# Patient Record
Sex: FEMALE | Race: WHITE | NOT HISPANIC OR LATINO | Employment: FULL TIME | URBAN - METROPOLITAN AREA
[De-identification: names, ages, dates, MRNs, and addresses within clinical notes are randomized per-mention and may not be internally consistent; named-entity substitution may affect disease eponyms.]

---

## 2017-07-21 ENCOUNTER — OFFICE VISIT (OUTPATIENT)
Dept: OBGYN | Facility: CLINIC | Age: 32
End: 2017-07-21
Payer: COMMERCIAL

## 2017-07-21 ENCOUNTER — RESULT FOLLOW UP (OUTPATIENT)
Dept: OBGYN | Facility: CLINIC | Age: 32
End: 2017-07-21

## 2017-07-21 VITALS
BODY MASS INDEX: 25.61 KG/M2 | HEIGHT: 68 IN | SYSTOLIC BLOOD PRESSURE: 102 MMHG | WEIGHT: 169 LBS | DIASTOLIC BLOOD PRESSURE: 68 MMHG

## 2017-07-21 DIAGNOSIS — Z11.8 SCREENING FOR CHLAMYDIAL DISEASE: ICD-10-CM

## 2017-07-21 DIAGNOSIS — R53.83 FATIGUE, UNSPECIFIED TYPE: ICD-10-CM

## 2017-07-21 DIAGNOSIS — Z11.3 SCREEN FOR STD (SEXUALLY TRANSMITTED DISEASE): ICD-10-CM

## 2017-07-21 DIAGNOSIS — Z11.4 SCREENING FOR HUMAN IMMUNODEFICIENCY VIRUS: ICD-10-CM

## 2017-07-21 DIAGNOSIS — Z11.51 SCREENING FOR HUMAN PAPILLOMAVIRUS: ICD-10-CM

## 2017-07-21 DIAGNOSIS — Z01.419 ENCOUNTER FOR GYNECOLOGICAL EXAMINATION WITHOUT ABNORMAL FINDING: Primary | ICD-10-CM

## 2017-07-21 DIAGNOSIS — B97.7 HIGH RISK HPV INFECTION: ICD-10-CM

## 2017-07-21 LAB
HGB BLD-MCNC: 12.8 G/DL (ref 11.7–15.7)
VIT B12 SERPL-MCNC: 332 PG/ML (ref 193–986)

## 2017-07-21 PROCEDURE — 84443 ASSAY THYROID STIM HORMONE: CPT | Performed by: NURSE PRACTITIONER

## 2017-07-21 PROCEDURE — 87491 CHLMYD TRACH DNA AMP PROBE: CPT | Performed by: NURSE PRACTITIONER

## 2017-07-21 PROCEDURE — G0476 HPV COMBO ASSAY CA SCREEN: HCPCS | Performed by: NURSE PRACTITIONER

## 2017-07-21 PROCEDURE — 82607 VITAMIN B-12: CPT | Performed by: NURSE PRACTITIONER

## 2017-07-21 PROCEDURE — G0145 SCR C/V CYTO,THINLAYER,RESCR: HCPCS | Performed by: NURSE PRACTITIONER

## 2017-07-21 PROCEDURE — 82306 VITAMIN D 25 HYDROXY: CPT | Performed by: NURSE PRACTITIONER

## 2017-07-21 PROCEDURE — 86696 HERPES SIMPLEX TYPE 2 TEST: CPT | Performed by: NURSE PRACTITIONER

## 2017-07-21 PROCEDURE — 36415 COLL VENOUS BLD VENIPUNCTURE: CPT | Performed by: NURSE PRACTITIONER

## 2017-07-21 PROCEDURE — 87389 HIV-1 AG W/HIV-1&-2 AB AG IA: CPT | Performed by: NURSE PRACTITIONER

## 2017-07-21 PROCEDURE — 82728 ASSAY OF FERRITIN: CPT | Performed by: NURSE PRACTITIONER

## 2017-07-21 PROCEDURE — 86780 TREPONEMA PALLIDUM: CPT | Performed by: NURSE PRACTITIONER

## 2017-07-21 PROCEDURE — 87591 N.GONORRHOEAE DNA AMP PROB: CPT | Performed by: NURSE PRACTITIONER

## 2017-07-21 PROCEDURE — 99395 PREV VISIT EST AGE 18-39: CPT | Performed by: NURSE PRACTITIONER

## 2017-07-21 PROCEDURE — 86695 HERPES SIMPLEX TYPE 1 TEST: CPT | Performed by: NURSE PRACTITIONER

## 2017-07-21 PROCEDURE — 85018 HEMOGLOBIN: CPT | Performed by: NURSE PRACTITIONER

## 2017-07-21 PROCEDURE — G0499 HEPB SCREEN HIGH RISK INDIV: HCPCS | Performed by: NURSE PRACTITIONER

## 2017-07-21 RX ORDER — LANOLIN ALCOHOL/MO/W.PET/CERES
3 CREAM (GRAM) TOPICAL
COMMUNITY
End: 2018-12-05

## 2017-07-21 RX ORDER — HYDROXYZINE HYDROCHLORIDE 10 MG/1
TABLET, FILM COATED ORAL
Refills: 0 | COMMUNITY
Start: 2017-05-25 | End: 2018-10-26

## 2017-07-21 RX ORDER — UREA 10 %
220 LOTION (ML) TOPICAL
COMMUNITY
End: 2018-12-05

## 2017-07-21 RX ORDER — SERTRALINE HYDROCHLORIDE 25 MG/1
TABLET, FILM COATED ORAL
Refills: 11 | COMMUNITY
Start: 2017-06-23 | End: 2017-08-17

## 2017-07-21 ASSESSMENT — ANXIETY QUESTIONNAIRES
3. WORRYING TOO MUCH ABOUT DIFFERENT THINGS: NOT AT ALL
5. BEING SO RESTLESS THAT IT IS HARD TO SIT STILL: NOT AT ALL
GAD7 TOTAL SCORE: 0
7. FEELING AFRAID AS IF SOMETHING AWFUL MIGHT HAPPEN: NOT AT ALL
6. BECOMING EASILY ANNOYED OR IRRITABLE: NOT AT ALL
IF YOU CHECKED OFF ANY PROBLEMS ON THIS QUESTIONNAIRE, HOW DIFFICULT HAVE THESE PROBLEMS MADE IT FOR YOU TO DO YOUR WORK, TAKE CARE OF THINGS AT HOME, OR GET ALONG WITH OTHER PEOPLE: NOT DIFFICULT AT ALL
1. FEELING NERVOUS, ANXIOUS, OR ON EDGE: NOT AT ALL
2. NOT BEING ABLE TO STOP OR CONTROL WORRYING: NOT AT ALL

## 2017-07-21 ASSESSMENT — PATIENT HEALTH QUESTIONNAIRE - PHQ9: 5. POOR APPETITE OR OVEREATING: NOT AT ALL

## 2017-07-21 NOTE — LETTER
August 3, 2018      Jose L Vale  [address]  [facility] [address]    Dear MsIsmaelVale,      At [facility], your health and wellness is our primary concern. That is why we are following up on a colposcopy and abnormal Pap smear from 08/17/17, which was reported as ASCUS and positive for high risk HPV 18 and other. Your provider had recommended that you have a Pap smear and HPV test completed by 08/17/18. Our records do not show that this has been scheduled.    It is important to complete the follow up that your provider has suggested for you to ensure that there are no worsening changes which may, over time, develop into cancer.      Please contact our office at  [phone] to schedule an appointment for a Pap smear and HPV test at your earliest convenience. If you have questions or concerns, please call the clinic and we will be happy to assist you.    If you have completed the tests outside of [facility], please have the results forwarded to our office. We will update the chart for your primary Physician to review before your next annual physical.     Thank you for choosing [facility]!    Sincerely,      Jamila Villalba, YAMILE CNP/esh

## 2017-07-21 NOTE — MR AVS SNAPSHOT
After Visit Summary   7/21/2017    Jose L Collazo    MRN: [id number]           Patient Information     Date Of Birth          1985        Visit Information        Provider Department      7/21/2017 10:30 AM Jamila Villalba APRN CNP [facility] [place]        Today's Diagnoses     Encounter for gynecological examination without abnormal finding    -  1    Screen for STD (sexually transmitted disease)        Screening for chlamydial disease        Screening for human immunodeficiency virus        Fatigue, unspecified type           Follow-ups after your visit        Your next 10 appointments already scheduled     Aug 03, 2017  2:30 PM CDT   Office Visit with Matt Ray MD   [facility] [place] ([facility])    [address] [place] [id number] [phone]           Bring a current list of meds and any records pertaining to this visit.  For Physicals, please bring immunization records and any forms needing to be filled out.  Please arrive 10 minutes early to complete paperwork.            Aug 09, 2017  8:30 AM CDT   Office Visit with Matt Ray MD   [facility] ([facility])    [address] [place] [id number] [phone]           Bring a current list of meds and any records pertaining to this visit.  For Physicals, please bring immunization records and any forms needing to be filled out.  Please arrive 10 minutes early to complete paperwork.              Who to contact     If you have questions or need follow up information about today's clinic visit or your schedule please contact [facility] [place] directly at [phone].  Normal or non-critical lab and imaging results will be communicated to you by MyChart, letter or phone within 4 business days after the clinic has received the results. If you do not hear from us within 7  "days, please contact the clinic through [employer] or phone. If you have a critical or abnormal lab result, we will notify you by phone as soon as possible.  Submit refill requests through [employer] or call your pharmacy and they will forward the refill request to us. Please allow 3 business days for your refill to be completed.          Additional Information About Your Visit        [employer] Information     [employer] lets you send messages to your doctor, view your test results, renew your prescriptions, schedule appointments and more. To sign up, go to [email or web address] . Click on \"Log in\" on the left side of the screen, which will take you to the Welcome page. Then click on \"Sign up Now\" on the right side of the page.     You will be asked to enter the access code listed below, as well as some personal information. Please follow the directions to create your username and password.     Your access code is: [email or web address]  Expires: 10/19/2017 11:21 AM     Your access code will  in 90 days. If you need help or a new code, please call your [place] clinic or [phone].        Care EveryWhere ID     This is your Care EveryWhere ID. This could be used by other organizations to access your [facility] medical records  [id number]        Your Vitals Were     Height BMI (Body Mass Index)                5' 8\" (1.727 m) 25.7 kg/m2           Blood Pressure from Last 3 Encounters:   17 102/68   16 110/80   06/03/15 124/72    Weight from Last 3 Encounters:   17 169 lb (76.7 kg)   16 180 lb (81.6 kg)   06/03/15 204 lb (92.5 kg)              We Performed the Following     Anti Treponema     Chlamydia trachomatis PCR     Ferritin     Hemoglobin     Hepatitis B surface antigen     Herpes Simplex Virus 1 and 2 IgG     HIV Antigen Antibody Combo     Neisseria gonorrhoeae PCR     Pap imaged thin layer screen reflex to HPV if ASCUS - recommended age 25 - 29 years     TSH with free T4 reflex     Vitamin B12  "    Vitamin D Deficiency        Primary Care Provider    None Specified       No primary provider on file.        Equal Access to Services     KIM MCDONALD : Hadii aad ku hadderrickkim Walsh, paty stephens, bib quintanillamaaddison reed. So [facility] [phone].    ATENCIÓN: Si habla español, tiene a de los santos disposición servicios gratuitos de asistencia lingüística. Llame al [phone].    We comply with applicable federal civil rights laws and [place] laws. We do not discriminate on the basis of race, color, national origin, age, disability sex, sexual orientation or gender identity.            Thank you!     Thank you for choosing [facility]  for your care. Our goal is always to provide you with excellent care. Hearing back from our patients is one way we can continue to improve our services. Please take a few minutes to complete the written survey that you may receive in the mail after your visit with us. Thank you!             Your Updated Medication List - Protect others around you: Learn how to safely use, store and throw away your medicines at www.disposemymeds.org.          This list is accurate as of: 7/21/17 11:26 AM.  Always use your most recent med list.                   Brand Name Dispense Instructions for use Diagnosis    hydrOXYzine 10 MG tablet    ATARAX          levonorgestrel 20 MCG/24HR IUD    MIRENA     1 each by Intrauterine route once        melatonin 3 MG tablet      Take 3 mg by mouth        Multi-vitamin Tabs tablet   Generic drug:  multivitamin, therapeutic with minerals      1 TABLET DAILY prn        RITALIN PO           * sertraline 50 MG tablet    ZOLOFT     TK 1 T PO QAM WITH 25MG FOR A TOTAL OF 75MG D        * sertraline 25 MG tablet    ZOLOFT     TK 1 T PO QAM WITH 50MG T FOR A TOTAL OF 75MG D        VITAMIN D3 PO      Take by mouth daily        Zinc Sulfate 220 (50 ZN) MG Tabs      Take 220 mg by mouth        * Notice:  This list  has 2 medication(s) that are the same as other medications prescribed for you. Read the directions carefully, and ask your doctor or other care provider to review them with you.

## 2017-07-21 NOTE — PROGRESS NOTES
Jose L is a 32 year old  female who presents for annual exam.     Besides routine health maintenance, had breast reduction 2 months ago.  Would like hemoglobin checked, has had some low energy since surgery.    HPI:  Pt here today for her annual exam. She is feeling well. She had a breast reduction 2 months ago. She went from a size E down to B/C. She is pain free. Still numb in spots. Incisions are healing well. She is fatigued however, she is not sure if this is because of recently graduating and studying for her math exams, surgery, stress or all of the above.     She has Mirena IUD in place. No menses. Is due for exchange in 2017    The patient does not have a primary care provider.    GYNECOLOGIC HISTORY:    No LMP recorded. Patient is not currently having periods (Reason: IUD).  Her current contraception method is: IUD.  She  reports that she has never smoked. She has never used smokeless tobacco.  Patient is sexually active.  STD testing offered?  Accepted, full screen    Last PHQ-9 score on record =   PHQ-9 SCORE 2017   Total Score 0     Last GAD7 score on record =   ANA-7 SCORE 2017   Total Score 0     Alcohol Score = 0    HEALTH MAINTENANCE:  Cholesterol: patient unsure when last checked  Last Mammo: N/A, Result: not applicable, Next Mammo: due age 40  Pap:   Lab Results   Component Value Date    PAP NIL 2016    PAP NIL 2015    PAP NIL 2008   Colonoscopy:  N/A, Result: not applicable, Next Colonoscopy: due age 51  Dexa:  Never  Health maintenance updated:  yes    HISTORY:  Obstetric History       T0      L0     SAB0   TAB0   Ectopic0   Multiple0   Live Births0           There is no problem list on file for this patient.    Past Surgical History:   Procedure Laterality Date     HC TOOTH EXTRACTION W/FORCEP       MAMMOPLASTY REDUCTION  2017      Social History   Substance Use Topics     Smoking status: Never Smoker     Smokeless tobacco:  "Never Used     Alcohol use 0.0 oz/week     0 Standard drinks or equivalent per week      Comment: 1-2 glasses of wine per week      Problem (# of Occurrences) Relation (Name,Age of Onset)    CANCER (1) Maternal Grandfather: multiple melanoma ? r/t exposure to Agent Orange    Lung Cancer (1) Maternal Grandmother: non smoker       Negative family history of: Asthma, C.A.D., DIABETES, Hypertension            Current Outpatient Prescriptions   Medication Sig     Cholecalciferol (VITAMIN D3 PO) Take by mouth daily     levonorgestrel (MIRENA) 20 MCG/24HR IUD 1 each by Intrauterine route once     MULTI-VITAMIN OR TABS 1 TABLET DAILY prn     sertraline (ZOLOFT) 50 MG tablet TK 1 T PO QAM WITH 25MG FOR A TOTAL OF 75MG D     sertraline (ZOLOFT) 25 MG tablet TK 1 T PO QAM WITH 50MG T FOR A TOTAL OF 75MG D     hydrOXYzine (ATARAX) 10 MG tablet      Zinc Sulfate 220 (50 ZN) MG TABS Take 220 mg by mouth     melatonin 3 MG tablet Take 3 mg by mouth     Methylphenidate HCl (RITALIN PO)      No current facility-administered medications for this visit.      Allergies   Allergen Reactions     Diphenhydramine Visual Disturbance     Hallucinations, decreased motor control     Seasonal Allergies        Past medical, surgical, social and family histories were reviewed and updated in EPIC.    ROS:   12 point review of systems negative other than symptoms noted below.  Constitutional: Fatigue, Fever, Loss of Appetite and Weight Loss  Head: Earache and Nasal Congestion  Breast: Lumps and Tenderness  Cardiovascular: Lightheadedness  Gastrointestinal: Bloating and Diarrhea  Genitourinary: Hot Flashes  Skin: Acne, Rash and Skin Dryness  Musculoskeletal: Joint Pain    EXAM:  /68  Ht 5' 8\" (1.727 m)  Wt 169 lb (76.7 kg)  BMI 25.7 kg/m2   BMI: Body mass index is 25.7 kg/(m^2).    PHYSICAL EXAM:  Constitutional:  Appearance: Well nourished, well developed, alert, in no acute distress  Neck:  Lymph Nodes:  No lymphadenopathy " present    Thyroid:  Gland size normal, nontender, no nodules or masses present  on palpation  Chest:  Respiratory Effort:  Breathing unlabored  Cardiovascular:    Heart: Auscultation:  Regular rate, normal rhythm, no murmurs present  Breasts: Inspection of Breasts:  No lymphadenopathy present    Palpation of Breasts and Axillae:  No masses present on palpation, no  breast tenderness    Axillary Lymph Nodes:  No lymphadenopathy present  Gastrointestinal:   Abdominal Examination:  Abdomen nontender to palpation, tone normal without rigidity or guarding, no masses present, umbilicus without lesions   Liver and Spleen:  No hepatomegaly present, liver nontender to palpation    Hernias:  No hernias present  Lymphatic: Lymph Nodes:  No other lymphadenopathy present  Skin:  General Inspection:  No rashes present, no lesions present, no areas of  discoloration    Genitalia and Groin:  No rashes present, no lesions present, no areas of  discoloration, no masses present  Neurologic/Psychiatric:    Mental Status:  Oriented X3     Pelvic Exam:  External Genitalia:     Normal appearance for age, no discharge present, no tenderness present, no inflammatory lesions present, color normal  Vagina:    Normal vaginal vault without central or paravaginal defects, no discharge present, no inflammatory lesions present, no masses present  Bladder:     Nontender to palpation  Urethra:   Urethral Body:  Urethra palpation normal, urethra structural support normal   Urethral Meatus:  No erythema or lesions present  Cervix:     Appearance healthy, no lesions present, nontender to palpation, no bleeding present, string present  Uterus:     Nontender to palpation, no masses present, position anteflexed, mobility: normal  Adnexa:     No adnexal tenderness present, no adnexal masses present  Perineum:     Perineum within normal limits, no evidence of trauma, no rashes or skin lesions present  Anus:     Anus within normal limits, no hemorrhoids  present  Inguinal Lymph Nodes:     No lymphadenopathy present  Pubic Hair:     Normal pubic hair distribution for age  Genitalia and Groin:     No rashes present, no lesions present, no areas of discoloration, no masses present      COUNSELING:   Special attention given to:        Regular exercise       Healthy diet/nutrition       Contraception    BMI: Body mass index is 25.7 kg/(m^2).        ASSESSMENT:  32 year old female with satisfactory annual exam.    ICD-10-CM    1. Encounter for gynecological examination without abnormal finding Z01.419 Pap imaged thin layer screen reflex to HPV if ASCUS - recommended age 25 - 29 years   2. Screen for STD (sexually transmitted disease) Z11.3 Anti Treponema     Hepatitis B surface antigen     Herpes Simplex Virus 1 and 2 IgG     HIV Antigen Antibody Combo     Neisseria gonorrhoeae PCR   3. Screening for chlamydial disease Z11.8 Chlamydia trachomatis PCR   4. Screening for human immunodeficiency virus Z11.4    5. Fatigue, unspecified type R53.83 Hemoglobin     Vitamin D Deficiency     Vitamin B12     TSH with free T4 reflex     Ferritin       PLAN:  Annual pap done today. Continue with annual screening. STD testing today, labs for fatigue. Will update when everything is back. Encouraged her to use [employer] for scars in 2 weeks. RTC in November for Mirena IUD exchange and fasting labs.     YAMILE Kaur CNP

## 2017-07-22 LAB
FERRITIN SERPL-MCNC: 88 NG/ML (ref 12–150)
T PALLIDUM IGG+IGM SER QL: NEGATIVE
TSH SERPL DL<=0.005 MIU/L-ACNC: 2.03 MU/L (ref 0.4–4)

## 2017-07-22 ASSESSMENT — ANXIETY QUESTIONNAIRES: GAD7 TOTAL SCORE: 0

## 2017-07-22 ASSESSMENT — PATIENT HEALTH QUESTIONNAIRE - PHQ9: SUM OF ALL RESPONSES TO PHQ QUESTIONS 1-9: 0

## 2017-07-23 LAB
C TRACH DNA SPEC QL NAA+PROBE: NORMAL
N GONORRHOEA DNA SPEC QL NAA+PROBE: NORMAL
SPECIMEN SOURCE: NORMAL
SPECIMEN SOURCE: NORMAL

## 2017-07-24 LAB
DEPRECATED CALCIDIOL+CALCIFEROL SERPL-MC: 64 UG/L (ref 20–75)
HBV SURFACE AG SERPL QL IA: NONREACTIVE
HIV 1+2 AB+HIV1 P24 AG SERPL QL IA: NORMAL
HSV1 IGG SERPL QL IA: NORMAL AI (ref 0–0.8)
HSV2 IGG SERPL QL IA: 0.5 AI (ref 0–0.8)

## 2017-07-25 LAB
COPATH REPORT: NORMAL
PAP: NORMAL

## 2017-08-07 LAB
FINAL DIAGNOSIS: ABNORMAL
HPV HR 12 DNA CVX QL NAA+PROBE: POSITIVE
HPV16 DNA SPEC QL NAA+PROBE: NEGATIVE
HPV18 DNA SPEC QL NAA+PROBE: POSITIVE
SPECIMEN DESCRIPTION: ABNORMAL

## 2017-08-08 NOTE — PROGRESS NOTES
6/3/15 NIL/+ HR HPV 18. Plan: repeat pap in 1 year per provider  8/9/16 NIL pap  7/21/17 NIL/+ HR HPV 18 and other (NOT16).  Plan: colposcopy within 3 months.  Due by 10/21/17  8/8/17 Pt. Advised.  8/17/17 [place] [facility]: Atypical cells present.  ASCUS pap, + HR HPV 18 and other. Plan: cotest in 1 year. ([facility])  08/03/18 Pap reminder letter sent. ([facility])  08/24/18 LMTC and schedule at [facility]. ([facility])  09/07/18 Patient is lost to pap tracking follow-up. FYI routed to provider. ([facility])

## 2017-08-09 ENCOUNTER — OFFICE VISIT (OUTPATIENT)
Dept: OBGYN | Facility: CLINIC | Age: 32
End: 2017-08-09
Payer: COMMERCIAL

## 2017-08-09 VITALS
BODY MASS INDEX: 25.61 KG/M2 | DIASTOLIC BLOOD PRESSURE: 62 MMHG | WEIGHT: 169 LBS | HEIGHT: 68 IN | SYSTOLIC BLOOD PRESSURE: 112 MMHG

## 2017-08-09 DIAGNOSIS — Z30.433 ENCOUNTER FOR IUD REMOVAL AND REINSERTION: Primary | ICD-10-CM

## 2017-08-09 DIAGNOSIS — Z13.6 ENCOUNTER FOR LIPID SCREENING FOR CARDIOVASCULAR DISEASE: ICD-10-CM

## 2017-08-09 DIAGNOSIS — Z13.220 ENCOUNTER FOR LIPID SCREENING FOR CARDIOVASCULAR DISEASE: ICD-10-CM

## 2017-08-09 DIAGNOSIS — Z13.1 SCREENING FOR DIABETES MELLITUS: ICD-10-CM

## 2017-08-09 LAB
CHOLEST SERPL-MCNC: 165 MG/DL
GLUCOSE BLD-MCNC: 93 MG/DL (ref 70–99)
HDLC SERPL-MCNC: 68 MG/DL
LDLC SERPL CALC-MCNC: 92 MG/DL
NONHDLC SERPL-MCNC: 97 MG/DL
TRIGL SERPL-MCNC: 26 MG/DL

## 2017-08-09 PROCEDURE — 58301 REMOVE INTRAUTERINE DEVICE: CPT | Performed by: OBSTETRICS & GYNECOLOGY

## 2017-08-09 PROCEDURE — 58300 INSERT INTRAUTERINE DEVICE: CPT | Performed by: OBSTETRICS & GYNECOLOGY

## 2017-08-09 PROCEDURE — 80061 LIPID PANEL: CPT | Performed by: OBSTETRICS & GYNECOLOGY

## 2017-08-09 PROCEDURE — 36415 COLL VENOUS BLD VENIPUNCTURE: CPT | Performed by: OBSTETRICS & GYNECOLOGY

## 2017-08-09 PROCEDURE — 82947 ASSAY GLUCOSE BLOOD QUANT: CPT | Performed by: OBSTETRICS & GYNECOLOGY

## 2017-08-09 NOTE — LETTER
8/10/2017     Jose L Collazo  [address]  [facility] [address]        Jose L Collazo your lab results came back normal.       Results for orders placed or performed in visit on 08/09/17   Lipid panel reflex to direct LDL   Result Value Ref Range    Cholesterol 165 <200 mg/dL    Triglycerides 26 <150 mg/dL    HDL Cholesterol 68 >49 mg/dL    LDL Cholesterol Calculated 92 <100 mg/dL    Non HDL Cholesterol 97 <130 mg/dL   Glucose, whole blood   Result Value Ref Range    Glucose Whole Blood 93 70 - 99 mg/dL       Your labs were all normal. Have a great year!      Cordially,    YAMILE Kaur CNP

## 2017-08-09 NOTE — MR AVS SNAPSHOT
"              After Visit Summary   2017    Jose L Collazo    MRN: [id number]           Patient Information     Date Of Birth          1985        Visit Information        Provider Department      2017 8:30 AM Matt Ray MD [facility]        Today's Diagnoses     Encounter for IUD removal and reinsertion    -  1    Encounter for lipid screening for cardiovascular disease        Screening for diabetes mellitus           Follow-ups after your visit        Who to contact     If you have questions or need follow up information about today's clinic visit or your schedule please contact [facility] directly at [phone].  Normal or non-critical lab and imaging results will be communicated to you by [employer], letter or phone within 4 business days after the clinic has received the results. If you do not hear from us within 7 days, please contact the clinic through [employer] or phone. If you have a critical or abnormal lab result, we will notify you by phone as soon as possible.  Submit refill requests through [employer] or call your pharmacy and they will forward the refill request to us. Please allow 3 business days for your refill to be completed.          Additional Information About Your Visit        MyChart Information     [employer] lets you send messages to your doctor, view your test results, renew your prescriptions, schedule appointments and more. To sign up, go to [email or web address] . Click on \"Log in\" on the left side of the screen, which will take you to the Welcome page. Then click on \"Sign up Now\" on the right side of the page.     You will be asked to enter the access code listed below, as well as some personal information. Please follow the directions to create your username and password.     Your access code is: [email or web address]  Expires: 10/19/2017 11:21 AM     Your access code will  in 90 days. If you need help or a new code, please call " "your [place] clinic or [phone].        Care EveryWhere ID     This is your Care EveryWhere ID. This could be used by other organizations to access your [place] medical records  [id number]        Your Vitals Were     Height Breastfeeding? BMI (Body Mass Index)             1.727 m (5' 8\") No 25.7 kg/m2          Blood Pressure from Last 3 Encounters:   08/09/17 112/62   07/21/17 102/68   08/19/16 110/80    Weight from Last 3 Encounters:   08/09/17 76.7 kg (169 lb)   07/21/17 76.7 kg (169 lb)   08/19/16 81.6 kg (180 lb)              We Performed the Following     Glucose, whole blood     HC LEVONORGESTREL IU 52MG 5 YR     IUD INSERTION     IUD REMOVAL     Lipid panel reflex to direct LDL          Today's Medication Changes          These changes are accurate as of: 8/9/17  9:08 AM.  If you have any questions, ask your nurse or doctor.               These medicines have changed or have updated prescriptions.        Dose/Directions    * levonorgestrel 20 MCG/24HR IUD   Commonly known as:  MIRENA   This may have changed:  Another medication with the same name was added. Make sure you understand how and when to take each.   Changed by:  Jamila Villalba APRN CNP        Dose:  1 each   1 each by Intrauterine route once   Refills:  0       * levonorgestrel 20 MCG/24HR IUD   Commonly known as:  MIRENA (52 MG)   This may have changed:  You were already taking a medication with the same name, and this prescription was added. Make sure you understand how and when to take each.   Used for:  Encounter for IUD removal and reinsertion   Changed by:  Matt Ray MD        Dose:  1 each   1 each (20 mcg) by Intrauterine route once for 1 dose   Quantity:  1 each   Refills:  0       * Notice:  This list has 2 medication(s) that are the same as other medications prescribed for you. Read the directions carefully, and ask your doctor or other care provider to review them with you.         Where to get your medicines    "   Some of these will need a paper prescription and others can be bought over the counter.  Ask your nurse if you have questions.     You don't need a prescription for these medications     levonorgestrel 20 MCG/24HR IUD                Primary Care Provider    None Specified       No primary provider on file.        Equal Access to Services     KIM MCDONALD : Hadii aad ku hadderrickkim Timali, wacaitlynda luqadaha, qaybta kaalmada adenicoleyad, addison king tatianabethany villedasandiodilia sims. So [facility] [phone].    ATENCIÓN: Si habla español, tiene a de los santos disposición servicios gratuitos de asistencia lingüística. Llame al [phone].    We comply with applicable federal civil rights laws and [place] laws. We do not discriminate on the basis of race, color, national origin, age, disability sex, sexual orientation or gender identity.            Thank you!     Thank you for choosing [facility]  for your care. Our goal is always to provide you with excellent care. Hearing back from our patients is one way we can continue to improve our services. Please take a few minutes to complete the written survey that you may receive in the mail after your visit with us. Thank you!             Your Updated Medication List - Protect others around you: Learn how to safely use, store and throw away your medicines at www.disposemymeds.org.          This list is accurate as of: 8/9/17  9:08 AM.  Always use your most recent med list.                   Brand Name Dispense Instructions for use Diagnosis    hydrOXYzine 10 MG tablet    ATARAX          * levonorgestrel 20 MCG/24HR IUD    MIRENA     1 each by Intrauterine route once        * levonorgestrel 20 MCG/24HR IUD    MIRENA (52 MG)    1 each    1 each (20 mcg) by Intrauterine route once for 1 dose    Encounter for IUD removal and reinsertion       melatonin 3 MG tablet      Take 3 mg by mouth        Multi-vitamin Tabs tablet   Generic drug:  multivitamin, therapeutic with  minerals      1 TABLET DAILY prn        RITALIN PO           * sertraline 50 MG tablet    ZOLOFT     TK 1 T PO QAM WITH 25MG FOR A TOTAL OF 75MG D        * sertraline 25 MG tablet    ZOLOFT     TK 1 T PO QAM WITH 50MG T FOR A TOTAL OF 75MG D        VITAMIN D3 PO      Take by mouth daily        Zinc Sulfate 220 (50 ZN) MG Tabs      Take 220 mg by mouth        * Notice:  This list has 4 medication(s) that are the same as other medications prescribed for you. Read the directions carefully, and ask your doctor or other care provider to review them with you.

## 2017-08-09 NOTE — PROGRESS NOTES
INDICATIONS:                                                      Is a pregnancy test required: No.  Was a consent obtained?  Yes    Jose L Collazo is a 32 year old female,, No LMP recorded. Patient is not currently having periods (Reason: IUD). who presents today for IUD removal. Her current IUD was placed 5 years ago. She has not had problems with the IUD. She requests removal of the IUD because the IUD effectiveness has     Today's PHQ-2 Score:   PHQ-2 (  [employer]) 6/3/2015   Q1: Little interest or pleasure in doing things 0   Q2: Feeling down, depressed or hopeless 0   PHQ-2 Score 0       PROCEDURE:                                                      A speculum exam was performed and the cervix was visualized. The IUD string was visualized. Using ring forceps, the string  was grasped and the IUD removed intact.    POST PROCEDURE:                                                      The patient tolerated the procedure well. Patient was discharged in stable condition.    Birth control counseling given.    Matt Ray MD and  INDICATIONS:                                                      Is a pregnancy test required: No.  Was a consent obtained?  Yes    Jose L Collazo is a 32 year old female,   who presents for insertion of an IUD. The risks, benefits and alternatives of IUD insertion were discussed in detail previously. She also has reviewed the product brochure.  She has elected to go ahead with the insertion  today and her questions were answered. Consent was signed. Her LMP has been absent due to current Mirena. A pregnancy test was performed today:  No    Today's PHQ-2 Score:   PHQ-2 (  [employer]) 6/3/2015   Q1: Little interest or pleasure in doing things 0   Q2: Feeling down, depressed or hopeless 0   PHQ-2 Score 0       PROCEDURE:                                                      The pelvic exam revealed normal external genitalia. On bimanual exam the uterus was  Midposition and normal in size with no tenderness present. A speculum was inserted into the vagina and the cervix was visualized. The cervix was prepped with Betadine . The anterior lip of the cervix was grasped with a single toothed tenaculum. The uterus sounded to 6 cm. A Mirena IUD was then inserted without difficulty. The string was cut to 3 cm.  The patient experienced a mild amount of cramping.    POST PROCEDURE:                                                      She  tolerated the procedure well. There were no complications. Patient was discharged in stable condition.    Return to clinic in 5 weeks for IUD check.  Call if severe cramping, fever, abnormal bleeding, abnormal discharge or pelvic pain develop.  Patient was counseled about the chance of irregular bleeding.    Matt Ray MD

## 2017-08-17 ENCOUNTER — OFFICE VISIT (OUTPATIENT)
Dept: OBGYN | Facility: CLINIC | Age: 32
End: 2017-08-17
Payer: COMMERCIAL

## 2017-08-17 VITALS
SYSTOLIC BLOOD PRESSURE: 96 MMHG | HEART RATE: 72 BPM | BODY MASS INDEX: 25.61 KG/M2 | DIASTOLIC BLOOD PRESSURE: 64 MMHG | HEIGHT: 68 IN | WEIGHT: 169 LBS

## 2017-08-17 DIAGNOSIS — B97.7 HIGH RISK HPV INFECTION: Primary | ICD-10-CM

## 2017-08-17 PROCEDURE — 88141 CYTOPATH C/V INTERPRET: CPT | Performed by: OBSTETRICS & GYNECOLOGY

## 2017-08-17 PROCEDURE — 88175 CYTOPATH C/V AUTO FLUID REDO: CPT | Performed by: OBSTETRICS & GYNECOLOGY

## 2017-08-17 PROCEDURE — 88305 TISSUE EXAM BY PATHOLOGIST: CPT | Performed by: OBSTETRICS & GYNECOLOGY

## 2017-08-17 PROCEDURE — 57456 ENDOCERV CURETTAGE W/SCOPE: CPT | Performed by: OBSTETRICS & GYNECOLOGY

## 2017-08-17 PROCEDURE — 88305 TISSUE EXAM BY PATHOLOGIST: CPT | Mod: 26 | Performed by: OBSTETRICS & GYNECOLOGY

## 2017-08-17 PROCEDURE — G0476 HPV COMBO ASSAY CA SCREEN: HCPCS | Performed by: OBSTETRICS & GYNECOLOGY

## 2017-08-17 PROCEDURE — 87624 HPV HI-RISK TYP POOLED RSLT: CPT | Performed by: OBSTETRICS & GYNECOLOGY

## 2017-08-17 NOTE — PROGRESS NOTES
INDICATIONS:                                                    Is a pregnancy test required: No.  Was a consent obtained?  Yes    Today's PHQ-2 Score:   PHQ-2 ( 1999 [employer]) 8/17/2017   Q1: Little interest or pleasure in doing things 0   Q2: Feeling down, depressed or hopeless 0   PHQ-2 Score 0     Today's PHQ-9 Score:    PHQ-9 SCORE 7/21/2017   Total Score 0     Jose L Collazo, is a 32 year old female, who had a recent NIL pap.  HPV positive.  Yes prior history of abnormal pap. Here today for colposcopy. Discussed indication, risks of infection and bleeding.    Her last pap was   Lab Results   Component Value Date    PAP NIL 07/21/2017    .    PROCEDURE:                                                      Cervix is stained with acetic acid and viewed colposcopically. Squamocolumnar junction is visualized in it's entirity. no visible lesions . Biopsy done No. Endocervical curretage Done         POST PROCEDURE:                                                      IMPRESSION: Normal cervix    PLAN : Await the results of the biopsies.  Repeat pap in 12 months.  She  tolerated the procedure well. There were no complications. Patient was discharged in stable condition.    Patient advised to call the clinic if excessive bleeding, pelvic pain, or fever.     Follow-up plan based on pathology results.    Matt Ray MD

## 2017-08-17 NOTE — LETTER
September 12, 2017      Jose L Collazo  [address]  [facility] [address]    Dear ,      This letter is in regards to your recent cervical cancer screening (Pap smear and HPV test).    Your Pap smear result was reported as ASCUS or Atypical Squamous Cells of Undetermined Significance This means that there were mildly abnormal cells found in the sample that we collected from your cervix, but no cancer cells were found. The vast majority of patients with this result do not have significant cervical abnormalities.     Your cervical sample was also tested for the presence of Human Papillomavirus (HPV). Your HPV test is POSITIVE for HPV.     It is recommended that you repeat your pap and HPV in 1 year.    Please continue to be seen every year for an annual physical exam and other preventative tests.    Sincerely,      Matt Ray MD /lizzie

## 2017-08-17 NOTE — MR AVS SNAPSHOT
"              After Visit Summary   2017    Jose L Collazo    MRN: [id number]           Patient Information     Date Of Birth          1985        Visit Information        Provider Department      2017 2:30 PM Matt Ray MD; [facility]        Today's Diagnoses     High risk HPV infection    -  1       Follow-ups after your visit        Who to contact     If you have questions or need follow up information about today's clinic visit or your schedule please contact [facility] directly at [phone].  Normal or non-critical lab and imaging results will be communicated to you by WP Fail-Safehart, letter or phone within 4 business days after the clinic has received the results. If you do not hear from us within 7 days, please contact the clinic through WP Fail-Safehart or phone. If you have a critical or abnormal lab result, we will notify you by phone as soon as possible.  Submit refill requests through iOculi or call your pharmacy and they will forward the refill request to us. Please allow 3 business days for your refill to be completed.          Additional Information About Your Visit        MyChart Information     iOculi lets you send messages to your doctor, view your test results, renew your prescriptions, schedule appointments and more. To sign up, go to [email or web address] . Click on \"Log in\" on the left side of the screen, which will take you to the Welcome page. Then click on \"Sign up Now\" on the right side of the page.     You will be asked to enter the access code listed below, as well as some personal information. Please follow the directions to create your username and password.     Your access code is: [email or web address]  Expires: 10/19/2017 11:21 AM     Your access code will  in 90 days. If you need help or a new code, please call your [place] clinic or [phone].        Care EveryWhere ID     This is your Care EveryWhere ID. This " "could be used by other organizations to access your [place] medical records  [id number]        Your Vitals Were     Pulse Height BMI (Body Mass Index)             72 1.727 m (5' 8\") 25.7 kg/m2          Blood Pressure from Last 3 Encounters:   08/17/17 96/64   08/09/17 112/62   07/21/17 102/68    Weight from Last 3 Encounters:   08/17/17 76.7 kg (169 lb)   08/09/17 76.7 kg (169 lb)   07/21/17 76.7 kg (169 lb)              We Performed the Following     A pap thin layer diagnostic with HPV (select HPV order below)     COLPOSCOPY CERVIX/UPPER VAGINA W BX CERVIX     HPV High Risk Types DNA Cervical     Surgical pathology exam        Primary Care Provider    None Specified       No primary provider on file.        Equal Access to Services     KIM MCDONALD : Francesco Walsh, paty stephens, bib kaalmaeyad soriano, addison abbasi . So [facility] [phone].    ATENCIÓN: Si habla español, tiene a de los santos disposición servicios gratuitos de asistencia lingüística. Llame al [phone].    We comply with applicable federal civil rights laws and [place] laws. We do not discriminate on the basis of race, color, national origin, age, disability sex, sexual orientation or gender identity.            Thank you!     Thank you for choosing [facility]  for your care. Our goal is always to provide you with excellent care. Hearing back from our patients is one way we can continue to improve our services. Please take a few minutes to complete the written survey that you may receive in the mail after your visit with us. Thank you!             Your Updated Medication List - Protect others around you: Learn how to safely use, store and throw away your medicines at www.disposemymeds.org.          This list is accurate as of: 8/17/17  3:14 PM.  Always use your most recent med list.                   Brand Name Dispense Instructions for use Diagnosis    hydrOXYzine 10 MG tablet    " ATARAX          levonorgestrel 20 MCG/24HR IUD    MIRENA     1 each by Intrauterine route once        LEXAPRO PO      Take 10 mg by mouth daily        melatonin 3 MG tablet      Take 3 mg by mouth        Multi-vitamin Tabs tablet   Generic drug:  multivitamin, therapeutic with minerals      1 TABLET DAILY prn        RITALIN PO           VITAMIN D3 PO      Take by mouth daily        Zinc Sulfate 220 (50 ZN) MG Tabs      Take 220 mg by mouth

## 2017-08-21 LAB — COPATH REPORT: NORMAL

## 2017-08-22 LAB
COPATH REPORT: ABNORMAL
PAP: ABNORMAL

## 2018-03-27 ENCOUNTER — OFFICE VISIT (OUTPATIENT)
Dept: OBGYN | Facility: CLINIC | Age: 33
End: 2018-03-27
Payer: COMMERCIAL

## 2018-03-27 VITALS
SYSTOLIC BLOOD PRESSURE: 114 MMHG | HEIGHT: 68 IN | DIASTOLIC BLOOD PRESSURE: 70 MMHG | BODY MASS INDEX: 29.25 KG/M2 | WEIGHT: 193 LBS | HEART RATE: 72 BPM

## 2018-03-27 DIAGNOSIS — N89.8 ITCHING OF VAGINA: ICD-10-CM

## 2018-03-27 DIAGNOSIS — Z11.3 SCREEN FOR SEXUALLY TRANSMITTED DISEASES: ICD-10-CM

## 2018-03-27 DIAGNOSIS — B37.31 VAGINAL CANDIDA: Primary | ICD-10-CM

## 2018-03-27 DIAGNOSIS — Z11.8 SPECIAL SCREENING EXAMINATION FOR CHLAMYDIAL DISEASE: ICD-10-CM

## 2018-03-27 LAB
GRAM STN SPEC: ABNORMAL
GRAM STN SPEC: ABNORMAL
SPECIMEN SOURCE: ABNORMAL

## 2018-03-27 PROCEDURE — 87205 SMEAR GRAM STAIN: CPT | Performed by: NURSE PRACTITIONER

## 2018-03-27 PROCEDURE — 87106 FUNGI IDENTIFICATION YEAST: CPT | Performed by: NURSE PRACTITIONER

## 2018-03-27 PROCEDURE — 87491 CHLMYD TRACH DNA AMP PROBE: CPT | Performed by: NURSE PRACTITIONER

## 2018-03-27 PROCEDURE — 87591 N.GONORRHOEAE DNA AMP PROB: CPT | Performed by: NURSE PRACTITIONER

## 2018-03-27 PROCEDURE — 87102 FUNGUS ISOLATION CULTURE: CPT | Performed by: NURSE PRACTITIONER

## 2018-03-27 PROCEDURE — 99213 OFFICE O/P EST LOW 20 MIN: CPT | Performed by: NURSE PRACTITIONER

## 2018-03-27 RX ORDER — FLUCONAZOLE 150 MG/1
150 TABLET ORAL
Qty: 2 TABLET | Refills: 0 | Status: SHIPPED | OUTPATIENT
Start: 2018-03-27 | End: 2018-10-26

## 2018-03-27 NOTE — MR AVS SNAPSHOT
"              After Visit Summary   3/27/2018    Jose L Collazo    MRN: [id number]           Patient Information     Date Of Birth          1985        Visit Information        Provider Department      3/27/2018 9:30 AM Jamila Villalba APRN CNP [facility]        Today's Diagnoses     Vaginal candida    -  1    Itching of vagina        Special screening examination for chlamydial disease        Screen for sexually transmitted diseases           Follow-ups after your visit        Follow-up notes from your care team     Return in about 3 months (around 6/27/2018) for Physical Exam.      Who to contact     If you have questions or need follow up information about today's clinic visit or your schedule please contact [facility] directly at [phone].  Normal or non-critical lab and imaging results will be communicated to you by [employer] Labshart, letter or phone within 4 business days after the clinic has received the results. If you do not hear from us within 7 days, please contact the clinic through MyChart or phone. If you have a critical or abnormal lab result, we will notify you by phone as soon as possible.  Submit refill requests through [employer] or call your pharmacy and they will forward the refill request to us. Please allow 3 business days for your refill to be completed.          Additional Information About Your Visit        MyChart Information     Parental Health lets you send messages to your doctor, view your test results, renew your prescriptions, schedule appointments and more. To sign up, go to [email or web address] . Click on \"Log in\" on the left side of the screen, which will take you to the Welcome page. Then click on \"Sign up Now\" on the right side of the page.     You will be asked to enter the access code listed below, as well as some personal information. Please follow the directions to create your username and password.     Your access code is: " "[email or web address]  Expires: 2018  9:53 AM     Your access code will  in 90 days. If you need help or a new code, please call your [place] clinic or [phone].        Care EveryWhere ID     This is your Care EveryWhere ID. This could be used by other organizations to access your [place] medical records  [id number]        Your Vitals Were     Pulse Height BMI (Body Mass Index)             72 5' 8\" (1.727 m) 29.35 kg/m2          Blood Pressure from Last 3 Encounters:   18 114/70   17 96/64   17 112/62    Weight from Last 3 Encounters:   18 193 lb (87.5 kg)   17 169 lb (76.7 kg)   17 169 lb (76.7 kg)              We Performed the Following     CHLAMYDIA TRACHOMATIS PCR     Gram stain     NEISSERIA GONORRHOEA PCR     Yeast culture          Today's Medication Changes          These changes are accurate as of 3/27/18  9:53 AM.  If you have any questions, ask your nurse or doctor.               Start taking these medicines.        Dose/Directions    fluconazole 150 MG tablet   Commonly known as:  DIFLUCAN   Used for:  Vaginal candida        Dose:  150 mg   Take 1 tablet (150 mg) by mouth every 3 days   Quantity:  2 tablet   Refills:  0            Where to get your medicines      These medications were sent to [facility] [address]  [address] [address]     Phone:  [phone]     fluconazole 150 MG tablet                Primary Care Provider Office Phone # Fax #    Jamila Villalba, APRN [phone] [address] [facility] [address]  [facility] [address]        Equal Access to Services     [facility] TAMARA AH: Hadii marcos Walsh, waluis stephens, qaybta kaalmada christie, addison sims. So [facility] [phone].    ATENCIÓN: Si habla español, tiene a de los santos disposición servicios gratuitos de asistencia lingüística. Llame al [phone].    We comply with applicable " federal civil rights laws and [place] laws. We do not discriminate on the basis of race, color, national origin, age, disability, sex, sexual orientation, or gender identity.            Thank you!     Thank you for choosing [facility]  for your care. Our goal is always to provide you with excellent care. Hearing back from our patients is one way we can continue to improve our services. Please take a few minutes to complete the written survey that you may receive in the mail after your visit with us. Thank you!             Your Updated Medication List - Protect others around you: Learn how to safely use, store and throw away your medicines at www.disposemymeds.org.          This list is accurate as of 3/27/18  9:53 AM.  Always use your most recent med list.                   Brand Name Dispense Instructions for use Diagnosis    fluconazole 150 MG tablet    DIFLUCAN    2 tablet    Take 1 tablet (150 mg) by mouth every 3 days    Vaginal candida       hydrOXYzine 10 MG tablet    ATARAX          levonorgestrel 20 MCG/24HR IUD    MIRENA     1 each by Intrauterine route once        LEXAPRO PO      Take 10 mg by mouth daily        melatonin 3 MG tablet      Take 3 mg by mouth        Multi-vitamin Tabs tablet   Generic drug:  multivitamin, therapeutic with minerals      1 TABLET DAILY prn        RITALIN PO           VITAMIN D3 PO      Take by mouth daily        Zinc Sulfate 220 (50 ZN) MG Tabs      Take 220 mg by mouth

## 2018-03-27 NOTE — PROGRESS NOTES
SUBJECTIVE:                                                   Jose L Collazo is a 33 year old female who presents to clinic today for the following health issue(s):  Patient presents with:  Vaginal Problem: yeast  infection,  Vaginal Itching 4 day           HPI:  Pt here today with c/o vaginal itching x4 days. She can usually rid herself of a yeast infection with jose, and probiotics. She bathes every day.     She started dating again, and was sexually active with 2 men. They used condoms but would like CT/GC testing today.    No LMP recorded. Patient is not currently having periods (Reason: IUD)..   Patient is sexually active, .  Using IUD for contraception.    reports that she has never smoked. She has never used smokeless tobacco.    STD testing offered?  Accepted    Health maintenance updated:  yes    Today's PHQ-2 Score:   PHQ-2 (  [employer]) 2017   Q1: Little interest or pleasure in doing things 0   Q2: Feeling down, depressed or hopeless 0   PHQ-2 Score 0     Today's PHQ-9 Score:   PHQ-9 SCORE 2017   Total Score 0     Today's ANA-7 Score:   ANA-7 SCORE 2017   Total Score 0       Problem list and histories reviewed & adjusted, as indicated.  Additional history: as documented.    Patient Active Problem List   Diagnosis     High risk HPV infection     Past Surgical History:   Procedure Laterality Date     HC TOOTH EXTRACTION W/FORCEP       MAMMOPLASTY REDUCTION  2017      Social History   Substance Use Topics     Smoking status: Never Smoker     Smokeless tobacco: Never Used     Alcohol use 0.0 oz/week     0 Standard drinks or equivalent per week      Comment: 1-2 glasses of wine per week      Problem (# of Occurrences) Relation (Name,Age of Onset)    CANCER (1) Maternal Grandfather: multiple melanoma ? r/t exposure to Agent Orange    Lung Cancer (1) Maternal Grandmother: non smoker       Negative family history of: Asthma, C.A.D., DIABETES, Hypertension         "    Current Outpatient Prescriptions   Medication Sig     fluconazole (DIFLUCAN) 150 MG tablet Take 1 tablet (150 mg) by mouth every 3 days     Escitalopram Oxalate (LEXAPRO PO) Take 10 mg by mouth daily     hydrOXYzine (ATARAX) 10 MG tablet      Zinc Sulfate 220 (50 ZN) MG TABS Take 220 mg by mouth     melatonin 3 MG tablet Take 3 mg by mouth     Cholecalciferol (VITAMIN D3 PO) Take by mouth daily     Methylphenidate HCl (RITALIN PO)      levonorgestrel (MIRENA) 20 MCG/24HR IUD 1 each by Intrauterine route once     MULTI-VITAMIN OR TABS 1 TABLET DAILY prn     No current facility-administered medications for this visit.      Allergies   Allergen Reactions     Diphenhydramine Visual Disturbance     Hallucinations, decreased motor control     Seasonal Allergies        ROS:  12 point review of systems negative other than symptoms noted below.  Genitourinary: Vaginal Discharge and Vaginal Itching    OBJECTIVE:     /70  Pulse 72  Ht 5' 8\" (1.727 m)  Wt 193 lb (87.5 kg)  BMI 29.35 kg/m2  Body mass index is 29.35 kg/(m^2).    Exam:  Constitutional:  Appearance: Well nourished, well developed alert, in no acute distress  Neurologic/Psychiatric:  Mental Status:  Oriented X3   Pelvic Exam:  External Genitalia:     Normal appearance for age, no discharge present, no tenderness present, no inflammatory lesions present, color normal  Vagina:    Normal vaginal vault without central or paravaginal defects, thick white curd like discharge present, no inflammatory lesions present, no masses present  Bladder:     Nontender to palpation  Urethra:   Urethral Body:  Urethra palpation normal, urethra structural support normal   Urethral Meatus:  No erythema or lesions present  Cervix:     Appearance healthy, no lesions present, nontender to palpation, no bleeding present, string present  Uterus:     Nontender to palpation, no masses present, position anteflexed, mobility: normal  Adnexa:     No adnexal tenderness present, no " adnexal masses present  Perineum:     Perineum within normal limits, no evidence of trauma, no rashes or skin lesions present  Anus:     Anus within normal limits, no hemorrhoids present  Inguinal Lymph Nodes:     No lymphadenopathy present  Pubic Hair:     Normal pubic hair distribution for age  Genitalia and Groin:     No rashes present, no lesions present, no areas of discoloration, no masses present       In-Clinic Test Results:  No results found for this or any previous visit (from the past 24 hour(s)).    ASSESSMENT/PLAN:                                                        ICD-10-CM    1. Vaginal candida B37.3 fluconazole (DIFLUCAN) 150 MG tablet   2. Itching of vagina L29.8 Gram stain     Yeast culture   3. Special screening examination for chlamydial disease Z11.8 CHLAMYDIA TRACHOMATIS PCR   4. Screen for sexually transmitted diseases Z11.3 NEISSERIA GONORRHOEA PCR       There are no Patient Instructions on file for this visit.    +Yeast on exam. Will also send gram stain for BV. Will update with CT/GC tomorrow.    YAMILE Kaur [facility] [place]

## 2018-03-28 LAB
C TRACH DNA SPEC QL NAA+PROBE: NEGATIVE
N GONORRHOEA DNA SPEC QL NAA+PROBE: NEGATIVE
SPECIMEN SOURCE: NORMAL
SPECIMEN SOURCE: NORMAL

## 2018-03-30 LAB
SPECIMEN SOURCE: ABNORMAL
YEAST SPEC QL CULT: ABNORMAL

## 2018-08-24 ENCOUNTER — TELEPHONE (OUTPATIENT)
Dept: OBGYN | Facility: CLINIC | Age: 33
End: 2018-08-24

## 2018-08-24 NOTE — TELEPHONE ENCOUNTER
Pt is past due for f/u pap smear.  Reminder letter was sent 08/03/18.  LMTC and schedule at [facility].  Left this writer's number in case of questions ([phone]).  If no reply and/or appt within 2 weeks (09/07/18) pt will be considered lost to pap tracking f/u.  Dasha Ramirez,    Pap Tracking

## 2018-09-22 ENCOUNTER — HEALTH MAINTENANCE LETTER (OUTPATIENT)
Age: 33
End: 2018-09-22

## 2018-09-29 NOTE — PROGRESS NOTES
Opened by: Jamila Villalba APRN CNP        on Fri Sep 7, 2018  7:37 AM         Doned by: Jamila Villalba APRN CNP        on Fri Sep 7, 2018  7:38 AM

## 2018-10-20 ENCOUNTER — HEALTH MAINTENANCE LETTER (OUTPATIENT)
Age: 33
End: 2018-10-20

## 2018-10-26 ENCOUNTER — RESULT FOLLOW UP (OUTPATIENT)
Dept: OBGYN | Facility: CLINIC | Age: 33
End: 2018-10-26

## 2018-10-26 ENCOUNTER — OFFICE VISIT (OUTPATIENT)
Dept: OBGYN | Facility: CLINIC | Age: 33
End: 2018-10-26
Payer: COMMERCIAL

## 2018-10-26 VITALS
SYSTOLIC BLOOD PRESSURE: 120 MMHG | WEIGHT: 190.4 LBS | DIASTOLIC BLOOD PRESSURE: 62 MMHG | BODY MASS INDEX: 28.85 KG/M2 | HEIGHT: 68 IN | HEART RATE: 80 BPM

## 2018-10-26 DIAGNOSIS — Z11.8 SCREENING FOR CHLAMYDIAL DISEASE: ICD-10-CM

## 2018-10-26 DIAGNOSIS — Z01.419 ENCOUNTER FOR GYNECOLOGICAL EXAMINATION WITHOUT ABNORMAL FINDING: Primary | ICD-10-CM

## 2018-10-26 DIAGNOSIS — Z11.3 SCREEN FOR STD (SEXUALLY TRANSMITTED DISEASE): ICD-10-CM

## 2018-10-26 DIAGNOSIS — R87.810 CERVICAL HIGH RISK HPV (HUMAN PAPILLOMAVIRUS) TEST POSITIVE: ICD-10-CM

## 2018-10-26 PROCEDURE — 86696 HERPES SIMPLEX TYPE 2 TEST: CPT | Performed by: NURSE PRACTITIONER

## 2018-10-26 PROCEDURE — 99395 PREV VISIT EST AGE 18-39: CPT | Performed by: NURSE PRACTITIONER

## 2018-10-26 PROCEDURE — 87491 CHLMYD TRACH DNA AMP PROBE: CPT | Performed by: NURSE PRACTITIONER

## 2018-10-26 PROCEDURE — 86780 TREPONEMA PALLIDUM: CPT | Performed by: NURSE PRACTITIONER

## 2018-10-26 PROCEDURE — 87591 N.GONORRHOEAE DNA AMP PROB: CPT | Performed by: NURSE PRACTITIONER

## 2018-10-26 PROCEDURE — 36415 COLL VENOUS BLD VENIPUNCTURE: CPT | Performed by: NURSE PRACTITIONER

## 2018-10-26 PROCEDURE — G0499 HEPB SCREEN HIGH RISK INDIV: HCPCS | Performed by: NURSE PRACTITIONER

## 2018-10-26 PROCEDURE — 87624 HPV HI-RISK TYP POOLED RSLT: CPT | Performed by: NURSE PRACTITIONER

## 2018-10-26 PROCEDURE — G0476 HPV COMBO ASSAY CA SCREEN: HCPCS | Performed by: NURSE PRACTITIONER

## 2018-10-26 PROCEDURE — 86695 HERPES SIMPLEX TYPE 1 TEST: CPT | Performed by: NURSE PRACTITIONER

## 2018-10-26 PROCEDURE — G0145 SCR C/V CYTO,THINLAYER,RESCR: HCPCS | Performed by: NURSE PRACTITIONER

## 2018-10-26 PROCEDURE — 87389 HIV-1 AG W/HIV-1&-2 AB AG IA: CPT | Performed by: NURSE PRACTITIONER

## 2018-10-26 RX ORDER — CLONAZEPAM 0.5 MG/1
TABLET ORAL
Refills: 0 | COMMUNITY
Start: 2018-10-12 | End: 2021-12-15

## 2018-10-26 ASSESSMENT — ANXIETY QUESTIONNAIRES

## 2018-10-26 ASSESSMENT — PATIENT HEALTH QUESTIONNAIRE - PHQ9
SUM OF ALL RESPONSES TO PHQ QUESTIONS 1-9: 0
5. POOR APPETITE OR OVEREATING: NOT AT ALL

## 2018-10-26 NOTE — PROGRESS NOTES
Jose L is a 33 year old  female who presents for annual exam.     Besides routine health maintenance, she has no other health concerns today .    HPI:  The patient's PCP is YAMILE Kaur CNP.  Pt here today for her annual exam. She is feeling well.     She is in a new relationship x3 months and it's going well. She is requesting full STD testing today.     Minimal random spotting twice this year with Mirena IUD.     Hx of abnormal pap last year. Will repeat today.       GYNECOLOGIC HISTORY:    No LMP recorded. Patient is not currently having periods (Reason: IUD).  Her current contraception method is: IUD.  She  reports that she has never smoked. She has never used smokeless tobacco.    Patient is sexually active.  STD testing offered?  Accepted  Last PHQ-9 score on record =   PHQ-9 SCORE 10/26/2018   Total Score 0     Last GAD7 score on record =   ANA-7 SCORE 10/26/2018   Total Score 0     Alcohol Score = 0    HEALTH MAINTENANCE:  Cholesterol: 2017   Total= 165, Triglycerides=26, HDL=68, LDL=92, FBS=93   Last Mammo: none, Result: not applicable, Next Mammo: age 41  Pap: HPV: (+)HPV 18 DNA & (+)Other HR HPV  Lab Results   Component Value Date    PAP ASC-US 2017    PAP NIL 2017    PAP NIL 2016      Colonoscopy:  never, Result: not applicable, Next Colonoscopy: age 51.  Dexa:  never    Health maintenance updated:  yes    HISTORY:  Obstetric History       T0      L0     SAB0   TAB0   Ectopic0   Multiple0   Live Births0           Patient Active Problem List   Diagnosis     High risk HPV infection     Past Surgical History:   Procedure Laterality Date     HC TOOTH EXTRACTION W/FORCEP       MAMMOPLASTY REDUCTION  2017      Social History   Substance Use Topics     Smoking status: Never Smoker     Smokeless tobacco: Never Used     Alcohol use No      Comment: 1-2 glasses of wine per week      Problem (# of Occurrences) Relation (Name,Age of Onset)    Cancer  "(1) Maternal Grandfather: multiple melanoma ? r/t exposure to Agent Orange    Lung Cancer (1) Maternal Grandmother: non smoker       Negative family history of: Asthma, C.A.D., Diabetes, Hypertension            Current Outpatient Prescriptions   Medication Sig     Cholecalciferol (VITAMIN D3 PO) Take by mouth daily     Escitalopram Oxalate (LEXAPRO PO) Take 10 mg by mouth daily     levonorgestrel (MIRENA) 20 MCG/24HR IUD 1 each by Intrauterine route once     melatonin 3 MG tablet Take 3 mg by mouth     Methylphenidate HCl (RITALIN PO)      MULTI-VITAMIN OR TABS 1 TABLET DAILY prn     Zinc Sulfate 220 (50 ZN) MG TABS Take 220 mg by mouth     clonazePAM (KLONOPIN) 0.5 MG tablet      No current facility-administered medications for this visit.      Allergies   Allergen Reactions     Diphenhydramine Visual Disturbance     PN: LW Reaction: makes me crazy  Hallucinations, decreased motor control     Seasonal Allergies        Past medical, surgical, social and family histories were reviewed and updated in EPIC.    ROS:   12 point review of systems negative other than symptoms noted below.    EXAM:  /62 (BP Location: Right arm, Patient Position: Sitting, Cuff Size: Adult Regular)  Pulse 80  Ht 5' 7.5\" (1.715 m)  Wt 190 lb 6.4 oz (86.4 kg)  Breastfeeding? No  BMI 29.38 kg/m2   BMI: Body mass index is 29.38 kg/(m^2).    PHYSICAL EXAM:  Constitutional:  Appearance: Well nourished, well developed, alert, in no acute distress  Neck:  Lymph Nodes:  No lymphadenopathy present    Thyroid:  Gland size normal, nontender, no nodules or masses present  on palpation  Chest:  Respiratory Effort:  Breathing unlabored  Cardiovascular:    Heart: Auscultation:  Regular rate, normal rhythm, no murmurs present  Breasts: Inspection of Breasts:  No lymphadenopathy present., Palpation of Breasts and Axillae:  No masses present on palpation, no breast tenderness., Axillary Lymph Nodes:  No lymphadenopathy present., No nodularity, " asymmetry or nipple discharge bilaterally. and s/p reduction in May 2017. scarring.   Gastrointestinal:   Abdominal Examination:  Abdomen nontender to palpation, tone normal without rigidity or guarding, no masses present, umbilicus without lesions   Liver and Spleen:  No hepatomegaly present, liver nontender to palpation    Hernias:  No hernias present  Lymphatic: Lymph Nodes:  No other lymphadenopathy present  Skin:  General Inspection:  No rashes present, no lesions present, no areas of  discoloration    Genitalia and Groin:  No rashes present, no lesions present, no areas of  discoloration, no masses present  Neurologic/Psychiatric:    Mental Status:  Oriented X3     Pelvic Exam:  External Genitalia:     Normal appearance for age, no discharge present, no tenderness present, no inflammatory lesions present, color normal  Vagina:    Normal vaginal vault without central or paravaginal defects, no discharge present, no inflammatory lesions present, no masses present  Bladder:     Nontender to palpation  Urethra:   Urethral Body:  Urethra palpation normal, urethra structural support normal   Urethral Meatus:  No erythema or lesions present  Cervix:     Appearance healthy, no lesions present, nontender to palpation, no bleeding present, string present  Uterus:     Nontender to palpation, no masses present, position anteflexed, mobility: normal  Adnexa:     No adnexal tenderness present, no adnexal masses present  Perineum:     Perineum within normal limits, no evidence of trauma, no rashes or skin lesions present  Anus:     Anus within normal limits, no hemorrhoids present  Inguinal Lymph Nodes:     No lymphadenopathy present  Pubic Hair:     Normal pubic hair distribution for age  Genitalia and Groin:     No rashes present, no lesions present, no areas of discoloration, no masses present      COUNSELING:   Special attention given to:        Regular exercise       Healthy diet/nutrition       Contraception       Safe  sex practices/STD prevention    BMI: Body mass index is 29.38 kg/(m^2).  Weight management plan: Discussed healthy diet and exercise guidelines and patient will follow up in 12 months in clinic to re-evaluate.    ASSESSMENT:  33 year old female with satisfactory annual exam.    ICD-10-CM    1. Encounter for gynecological examination without abnormal finding Z01.419 Pap imaged thin layer screen with HPV - recommended age 30 - 65     HPV High Risk Types DNA Cervical   2. Screen for STD (sexually transmitted disease) Z11.3 NEISSERIA GONORRHOEA PCR     Treponema Abs w Reflex to RPR and Titer     Herpes Simplex Virus 1 and 2 IgG     HIV Antigen Antibody Combo     Hepatitis B Surface  Antigen   3. Screening for chlamydial disease Z11.8 CHLAMYDIA TRACHOMATIS PCR       PLAN:  Normal gyn exam. IUD in place. Pap updated today. Will need 2 negative pap's to go to every 3 years. Will update on STD testing.     YAMILE Kaur CNP

## 2018-10-26 NOTE — MR AVS SNAPSHOT
"              After Visit Summary   10/26/2018    Jose L Collazo    MRN: [id number]           Patient Information     Date Of Birth          1985        Visit Information        Provider Department      10/26/2018 9:30 AM Jamila Villalba APRN CNP [facility]        Today's Diagnoses     Encounter for gynecological examination without abnormal finding    -  1    Screen for STD (sexually transmitted disease)        Screening for chlamydial disease           Follow-ups after your visit        Follow-up notes from your care team     Return in about 1 year (around 10/26/2019).      Who to contact     If you have questions or need follow up information about today's clinic visit or your schedule please contact [facility] directly at [phone].  Normal or non-critical lab and imaging results will be communicated to you by ProspXhart, letter or phone within 4 business days after the clinic has received the results. If you do not hear from us within 7 days, please contact the clinic through ProspXhart or phone. If you have a critical or abnormal lab result, we will notify you by phone as soon as possible.  Submit refill requests through Amartus or call your pharmacy and they will forward the refill request to us. Please allow 3 business days for your refill to be completed.          Additional Information About Your Visit        MyChart Information     Amartus lets you send messages to your doctor, view your test results, renew your prescriptions, schedule appointments and more. To sign up, go to [email or web address] . Click on \"Log in\" on the left side of the screen, which will take you to the Welcome page. Then click on \"Sign up Now\" on the right side of the page.     You will be asked to enter the access code listed below, as well as some personal information. Please follow the directions to create your username and password.     Your access code is: " "[email or web address]  Expires: 2019  9:07 AM     Your access code will  in 90 days. If you need help or a new code, please call your [facility] clinic or [phone].        Care EveryWhere ID     This is your Care EveryWhere ID. This could be used by other organizations to access your [facility] medical records  [id number]        Your Vitals Were     Pulse Height Breastfeeding? BMI (Body Mass Index)          80 5' 7.5\" (1.715 m) No 29.38 kg/m2         Blood Pressure from Last 3 Encounters:   10/26/18 120/62   18 114/70   17 96/64    Weight from Last 3 Encounters:   10/26/18 190 lb 6.4 oz (86.4 kg)   18 193 lb (87.5 kg)   17 169 lb (76.7 kg)              We Performed the Following     CHLAMYDIA TRACHOMATIS PCR     Hepatitis B Surface  Antigen     Herpes Simplex Virus 1 and 2 IgG     HIV Antigen Antibody Combo     HPV High Risk Types DNA Cervical     NEISSERIA GONORRHOEA PCR     Pap imaged thin layer screen with HPV - recommended age 30 - 65     Treponema Abs w Reflex to RPR and Titer        Primary Care Provider Office Phone # Fax #    Jamila Villalba, YAMILE [phone] [address] [address]        Equal Access to Services     KIM MCDONALD : Hadii aad ku hadasho Soomaali, waaxda luqadaha, qaybta kaalmada adeegyada, addison abbasi . So [facility] [phone].    ATENCIÓN: Si habla español, tiene a de los santos disposición servicios gratuitos de asistencia lingüística. Llame al [phone].    We comply with applicable federal civil rights laws and [place] laws. We do not discriminate on the basis of race, color, national origin, age, disability, sex, sexual orientation, or gender identity.            Thank you!     Thank you for choosing [facility] [place]  for your care. Our goal is always to provide you with excellent care. Hearing back from our patients is one way we can continue to improve our services. Please take a " few minutes to complete the written survey that you may receive in the mail after your visit with us. Thank you!             Your Updated Medication List - Protect others around you: Learn how to safely use, store and throw away your medicines at www.disposemymeds.org.          This list is accurate as of 10/26/18  9:54 AM.  Always use your most recent med list.                   Brand Name Dispense Instructions for use Diagnosis    clonazePAM 0.5 MG tablet    klonoPIN          levonorgestrel 20 MCG/24HR IUD    MIRENA     1 each by Intrauterine route once        LEXAPRO PO      Take 10 mg by mouth daily        melatonin 3 MG tablet      Take 3 mg by mouth        Multi-vitamin Tabs tablet   Generic drug:  multivitamin, therapeutic with minerals      1 TABLET DAILY prn        RITALIN PO           VITAMIN D3 PO      Take by mouth daily        Zinc Sulfate 220 (50 Zn) MG Tabs      Take 220 mg by mouth

## 2018-10-27 ASSESSMENT — ANXIETY QUESTIONNAIRES: GAD7 TOTAL SCORE: 0

## 2018-10-28 LAB
C TRACH DNA SPEC QL NAA+PROBE: NEGATIVE
HSV1 IGG SERPL QL IA: <0.2 AI (ref 0–0.8)
HSV2 IGG SERPL QL IA: 0.7 AI (ref 0–0.8)
N GONORRHOEA DNA SPEC QL NAA+PROBE: NEGATIVE
SPECIMEN SOURCE: NORMAL
SPECIMEN SOURCE: NORMAL
T PALLIDUM AB SER QL: NONREACTIVE

## 2018-10-29 LAB
HBV SURFACE AG SERPL QL IA: NONREACTIVE
HIV 1+2 AB+HIV1 P24 AG SERPL QL IA: NONREACTIVE

## 2018-10-30 LAB
COPATH REPORT: NORMAL
PAP: NORMAL

## 2018-10-31 LAB
FINAL DIAGNOSIS: ABNORMAL
HPV HR 12 DNA CVX QL NAA+PROBE: POSITIVE
HPV16 DNA SPEC QL NAA+PROBE: NEGATIVE
HPV18 DNA SPEC QL NAA+PROBE: POSITIVE
SPECIMEN DESCRIPTION: ABNORMAL
SPECIMEN SOURCE CVX/VAG CYTO: ABNORMAL

## 2018-11-01 NOTE — PROGRESS NOTES
6/3/15 NIL/+ HR HPV 18. Plan: repeat pap in 1 year per provider  8/9/16 NIL pap  7/21/17 NIL/+ HR HPV 18 and other (NOT16).  Plan: colpo  8/17/17 [place] ECC: Atypical cells present.  ASCUS pap, + HR HPV 18 and other. Plan: cotest in 1 year  09/07/18 Patient is lost to pap tracking follow-up.   10/26/18 NIL pap/+ HR HPV 18 & other.  Plan: colpo  11/1/18 left msg  11/2/18 advised of result and follow up  11/30/18 Colpo: visually normal. AIS pap/+ HR HPV 18 & other HR type.  Plan: surgical consult (lks)  12/5/18 ECC: Malignant endocervical glands present (lks)  1/8/19 [facility]-AIS, margins favor AIS. ECC-neg. Plan: repeat [facility] bx and ECC until we get clear margins. ([facility])/L[facility]  5/23/19 Repeat [facility]:  Bx/ECC-neg.  Plan: Cotest in October 2019  10/16/19 NIL pap, Neg HPV.  Plan: cotest in 6 months.  04/28/20 NIL Pap, Neg HPV. Plan cotest in 6 months due 10/28/20. (marcia)

## 2018-11-06 DIAGNOSIS — Z13.1 SCREENING FOR DIABETES MELLITUS: ICD-10-CM

## 2018-11-06 DIAGNOSIS — Z13.6 ENCOUNTER FOR LIPID SCREENING FOR CARDIOVASCULAR DISEASE: Primary | ICD-10-CM

## 2018-11-06 DIAGNOSIS — Z13.220 ENCOUNTER FOR LIPID SCREENING FOR CARDIOVASCULAR DISEASE: Primary | ICD-10-CM

## 2018-11-14 DIAGNOSIS — Z13.220 ENCOUNTER FOR LIPID SCREENING FOR CARDIOVASCULAR DISEASE: ICD-10-CM

## 2018-11-14 DIAGNOSIS — Z13.1 SCREENING FOR DIABETES MELLITUS: ICD-10-CM

## 2018-11-14 DIAGNOSIS — Z13.6 ENCOUNTER FOR LIPID SCREENING FOR CARDIOVASCULAR DISEASE: ICD-10-CM

## 2018-11-14 LAB — GLUCOSE BLD-MCNC: 99 MG/DL (ref 70–99)

## 2018-11-14 PROCEDURE — 82947 ASSAY GLUCOSE BLOOD QUANT: CPT | Performed by: NURSE PRACTITIONER

## 2018-11-14 PROCEDURE — 80061 LIPID PANEL: CPT | Performed by: NURSE PRACTITIONER

## 2018-11-14 PROCEDURE — 36415 COLL VENOUS BLD VENIPUNCTURE: CPT | Performed by: NURSE PRACTITIONER

## 2018-11-14 NOTE — LETTER
11/15/2018     Jose L Collazo  [address]  [facility] [address]        Jose L Collazo your lab results came back normal.       Results for orders placed or performed in visit on 11/14/18   Lipid Profile   Result Value Ref Range    Cholesterol 178 <200 mg/dL    Triglycerides 60 <150 mg/dL    HDL Cholesterol 56 >49 mg/dL    LDL Cholesterol Calculated 110 (H) <100 mg/dL    Non HDL Cholesterol 122 <130 mg/dL   Glucose whole blood   Result Value Ref Range    Glucose Whole Blood 99 70 - 99 mg/dL       Everything looks great. Have a great year!    Cordially,    YAMILE Kaur CNP

## 2018-11-15 LAB
CHOLEST SERPL-MCNC: 178 MG/DL
HDLC SERPL-MCNC: 56 MG/DL
LDLC SERPL CALC-MCNC: 110 MG/DL
NONHDLC SERPL-MCNC: 122 MG/DL
TRIGL SERPL-MCNC: 60 MG/DL

## 2018-11-26 ENCOUNTER — OFFICE VISIT (OUTPATIENT)
Dept: OBGYN | Facility: CLINIC | Age: 33
End: 2018-11-26
Payer: COMMERCIAL

## 2018-11-26 VITALS — BODY MASS INDEX: 28.79 KG/M2 | WEIGHT: 190 LBS | HEIGHT: 68 IN

## 2018-11-26 DIAGNOSIS — B97.7 HIGH RISK HPV INFECTION: Primary | ICD-10-CM

## 2018-11-26 PROCEDURE — 88141 CYTOPATH C/V INTERPRET: CPT | Performed by: OBSTETRICS & GYNECOLOGY

## 2018-11-26 PROCEDURE — 57452 EXAM OF CERVIX W/SCOPE: CPT | Performed by: OBSTETRICS & GYNECOLOGY

## 2018-11-26 PROCEDURE — G0476 HPV COMBO ASSAY CA SCREEN: HCPCS | Performed by: OBSTETRICS & GYNECOLOGY

## 2018-11-26 PROCEDURE — 88175 CYTOPATH C/V AUTO FLUID REDO: CPT | Performed by: OBSTETRICS & GYNECOLOGY

## 2018-11-26 PROCEDURE — 87624 HPV HI-RISK TYP POOLED RSLT: CPT | Performed by: OBSTETRICS & GYNECOLOGY

## 2018-11-26 NOTE — MR AVS SNAPSHOT
"              After Visit Summary   11/26/2018    Jose L Collazo    MRN: [id number]           Patient Information     Date Of Birth          1985        Visit Information        Provider Department      11/26/2018 11:45 AM Matt Ray MD; WE COLPOSCOPE 2 [facility] Rick Collins        Today's Diagnoses     High risk HPV infection    -  1       Follow-ups after your visit        Who to contact     If you have questions or need follow up information about today's clinic visit or your schedule please contact [facility] [place] directly at [phone].  Normal or non-critical lab and imaging results will be communicated to you by MyChart, letter or phone within 4 business days after the clinic has received the results. If you do not hear from us within 7 days, please contact the clinic through MyChart or phone. If you have a critical or abnormal lab result, we will notify you by phone as soon as possible.  Submit refill requests through Synthox or call your pharmacy and they will forward the refill request to us. Please allow 3 business days for your refill to be completed.          Additional Information About Your Visit        Care EveryWhere ID     This is your Care EveryWhere ID. This could be used by other organizations to access your [place] medical records  [id number]        Your Vitals Were     Height Breastfeeding? BMI (Body Mass Index)             5' 7.5\" (1.715 m) No 29.32 kg/m2          Blood Pressure from Last 3 Encounters:   10/26/18 120/62   03/27/18 114/70   08/17/17 96/64    Weight from Last 3 Encounters:   11/26/18 190 lb (86.2 kg)   10/26/18 190 lb 6.4 oz (86.4 kg)   03/27/18 193 lb (87.5 kg)              We Performed the Following     COLPOSCOPY CERVIX/UPPER VAGINA W BX CERVIX     HPV High Risk Types DNA Cervical     Pap imaged thin layer diagnostic with HPV (select HPV order below)        Primary Care Provider Office Phone # Fax #    YAMILE Kaur CNP " [phone]       [address] [facility] [address]  [place] [address]        Equal Access to Services     KIM MCDONALD : Hadii aad ku hadalejandrina Walsh, wacaitlynda kat, calebta kajustineda christie, addison moreno laNawaftorrey levi. So [facility] [phone].    ATENCIÓN: Si habla español, tiene a de los santos disposición servicios gratuitos de asistencia lingüística. Llame al [phone].    We comply with applicable federal civil rights laws and [place] laws. We do not discriminate on the basis of race, color, national origin, age, disability, sex, sexual orientation, or gender identity.            Thank you!     Thank you for choosing [facility] [place]  for your care. Our goal is always to provide you with excellent care. Hearing back from our patients is one way we can continue to improve our services. Please take a few minutes to complete the written survey that you may receive in the mail after your visit with us. Thank you!             Your Updated Medication List - Protect others around you: Learn how to safely use, store and throw away your medicines at www.disposemymeds.org.          This list is accurate as of 11/26/18 11:49 AM.  Always use your most recent med list.                   Brand Name Dispense Instructions for use Diagnosis    clonazePAM 0.5 MG tablet    klonoPIN          levonorgestrel 20 MCG/24HR IUD    MIRENA     1 each by Intrauterine route once        LEXAPRO PO      Take 10 mg by mouth daily        melatonin 3 MG tablet      Take 3 mg by mouth        Multi-vitamin Tabs tablet   Generic drug:  multivitamin, therapeutic with minerals      1 TABLET DAILY prn        RITALIN PO           VITAMIN D3 PO      Take by mouth daily        Zinc Sulfate 220 (50 Zn) MG Tabs      Take 220 mg by mouth

## 2018-11-26 NOTE — PROGRESS NOTES
INDICATIONS:                                                    Is a pregnancy test required: No.  Was a consent obtained?  Yes    Jose L Collazo, is a 33 year old female, who had a recent WNL pap.  HPV 18 and other types (+)pos.  Yes prior history of abnormal pap. Here today for colposcopy. Discussed indication, risks of infection and bleeding.    Her last pap was   Lab Results   Component Value Date    PAP NIL 10/26/2018    .    PROCEDURE:                                                      Cervix is stained with acetic acid and viewed colposcopically. Squamocolumnar junction is visualized in it's entirety. no visible lesions . Biopsy done No. Endocervical curretage Not Done         POST PROCEDURE:                                                      IMPRESSION: High risk HPV infection  PLAN : Await the results of the biopsies.  .  She  tolerated the procedure well. There were no complications. Patient was discharged in stable condition.    Patient advised to call the clinic if excessive bleeding, pelvic pain, or fever.     Follow-up plan based on pathology results.    Matt Ray MD

## 2018-11-30 LAB
COPATH REPORT: ABNORMAL
PAP: ABNORMAL

## 2018-12-05 ENCOUNTER — OFFICE VISIT (OUTPATIENT)
Dept: OBGYN | Facility: CLINIC | Age: 33
End: 2018-12-05
Payer: COMMERCIAL

## 2018-12-05 VITALS — BODY MASS INDEX: 30.83 KG/M2 | SYSTOLIC BLOOD PRESSURE: 118 MMHG | DIASTOLIC BLOOD PRESSURE: 64 MMHG | WEIGHT: 199.8 LBS

## 2018-12-05 DIAGNOSIS — C53.0 ENDOCERVICAL ADENOCARCINOMA (H): Primary | ICD-10-CM

## 2018-12-05 PROCEDURE — 88305 TISSUE EXAM BY PATHOLOGIST: CPT | Performed by: OBSTETRICS & GYNECOLOGY

## 2018-12-05 PROCEDURE — 57505 ENDOCERVICAL CURETTAGE: CPT | Performed by: OBSTETRICS & GYNECOLOGY

## 2018-12-05 PROCEDURE — 88305 TISSUE EXAM BY PATHOLOGIST: CPT | Mod: 26 | Performed by: OBSTETRICS & GYNECOLOGY

## 2018-12-05 NOTE — MR AVS SNAPSHOT
After Visit Summary   12/5/2018    Jose L Collazo    MRN: [id number]           Patient Information     Date Of Birth          1985        Visit Information        Provider Department      12/5/2018 3:00 PM Matt Ray MD [facility] [place]        Today's Diagnoses     Endocervical adenocarcinoma (H)    -  1       Follow-ups after your visit        Who to contact     If you have questions or need follow up information about today's clinic visit or your schedule please contact [facility] [place] directly at [phone].  Normal or non-critical lab and imaging results will be communicated to you by MyChart, letter or phone within 4 business days after the clinic has received the results. If you do not hear from us within 7 days, please contact the clinic through MyChart or phone. If you have a critical or abnormal lab result, we will notify you by phone as soon as possible.  Submit refill requests through Evtron or call your pharmacy and they will forward the refill request to us. Please allow 3 business days for your refill to be completed.          Additional Information About Your Visit        Care EveryWhere ID     This is your Care EveryWhere ID. This could be used by other organizations to access your [facility] medical records  [id number]        Your Vitals Were     Breastfeeding? BMI (Body Mass Index)                No 30.83 kg/m2           Blood Pressure from Last 3 Encounters:   12/05/18 118/64   10/26/18 120/62   03/27/18 114/70    Weight from Last 3 Encounters:   12/05/18 199 lb 12.8 oz (90.6 kg)   11/26/18 190 lb (86.2 kg)   10/26/18 190 lb 6.4 oz (86.4 kg)              We Performed the Following     ENDOMETRIAL BIOPSY W/O CERVICAL DILATION     Surgical pathology exam        Primary Care Provider Office Phone # Fax #    YAMILE Kaur [phone] [address] [facility]  [place] [address]        Equal Access to Services      KIM MCDONALD : Hadii aad dwayne jonny Walsh, waaxda luqadaha, qaybta kaalmada marifereyad, addison fernando haytorrey villdeasandiodilia abbasi . So [facility] [phone].    ATENCIÓN: Si habla español, tiene a de los santos disposición servicios gratuitos de asistencia lingüística. Llame al [phone].    We comply with applicable federal civil rights laws and [place] laws. We do not discriminate on the basis of race, color, national origin, age, disability, sex, sexual orientation, or gender identity.            Thank you!     Thank you for choosing [facility]  for your care. Our goal is always to provide you with excellent care. Hearing back from our patients is one way we can continue to improve our services. Please take a few minutes to complete the written survey that you may receive in the mail after your visit with us. Thank you!             Your Updated Medication List - Protect others around you: Learn how to safely use, store and throw away your medicines at www.disposemymeds.org.          This list is accurate as of 12/5/18  3:38 PM.  Always use your most recent med list.                   Brand Name Dispense Instructions for use Diagnosis    clonazePAM 0.5 MG tablet    klonoPIN          FISH OIL PO           levonorgestrel 20 MCG/24HR IUD    MIRENA     1 each by Intrauterine route once        LEXAPRO PO      Take 10 mg by mouth daily        Multi-vitamin tablet   Generic drug:  multivitamin w/minerals      1 TABLET DAILY prn        RITALIN PO           VITAMIN D3 PO      Take by mouth daily

## 2018-12-05 NOTE — PROGRESS NOTES
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  She had an abnormal Pap that was done at the time of colposcopy.  There were no ectocervical lesions noted.  Endocervical curettage was not performed.  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  An endocervical curettage will be performed today    No LMP recorded. Patient is not currently having periods (Reason: IUD)..   Patient is sexually active, .  Using IUD for contraception.    reports that she has never smoked. She has never used smokeless tobacco.    STD testing offered?  Declined    Health maintenance updated:  yes    Today's PHQ-2 Score:   PHQ-2 (  [employer]) 2017   Q1: Little interest or pleasure in doing things 0   Q2: Feeling down, depressed or hopeless 0   PHQ-2 Score 0     Today's PHQ-9 Score:   PHQ-9 SCORE 10/26/2018   PHQ-9 Total Score 0     Today's ANA-7 Score:   ANA-7 SCORE 10/26/2018   Total Score 0       Problem list and histories reviewed & adjusted, as indicated.  Additional history: as documented.    Patient Active Problem List   Diagnosis     High risk HPV infection     Past Surgical History:   Procedure Laterality Date     HC TOOTH EXTRACTION W/FORCEP       MAMMOPLASTY REDUCTION  2017      Social History   Substance Use Topics     Smoking status: Never Smoker     Smokeless tobacco: Never Used     Alcohol use No      Comment: 1-2 glasses of wine per week      Problem (# of Occurrences) Relation (Name,Age of Onset)    Cancer (1) Maternal Grandfather: multiple melanoma ? r/t exposure to Agent Orange    Lung Cancer (1) Maternal Grandmother: non smoker       Negative family history of: Asthma, C.A.D., Diabetes, Hypertension             Current Outpatient Prescriptions   Medication Sig     Cholecalciferol (VITAMIN D3 PO) Take by mouth daily     clonazePAM (KLONOPIN) 0.5 MG tablet      Escitalopram Oxalate (LEXAPRO PO) Take 10 mg by mouth daily     levonorgestrel (MIRENA) 20 MCG/24HR IUD 1 each by Intrauterine route once     Methylphenidate HCl (RITALIN PO)      MULTI-VITAMIN OR TABS 1 TABLET DAILY prn     Omega-3 Fatty Acids (FISH OIL PO)      No current facility-administered medications for this visit.      Allergies   Allergen Reactions     Diphenhydramine Visual Disturbance     PN: LW Reaction: makes me crazy  Hallucinations, decreased motor control     Seasonal Allergies        ROS:  12 point review of systems negative other than symptoms noted below.    OBJECTIVE:     /64  Wt 199 lb 12.8 oz (90.6 kg)  Breastfeeding? No  BMI 30.83 kg/m2  Body mass index is 30.83 kg/(m^2).    Exam:  Constitutional:  Appearance: Well nourished, well developed alert, in no acute distress  Lymphatic: Lymph Nodes:  No other lymphadenopathy present  Skin:General Inspection:  No rashes present, no lesions present, no areas of discoloration; Genitalia and Groin:  No rashes present, no lesions present, no areas of discoloration, no masses present.  Neurologic/Psychiatric:  Mental Status:  Oriented X3   Pelvic Exam:  External Genitalia:     Normal appearance for age, no discharge present, no tenderness present, no inflammatory lesions present, color normal  Vagina:     Normal vaginal vault without central or paravaginal defects, no discharge present, no inflammatory lesions present, no masses present  Bladder:     Nontender to palpation  Urethra:   Urethral Body:  Urethra palpation normal, urethra structural support normal   Urethral Meatus:  No erythema or lesions present  Cervix:     Appearance healthy, no lesions present, nontender to palpation, no bleeding present  Uterus:     Uterus: firm, normal sized and nontender, midplane in position.   Adnexa:      No adnexal tenderness present, no adnexal masses present  Perineum:     Perineum within normal limits, no evidence of trauma, no rashes or skin lesions present  Anus:     Anus within normal limits, no hemorrhoids present  Inguinal Lymph Nodes:     No lymphadenopathy present  Pubic Hair:     Normal pubic hair distribution for age  Genitalia and Groin:     No rashes present, no lesions present, no areas of discoloration, no masses present     Procedure; after adequate informed consent was obtained a speculum was placed.  The cervix was cleared of any mucus.  IUD strings were present.  A endocervical curettage was obtained and all tissue submitted in one sample to the pathologist.      ASSESSMENT/PLAN:                                                        Patient with adenocarcinoma in situ on a Pap smear with endocervical curettage performed today.  We will contact the patient with disposition.        Matt Ray MD  [facility]

## 2018-12-07 LAB — COPATH REPORT: NORMAL

## 2018-12-10 ENCOUNTER — TELEPHONE (OUTPATIENT)
Dept: OBGYN | Facility: CLINIC | Age: 33
End: 2018-12-10

## 2018-12-10 DIAGNOSIS — C53.0 ENDOCERVICAL ADENOCARCINOMA (H): Primary | ICD-10-CM

## 2018-12-10 NOTE — TELEPHONE ENCOUNTER
Type of surgery: CERVICAL CONIZATION ENDOCERVICAL CONIZATION   Location of surgery: [facility]  Date and time of surgery: 1/8/2019 8:55am ARRIVAL 6:55am  Surgeon: oCry  Pre-Op Appt Date: 1/2/2019  Post-Op Appt Date: TBD   Packet sent out: MAILED 12/10/2018  Pre-cert/Authorization completed:  TBD  Date: 12/10/2018 Kylie katz/[employer] Main  Surgery Scheduler        Order Questions     Question Answer Comment   Procedure name(s) - multi select Cervical conization, endocervical curettage    Is this a multi surgeon case? No    Laterality N/A    Reason for procedure Endocervical adenocarcinoma    Location of Case: [facility]    Surgeon Procedure Time (incision to closure) in minutes (per procedure as applicable) 45    Note:  Surgical Case Time Needed (in minutes)   Patient Class (for admit prior to surgery, specify number of days in comments): Same day (hospital outpatient)    Why can t this outpatient surgery be done at the [facility] or [facility]? -    Anesthesia General    Vendor Needed? No

## 2018-12-19 NOTE — TELEPHONE ENCOUNTER
Talk to patient and told her we could remove her IUD at the time of her surgery if that is what she really wants time.  We will modified consent form that morning

## 2018-12-19 NOTE — TELEPHONE ENCOUNTER
Pt calling to question if she can have her Mirena removed at the same time of her surgery. Pt understands that I will  Need to consults Dr Ray and will contact her with his recommendation.    Will route to Dr Ray to advise

## 2018-12-26 DIAGNOSIS — C53.0 ENDOCERVICAL ADENOCARCINOMA (H): ICD-10-CM

## 2018-12-26 DIAGNOSIS — Z48.816 AFTERCARE FOLLOWING SURGERY OF THE GENITOURINARY SYSTEM: Primary | ICD-10-CM

## 2018-12-26 DIAGNOSIS — Z01.812 PRE-OPERATIVE LABORATORY EXAMINATION: ICD-10-CM

## 2019-01-02 ENCOUNTER — OFFICE VISIT (OUTPATIENT)
Dept: OBGYN | Facility: CLINIC | Age: 34
End: 2019-01-02
Payer: MEDICAID

## 2019-01-02 VITALS
BODY MASS INDEX: 30.01 KG/M2 | DIASTOLIC BLOOD PRESSURE: 70 MMHG | HEIGHT: 68 IN | WEIGHT: 198 LBS | SYSTOLIC BLOOD PRESSURE: 118 MMHG

## 2019-01-02 DIAGNOSIS — C53.0 ENDOCERVICAL ADENOCARCINOMA (H): ICD-10-CM

## 2019-01-02 DIAGNOSIS — Z48.816 AFTERCARE FOLLOWING SURGERY OF THE GENITOURINARY SYSTEM: ICD-10-CM

## 2019-01-02 LAB — HGB BLD-MCNC: 14 G/DL (ref 11.7–15.7)

## 2019-01-02 PROCEDURE — 36415 COLL VENOUS BLD VENIPUNCTURE: CPT | Performed by: OBSTETRICS & GYNECOLOGY

## 2019-01-02 PROCEDURE — 99214 OFFICE O/P EST MOD 30 MIN: CPT | Performed by: OBSTETRICS & GYNECOLOGY

## 2019-01-02 PROCEDURE — 85018 HEMOGLOBIN: CPT | Performed by: OBSTETRICS & GYNECOLOGY

## 2019-01-02 ASSESSMENT — MIFFLIN-ST. JEOR: SCORE: 1656.62

## 2019-01-02 NOTE — TELEPHONE ENCOUNTER
Yun at [facility] [employer] called to advise pt insurance termed 12/31/2018.    Called pt to inquire on new insurance. She stated it should be fine but she did not have time to deal with that today. She would get back to me tomorrow. Advised that is fine but I do need to hear back from her tomorrow in order to keep her surgery on the schedule.  Pt stated understanding    Gail Main  Surgery Scheduler

## 2019-01-02 NOTE — H&P (VIEW-ONLY)
SUBJECTIVE:                                                   Jose L Collazo is a 33 year old female who presents to clinic today for the following health issue(s):  Patient presents with:  Pre-Op Exam: patient is scheduled for surgery with Dr Ray on 19 for cervical conization      HPI: The patient is seen at this time for preoperative clearance for a cervical conization.  She has an IUD in place that will be removed.  The patient has possible adenocarcinoma in situ of the endocervix.  We are performing account for definitive diagnosis.  The risks and complications have been reviewed and accepted.  The patient is healthy.    No LMP recorded. Patient is not currently having periods (Reason: IUD)..   Patient is sexually active, .  Using IUD for contraception.    reports that  has never smoked. she has never used smokeless tobacco.    STD testing offered?  Declined    Health maintenance updated:  yes    Today's PHQ-2 Score:   PHQ-2 (  [employer]) 2017   Q1: Little interest or pleasure in doing things 0   Q2: Feeling down, depressed or hopeless 0   PHQ-2 Score 0     Today's PHQ-9 Score:   PHQ-9 SCORE 10/26/2018   PHQ-9 Total Score 0     Today's ANA-7 Score:   ANA-7 SCORE 10/26/2018   Total Score 0       Problem list and histories reviewed & adjusted, as indicated.  Additional history: as documented.    Patient Active Problem List   Diagnosis     High risk HPV infection     Past Surgical History:   Procedure Laterality Date     HC TOOTH EXTRACTION W/FORCEP       MAMMOPLASTY REDUCTION  2017      Social History     Tobacco Use     Smoking status: Never Smoker     Smokeless tobacco: Never Used   Substance Use Topics     Alcohol use: No     Alcohol/week: 0.0 oz     Comment: 1-2 glasses of wine per week      Problem (# of Occurrences) Relation (Name,Age of Onset)    Cancer (1) Maternal Grandfather: multiple melanoma ? r/t exposure to Agent Orange    Lung Cancer (1) Maternal  "Grandmother: non smoker       Negative family history of: Asthma, C.A.D., Diabetes, Hypertension            Current Outpatient Medications   Medication Sig     levonorgestrel (MIRENA, 52 MG,) 20 MCG/24HR IUD 1 each (20 mcg) by Intrauterine route once for 1 dose     Cholecalciferol (VITAMIN D3 PO) Take by mouth daily     clonazePAM (KLONOPIN) 0.5 MG tablet      Methylphenidate HCl (RITALIN PO)      MULTI-VITAMIN OR TABS 1 TABLET DAILY prn     Omega-3 Fatty Acids (FISH OIL PO)      No current facility-administered medications for this visit.      Allergies   Allergen Reactions     Diphenhydramine Visual Disturbance     PN: LW Reaction: makes me crazy  Hallucinations, decreased motor control     Seasonal Allergies        ROS:  12 point review of systems negative other than symptoms noted below.  Constitutional: Fatigue  Gastrointestinal: Nausea  Skin: Acne    OBJECTIVE:     /70   Ht 1.727 m (5' 8\")   Wt 89.8 kg (198 lb)   BMI 30.11 kg/m    Body mass index is 30.11 kg/m .    Exam:  Constitutional:  Appearance: Well nourished, well developed alert, in no acute distress  Neck:  Lymph Nodes:  No lymphadenopathy present; Thyroid:  Gland size normal, nontender, no nodules or masses present on palpation  Chest:  Respiratory Effort:  Breathing unlabored  Cardiovascular: Heart: Auscultation:  Regular rate, normal rhythm, no murmurs present  Gastrointestinal:  Abdominal Examination:  Abdomen nontender to palpation, tone normal without rigidity or guarding, no masses present, umbilicus without lesions; Liver/Spleen:  No hepatomegaly present, liver nontender to palpation; Hernias:  No hernias present  Lymphatic: Lymph Nodes:  No other lymphadenopathy present  Skin:General Inspection:  No rashes present, no lesions present, no areas of discoloration; Genitalia and Groin:  No rashes present, no lesions present, no areas of discoloration, no masses present.  Neurologic/Psychiatric:  Mental Status:  Oriented X3   No Pelvic " Exam performed today    In-Clinic Test Results:  Results for orders placed or performed in visit on 01/02/19 (from the past 24 hour(s))   Hemoglobin   Result Value Ref Range    Hemoglobin 14.0 11.7 - 15.7 g/dL       ASSESSMENT/PLAN:                                                      Patient with possible adenocarcinoma in situ of the endocervix.  She is admitted for IUD removal, cervical conization, endocervical curettage.  The risks and complications have been reviewed and accepted.          Matt Ray MD  [facility] WOMEN [place]

## 2019-01-02 NOTE — PROGRESS NOTES
SUBJECTIVE:                                                   Jose L Collazo is a 33 year old female who presents to clinic today for the following health issue(s):  Patient presents with:  Pre-Op Exam: patient is scheduled for surgery with Dr Ray on 19 for cervical conization      HPI: The patient is seen at this time for preoperative clearance for a cervical conization.  She has an IUD in place that will be removed.  The patient has possible adenocarcinoma in situ of the endocervix.  We are performing account for definitive diagnosis.  The risks and complications have been reviewed and accepted.  The patient is healthy.    No LMP recorded. Patient is not currently having periods (Reason: IUD)..   Patient is sexually active, .  Using IUD for contraception.    reports that  has never smoked. she has never used smokeless tobacco.    STD testing offered?  Declined    Health maintenance updated:  yes    Today's PHQ-2 Score:   PHQ-2 (  [employer]) 2017   Q1: Little interest or pleasure in doing things 0   Q2: Feeling down, depressed or hopeless 0   PHQ-2 Score 0     Today's PHQ-9 Score:   PHQ-9 SCORE 10/26/2018   PHQ-9 Total Score 0     Today's ANA-7 Score:   ANA-7 SCORE 10/26/2018   Total Score 0       Problem list and histories reviewed & adjusted, as indicated.  Additional history: as documented.    Patient Active Problem List   Diagnosis     High risk HPV infection     Past Surgical History:   Procedure Laterality Date     HC TOOTH EXTRACTION W/FORCEP       MAMMOPLASTY REDUCTION  2017      Social History     Tobacco Use     Smoking status: Never Smoker     Smokeless tobacco: Never Used   Substance Use Topics     Alcohol use: No     Alcohol/week: 0.0 oz     Comment: 1-2 glasses of wine per week      Problem (# of Occurrences) Relation (Name,Age of Onset)    Cancer (1) Maternal Grandfather: multiple melanoma ? r/t exposure to Agent Orange    Lung Cancer (1) Maternal  "Grandmother: non smoker       Negative family history of: Asthma, C.A.D., Diabetes, Hypertension            Current Outpatient Medications   Medication Sig     levonorgestrel (MIRENA, 52 MG,) 20 MCG/24HR IUD 1 each (20 mcg) by Intrauterine route once for 1 dose     Cholecalciferol (VITAMIN D3 PO) Take by mouth daily     clonazePAM (KLONOPIN) 0.5 MG tablet      Methylphenidate HCl (RITALIN PO)      MULTI-VITAMIN OR TABS 1 TABLET DAILY prn     Omega-3 Fatty Acids (FISH OIL PO)      No current facility-administered medications for this visit.      Allergies   Allergen Reactions     Diphenhydramine Visual Disturbance     PN: LW Reaction: makes me crazy  Hallucinations, decreased motor control     Seasonal Allergies        ROS:  12 point review of systems negative other than symptoms noted below.  Constitutional: Fatigue  Gastrointestinal: Nausea  Skin: Acne    OBJECTIVE:     /70   Ht 1.727 m (5' 8\")   Wt 89.8 kg (198 lb)   BMI 30.11 kg/m    Body mass index is 30.11 kg/m .    Exam:  Constitutional:  Appearance: Well nourished, well developed alert, in no acute distress  Neck:  Lymph Nodes:  No lymphadenopathy present; Thyroid:  Gland size normal, nontender, no nodules or masses present on palpation  Chest:  Respiratory Effort:  Breathing unlabored  Cardiovascular: Heart: Auscultation:  Regular rate, normal rhythm, no murmurs present  Gastrointestinal:  Abdominal Examination:  Abdomen nontender to palpation, tone normal without rigidity or guarding, no masses present, umbilicus without lesions; Liver/Spleen:  No hepatomegaly present, liver nontender to palpation; Hernias:  No hernias present  Lymphatic: Lymph Nodes:  No other lymphadenopathy present  Skin:General Inspection:  No rashes present, no lesions present, no areas of discoloration; Genitalia and Groin:  No rashes present, no lesions present, no areas of discoloration, no masses present.  Neurologic/Psychiatric:  Mental Status:  Oriented X3   No Pelvic " Exam performed today    In-Clinic Test Results:  Results for orders placed or performed in visit on 01/02/19 (from the past 24 hour(s))   Hemoglobin   Result Value Ref Range    Hemoglobin 14.0 11.7 - 15.7 g/dL       ASSESSMENT/PLAN:                                                      Patient with possible adenocarcinoma in situ of the endocervix.  She is admitted for IUD removal, cervical conization, endocervical curettage.  The risks and complications have been reviewed and accepted.          Matt Ray MD  [facility] FOR WOMEN [place]

## 2019-01-07 NOTE — TELEPHONE ENCOUNTER
Pt gave new insurance info.   I routed information to  to enter. Could not be verified so was not entered.   Information given is as follows.    ID [id number]  [facility]  Sur[facility]uler

## 2019-01-07 NOTE — H&P
Office Visit     2019  [facility] Matt Barrientos MD   OB/Gyn   Endocervical adenocarcinoma (H)   Dx   Pre-Op Exam     Reason for Visit    Progress Notes              SUBJECTIVE:                                                   Jose L Collazo is a 33 year old female who presents to clinic today for the following health issue(s):  Patient presents with:  Pre-Op Exam: patient is scheduled for surgery with Dr Ray on 19 for cervical conization        HPI: The patient is seen at this time for preoperative clearance for a cervical conization.  She has an IUD in place that will be removed.  The patient has possible adenocarcinoma in situ of the endocervix.  We are performing account for definitive diagnosis.  The risks and complications have been reviewed and accepted.  The patient is healthy.     No LMP recorded. Patient is not currently having periods (Reason: IUD)..   Patient is sexually active, .  Using IUD for contraception.    reports that  has never smoked. she has never used smokeless tobacco.     STD testing offered?  Declined     Health maintenance updated:  yes     Today's PHQ-2 Score:   PHQ-2 (  [employer]) 2017   Q1: Little interest or pleasure in doing things 0   Q2: Feeling down, depressed or hopeless 0   PHQ-2 Score 0      Today's PHQ-9 Score:   PHQ-9 SCORE 10/26/2018   PHQ-9 Total Score 0      Today's ANA-7 Score:   ANA-7 SCORE 10/26/2018   Total Score 0         Problem list and histories reviewed & adjusted, as indicated.  Additional history: as documented.         Patient Active Problem List   Diagnosis     High risk HPV infection             Past Surgical History:   Procedure Laterality Date     HC TOOTH EXTRACTION W/FORCEP        MAMMOPLASTY REDUCTION   2017       Social History            Tobacco Use     Smoking status: Never Smoker     Smokeless tobacco: Never Used   Substance Use Topics     Alcohol use: No       Alcohol/week: 0.0  "oz       Comment: 1-2 glasses of wine per week       Problem (# of Occurrences) Relation (Name,Age of Onset)     Cancer (1) Maternal Grandfather: multiple melanoma ? r/t exposure to Agent Orange     Lung Cancer (1) Maternal Grandmother: non smoker             Negative family history of: Asthma, C.A.D., Diabetes, Hypertension                   Current Outpatient Medications   Medication Sig     levonorgestrel (MIRENA, 52 MG,) 20 MCG/24HR IUD 1 each (20 mcg) by Intrauterine route once for 1 dose     Cholecalciferol (VITAMIN D3 PO) Take by mouth daily     clonazePAM (KLONOPIN) 0.5 MG tablet       Methylphenidate HCl (RITALIN PO)       MULTI-VITAMIN OR TABS 1 TABLET DAILY prn     Omega-3 Fatty Acids (FISH OIL PO)        No current facility-administered medications for this visit.             Allergies   Allergen Reactions     Diphenhydramine Visual Disturbance       PN: LW Reaction: makes me crazy  Hallucinations, decreased motor control     Seasonal Allergies           ROS:  12 point review of systems negative other than symptoms noted below.  Constitutional: Fatigue  Gastrointestinal: Nausea  Skin: Acne     OBJECTIVE:      /70   Ht 1.727 m (5' 8\")   Wt 89.8 kg (198 lb)   BMI 30.11 kg/m    Body mass index is 30.11 kg/m .     Exam:  Constitutional:  Appearance: Well nourished, well developed alert, in no acute distress  Neck:  Lymph Nodes:  No lymphadenopathy present; Thyroid:  Gland size normal, nontender, no nodules or masses present on palpation  Chest:  Respiratory Effort:  Breathing unlabored  Cardiovascular: Heart: Auscultation:  Regular rate, normal rhythm, no murmurs present  Gastrointestinal:  Abdominal Examination:  Abdomen nontender to palpation, tone normal without rigidity or guarding, no masses present, umbilicus without lesions; Liver/Spleen:  No hepatomegaly present, liver nontender to palpation; Hernias:  No hernias present  Lymphatic: Lymph Nodes:  No other lymphadenopathy " "present  Skin:General Inspection:  No rashes present, no lesions present, no areas of discoloration; Genitalia and Groin:  No rashes present, no lesions present, no areas of discoloration, no masses present.  Neurologic/Psychiatric:  Mental Status:  Oriented X3   No Pelvic Exam performed today     In-Clinic Test Results:        Results for orders placed or performed in visit on 01/02/19 (from the past 24 hour(s))   Hemoglobin   Result Value Ref Range     Hemoglobin 14.0 11.7 - 15.7 g/dL         ASSESSMENT/PLAN:                                                       Patient with possible adenocarcinoma in situ of the endocervix.  She is admitted for IUD removal, cervical conization, endocervical curettage.  The risks and complications have been reviewed and accepted.              Matt Ray MD  [facility] FOR [facility] [place]      Instructions      After Visit Summary (Automatic SnapShot taken 1/2/2019)   Additional Documentation     Vitals:    /70    Ht 1.727 m (5' 8\")    Wt 89.8 kg (198 lb)    BMI 30.11 kg/m     BSA 2.08 m          More Vitals    Flowsheets:    NICU VS,    Anthropometrics,    Vitals Reassessment       Encounter Info:    Billing Info,    History,    Allergies,    Detailed Report       AVS Reports     Date/Time Report Action User   1/2/2019  2:12 PM After Visit Summary Automatically Generated Matt Ray MD   Encounter Information      Provider Department Encounter # Joes   1/2/2019 1:45 PM Matt Ray MD [facility] [id number] [facility]   Reviewed this Encounter      Medications Problems Allergies History   Matt Ray MD   Reviewed Family, Medical, Surgical, Tobacco   Julee Cherry [facility]   Reviewed Tobacco   Orders Placed      None   Medication Changes        None      Medication List   Visit Diagnoses        Endocervical adenocarcinoma (H)      Problem List       "

## 2019-01-07 NOTE — TELEPHONE ENCOUNTER
Time change on case. Spoke to pt to advise.    Spoke w/Dalila to make changes.    Epic EXCEL CALENDAR updated      Gail Main  Surgery Scheduler

## 2019-01-08 ENCOUNTER — HOSPITAL ENCOUNTER (OUTPATIENT)
Facility: CLINIC | Age: 34
Discharge: HOME | End: 2019-01-08
Attending: OBSTETRICS & GYNECOLOGY | Admitting: OBSTETRICS & GYNECOLOGY
Payer: MEDICAID

## 2019-01-08 ENCOUNTER — SURGERY (OUTPATIENT)
Age: 34
End: 2019-01-08
Payer: MEDICAID

## 2019-01-08 ENCOUNTER — ANESTHESIA EVENT (OUTPATIENT)
Dept: SURGERY | Facility: CLINIC | Age: 34
End: 2019-01-08
Payer: MEDICAID

## 2019-01-08 ENCOUNTER — ANESTHESIA (OUTPATIENT)
Dept: SURGERY | Facility: CLINIC | Age: 34
End: 2019-01-08
Payer: MEDICAID

## 2019-01-08 VITALS
HEIGHT: 68 IN | OXYGEN SATURATION: 100 % | BODY MASS INDEX: 30.63 KG/M2 | SYSTOLIC BLOOD PRESSURE: 111 MMHG | RESPIRATION RATE: 16 BRPM | WEIGHT: 202.1 LBS | HEART RATE: 73 BPM | DIASTOLIC BLOOD PRESSURE: 72 MMHG | TEMPERATURE: 96.6 F

## 2019-01-08 DIAGNOSIS — C53.0 ENDOCERVICAL ADENOCARCINOMA (H): Primary | ICD-10-CM

## 2019-01-08 LAB — B-HCG SERPL-ACNC: <1 IU/L (ref 0–5)

## 2019-01-08 PROCEDURE — 88342 IMHCHEM/IMCYTCHM 1ST ANTB: CPT | Performed by: OBSTETRICS & GYNECOLOGY

## 2019-01-08 PROCEDURE — 25000128 H RX IP 250 OP 636: Performed by: OBSTETRICS & GYNECOLOGY

## 2019-01-08 PROCEDURE — 84702 CHORIONIC GONADOTROPIN TEST: CPT | Performed by: OBSTETRICS & GYNECOLOGY

## 2019-01-08 PROCEDURE — 25000125 ZZHC RX 250: Performed by: NURSE ANESTHETIST, CERTIFIED REGISTERED

## 2019-01-08 PROCEDURE — 36000050 ZZH SURGERY LEVEL 2 1ST 30 MIN: Performed by: OBSTETRICS & GYNECOLOGY

## 2019-01-08 PROCEDURE — 27210794 ZZH OR GENERAL SUPPLY STERILE: Performed by: OBSTETRICS & GYNECOLOGY

## 2019-01-08 PROCEDURE — 40000170 ZZH STATISTIC PRE-PROCEDURE ASSESSMENT II: Performed by: OBSTETRICS & GYNECOLOGY

## 2019-01-08 PROCEDURE — 58301 REMOVE INTRAUTERINE DEVICE: CPT | Mod: 51 | Performed by: OBSTETRICS & GYNECOLOGY

## 2019-01-08 PROCEDURE — 25000566 ZZH SEVOFLURANE, EA 15 MIN: Performed by: OBSTETRICS & GYNECOLOGY

## 2019-01-08 PROCEDURE — 88305 TISSUE EXAM BY PATHOLOGIST: CPT | Performed by: OBSTETRICS & GYNECOLOGY

## 2019-01-08 PROCEDURE — 88305 TISSUE EXAM BY PATHOLOGIST: CPT | Mod: 26 | Performed by: OBSTETRICS & GYNECOLOGY

## 2019-01-08 PROCEDURE — 25000125 ZZHC RX 250: Performed by: OBSTETRICS & GYNECOLOGY

## 2019-01-08 PROCEDURE — 36000052 ZZH SURGERY LEVEL 2 EA 15 ADDTL MIN: Performed by: OBSTETRICS & GYNECOLOGY

## 2019-01-08 PROCEDURE — 71000012 ZZH RECOVERY PHASE 1 LEVEL 1 FIRST HR: Performed by: OBSTETRICS & GYNECOLOGY

## 2019-01-08 PROCEDURE — 88307 TISSUE EXAM BY PATHOLOGIST: CPT | Performed by: OBSTETRICS & GYNECOLOGY

## 2019-01-08 PROCEDURE — 71000027 ZZH RECOVERY PHASE 2 EACH 15 MINS: Performed by: OBSTETRICS & GYNECOLOGY

## 2019-01-08 PROCEDURE — 25000128 H RX IP 250 OP 636: Performed by: NURSE ANESTHETIST, CERTIFIED REGISTERED

## 2019-01-08 PROCEDURE — 57520 CONIZATION OF CERVIX: CPT | Performed by: OBSTETRICS & GYNECOLOGY

## 2019-01-08 PROCEDURE — 88342 IMHCHEM/IMCYTCHM 1ST ANTB: CPT | Mod: 26 | Performed by: OBSTETRICS & GYNECOLOGY

## 2019-01-08 PROCEDURE — 36415 COLL VENOUS BLD VENIPUNCTURE: CPT | Performed by: OBSTETRICS & GYNECOLOGY

## 2019-01-08 PROCEDURE — 88307 TISSUE EXAM BY PATHOLOGIST: CPT | Mod: 26 | Performed by: OBSTETRICS & GYNECOLOGY

## 2019-01-08 PROCEDURE — 37000009 ZZH ANESTHESIA TECHNICAL FEE, EACH ADDTL 15 MIN: Performed by: OBSTETRICS & GYNECOLOGY

## 2019-01-08 PROCEDURE — 37000008 ZZH ANESTHESIA TECHNICAL FEE, 1ST 30 MIN: Performed by: OBSTETRICS & GYNECOLOGY

## 2019-01-08 RX ORDER — CEFAZOLIN SODIUM 1 G/3ML
1 INJECTION, POWDER, FOR SOLUTION INTRAMUSCULAR; INTRAVENOUS SEE ADMIN INSTRUCTIONS
Status: DISCONTINUED | OUTPATIENT
Start: 2019-01-08 | End: 2019-01-08 | Stop reason: HOSPADM

## 2019-01-08 RX ORDER — KETOROLAC TROMETHAMINE 30 MG/ML
INJECTION, SOLUTION INTRAMUSCULAR; INTRAVENOUS PRN
Status: DISCONTINUED | OUTPATIENT
Start: 2019-01-08 | End: 2019-01-08

## 2019-01-08 RX ORDER — PROPOFOL 10 MG/ML
INJECTION, EMULSION INTRAVENOUS PRN
Status: DISCONTINUED | OUTPATIENT
Start: 2019-01-08 | End: 2019-01-08

## 2019-01-08 RX ORDER — ONDANSETRON 2 MG/ML
4 INJECTION INTRAMUSCULAR; INTRAVENOUS EVERY 30 MIN PRN
Status: DISCONTINUED | OUTPATIENT
Start: 2019-01-08 | End: 2019-01-08 | Stop reason: HOSPADM

## 2019-01-08 RX ORDER — MEPERIDINE HYDROCHLORIDE 25 MG/ML
12.5 INJECTION INTRAMUSCULAR; INTRAVENOUS; SUBCUTANEOUS
Status: DISCONTINUED | OUTPATIENT
Start: 2019-01-08 | End: 2019-01-08 | Stop reason: HOSPADM

## 2019-01-08 RX ORDER — HYDROCODONE BITARTRATE AND ACETAMINOPHEN 5; 325 MG/1; MG/1
1-2 TABLET ORAL EVERY 4 HOURS PRN
Qty: 10 TABLET | Refills: 0 | Status: SHIPPED | OUTPATIENT
Start: 2019-01-08 | End: 2019-03-13

## 2019-01-08 RX ORDER — LIDOCAINE HYDROCHLORIDE 20 MG/ML
INJECTION, SOLUTION INFILTRATION; PERINEURAL PRN
Status: DISCONTINUED | OUTPATIENT
Start: 2019-01-08 | End: 2019-01-08

## 2019-01-08 RX ORDER — HYDROCODONE BITARTRATE AND ACETAMINOPHEN 5; 325 MG/1; MG/1
1 TABLET ORAL
Status: DISCONTINUED | OUTPATIENT
Start: 2019-01-08 | End: 2019-01-08 | Stop reason: HOSPADM

## 2019-01-08 RX ORDER — NALOXONE HYDROCHLORIDE 0.4 MG/ML
.1-.4 INJECTION, SOLUTION INTRAMUSCULAR; INTRAVENOUS; SUBCUTANEOUS
Status: DISCONTINUED | OUTPATIENT
Start: 2019-01-08 | End: 2019-01-08 | Stop reason: HOSPADM

## 2019-01-08 RX ORDER — CEFAZOLIN SODIUM 2 G/100ML
2 INJECTION, SOLUTION INTRAVENOUS
Status: COMPLETED | OUTPATIENT
Start: 2019-01-08 | End: 2019-01-08

## 2019-01-08 RX ORDER — ONDANSETRON 2 MG/ML
INJECTION INTRAMUSCULAR; INTRAVENOUS PRN
Status: DISCONTINUED | OUTPATIENT
Start: 2019-01-08 | End: 2019-01-08

## 2019-01-08 RX ORDER — SODIUM CHLORIDE, SODIUM LACTATE, POTASSIUM CHLORIDE, CALCIUM CHLORIDE 600; 310; 30; 20 MG/100ML; MG/100ML; MG/100ML; MG/100ML
INJECTION, SOLUTION INTRAVENOUS CONTINUOUS
Status: DISCONTINUED | OUTPATIENT
Start: 2019-01-08 | End: 2019-01-08 | Stop reason: HOSPADM

## 2019-01-08 RX ORDER — FENTANYL CITRATE 50 UG/ML
25-50 INJECTION, SOLUTION INTRAMUSCULAR; INTRAVENOUS
Status: DISCONTINUED | OUTPATIENT
Start: 2019-01-08 | End: 2019-01-08 | Stop reason: HOSPADM

## 2019-01-08 RX ORDER — PHENAZOPYRIDINE HYDROCHLORIDE 200 MG/1
200 TABLET, FILM COATED ORAL ONCE
Status: DISCONTINUED | OUTPATIENT
Start: 2019-01-08 | End: 2019-01-08 | Stop reason: HOSPADM

## 2019-01-08 RX ORDER — SODIUM CHLORIDE, SODIUM LACTATE, POTASSIUM CHLORIDE, CALCIUM CHLORIDE 600; 310; 30; 20 MG/100ML; MG/100ML; MG/100ML; MG/100ML
INJECTION, SOLUTION INTRAVENOUS CONTINUOUS PRN
Status: DISCONTINUED | OUTPATIENT
Start: 2019-01-08 | End: 2019-01-08

## 2019-01-08 RX ORDER — DEXAMETHASONE SODIUM PHOSPHATE 4 MG/ML
INJECTION, SOLUTION INTRA-ARTICULAR; INTRALESIONAL; INTRAMUSCULAR; INTRAVENOUS; SOFT TISSUE PRN
Status: DISCONTINUED | OUTPATIENT
Start: 2019-01-08 | End: 2019-01-08

## 2019-01-08 RX ORDER — ONDANSETRON 4 MG/1
4 TABLET, ORALLY DISINTEGRATING ORAL EVERY 30 MIN PRN
Status: DISCONTINUED | OUTPATIENT
Start: 2019-01-08 | End: 2019-01-08 | Stop reason: HOSPADM

## 2019-01-08 RX ORDER — MAGNESIUM HYDROXIDE 1200 MG/15ML
LIQUID ORAL PRN
Status: DISCONTINUED | OUTPATIENT
Start: 2019-01-08 | End: 2019-01-08 | Stop reason: HOSPADM

## 2019-01-08 RX ORDER — IODINE AND POTASSIUM IODIDE 50; 100 MG/ML; MG/ML
LIQUID ORAL PRN
Status: DISCONTINUED | OUTPATIENT
Start: 2019-01-08 | End: 2019-01-08 | Stop reason: HOSPADM

## 2019-01-08 RX ORDER — HYDROMORPHONE HYDROCHLORIDE 1 MG/ML
.3-.5 INJECTION, SOLUTION INTRAMUSCULAR; INTRAVENOUS; SUBCUTANEOUS EVERY 10 MIN PRN
Status: DISCONTINUED | OUTPATIENT
Start: 2019-01-08 | End: 2019-01-08 | Stop reason: HOSPADM

## 2019-01-08 RX ORDER — FENTANYL CITRATE 50 UG/ML
INJECTION, SOLUTION INTRAMUSCULAR; INTRAVENOUS PRN
Status: DISCONTINUED | OUTPATIENT
Start: 2019-01-08 | End: 2019-01-08

## 2019-01-08 RX ORDER — PROPOFOL 10 MG/ML
INJECTION, EMULSION INTRAVENOUS CONTINUOUS PRN
Status: DISCONTINUED | OUTPATIENT
Start: 2019-01-08 | End: 2019-01-08

## 2019-01-08 RX ADMIN — FENTANYL CITRATE 25 MCG: 50 INJECTION, SOLUTION INTRAMUSCULAR; INTRAVENOUS at 08:28

## 2019-01-08 RX ADMIN — PROPOFOL 200 MG: 10 INJECTION, EMULSION INTRAVENOUS at 08:12

## 2019-01-08 RX ADMIN — MIDAZOLAM 0.5 MG: 1 INJECTION INTRAMUSCULAR; INTRAVENOUS at 08:12

## 2019-01-08 RX ADMIN — LIDOCAINE HYDROCHLORIDE 50 MG: 20 INJECTION, SOLUTION INFILTRATION; PERINEURAL at 08:12

## 2019-01-08 RX ADMIN — PHENYLEPHRINE HYDROCHLORIDE 100 MCG: 10 INJECTION, SOLUTION INTRAMUSCULAR; INTRAVENOUS; SUBCUTANEOUS at 08:39

## 2019-01-08 RX ADMIN — ONDANSETRON 4 MG: 2 INJECTION INTRAMUSCULAR; INTRAVENOUS at 08:21

## 2019-01-08 RX ADMIN — FENTANYL CITRATE 50 MCG: 50 INJECTION, SOLUTION INTRAMUSCULAR; INTRAVENOUS at 08:21

## 2019-01-08 RX ADMIN — CEFAZOLIN SODIUM 2 G: 2 INJECTION, SOLUTION INTRAVENOUS at 08:21

## 2019-01-08 RX ADMIN — DEXAMETHASONE SODIUM PHOSPHATE 4 MG: 4 INJECTION, SOLUTION INTRA-ARTICULAR; INTRALESIONAL; INTRAMUSCULAR; INTRAVENOUS; SOFT TISSUE at 08:21

## 2019-01-08 RX ADMIN — SODIUM CHLORIDE, POTASSIUM CHLORIDE, SODIUM LACTATE AND CALCIUM CHLORIDE: 600; 310; 30; 20 INJECTION, SOLUTION INTRAVENOUS at 08:09

## 2019-01-08 RX ADMIN — SODIUM CHLORIDE 1000 ML: 900 IRRIGANT IRRIGATION at 08:28

## 2019-01-08 RX ADMIN — Medication 5 ML: at 08:28

## 2019-01-08 RX ADMIN — FENTANYL CITRATE 25 MCG: 50 INJECTION, SOLUTION INTRAMUSCULAR; INTRAVENOUS at 08:55

## 2019-01-08 RX ADMIN — KETOROLAC TROMETHAMINE 30 MG: 30 INJECTION, SOLUTION INTRAMUSCULAR at 08:39

## 2019-01-08 RX ADMIN — PROPOFOL 150 MCG/KG/MIN: 10 INJECTION, EMULSION INTRAVENOUS at 08:12

## 2019-01-08 ASSESSMENT — COPD QUESTIONNAIRES: COPD: 0

## 2019-01-08 ASSESSMENT — MIFFLIN-ST. JEOR: SCORE: 1670.22

## 2019-01-08 NOTE — ANESTHESIA POSTPROCEDURE EVALUATION
Patient: Jose L Collazo    Procedure(s):  CERVICAL CONIZATION  ENDOCERVICAL CURETTAGE    Diagnosis:endocervical adenocarcinoma  Diagnosis Additional Information: No value filed.    Anesthesia Type:  General, LMA    Note:  Anesthesia Post Evaluation    Patient location during evaluation: PACU  Patient participation: Able to fully participate in evaluation  Level of consciousness: awake  Pain management: adequate  Airway patency: patent  Cardiovascular status: acceptable  Respiratory status: acceptable  Hydration status: acceptable  PONV: controlled     Anesthetic complications: None          Last vitals:  Vitals:    01/08/19 0930 01/08/19 0945 01/08/19 1014   BP: 104/63 108/65 111/72   Pulse:      Resp: 15 16 16   Temp: 35.9  C (96.6  F)     SpO2: 99% 99% 100%         Electronically Signed By: Hugo Barrios MD  January 8, 2019  10:17 AM

## 2019-01-08 NOTE — ANESTHESIA PREPROCEDURE EVALUATION
Anesthesia Pre-Procedure Evaluation    Patient: Jose L Collazo   MRN: [id number] : 1985          Preoperative Diagnosis: endocervical adenocarcinoma    Procedure(s):  CERVICAL CONIZATION  ENDOCERVICAL CURETTAGE    Past Medical History:   Diagnosis Date     Abnormal Pap smear of cervix      ADHD (attention deficit hyperactivity disorder)     on Ritalin     Ankle fracture     untreated at 14yrs old, left     High risk HPV infection     HR 18     Other psoriasis     Uses Vanessa cream, psoriatic arthritis     Past Surgical History:   Procedure Laterality Date     HC TOOTH EXTRACTION W/FORCEP       MAMMOPLASTY REDUCTION  2017       Anesthesia Evaluation     . Pt has had prior anesthetic.     No history of anesthetic complications          ROS/MED HX    ENT/Pulmonary:     (+)allergic rhinitis, , . .   (-) asthma, COPD and sleep apnea   Neurologic:       Cardiovascular:         METS/Exercise Tolerance:     Hematologic:         Musculoskeletal:         GI/Hepatic:     (+) GERD (asymptomatic)       Renal/Genitourinary:     (+) Other Renal/ Genitourinary, cervical carcinoma in situ      Endo:         Psychiatric:     (+) psychiatric history anxiety      Infectious Disease:         Malignancy:         Other:                          Physical Exam      Airway   Mallampati: II  TM distance: >3 FB  Neck ROM: full    Dental   (+) upper retainer    Cardiovascular   Rhythm and rate: regular      Pulmonary    breath sounds clear to auscultation            Lab Results   Component Value Date    HGB 14.0 2019    TSH 2.03 2017       Preop Vitals  BP Readings from Last 3 Encounters:   19 116/69   19 118/70   18 118/64    Pulse Readings from Last 3 Encounters:   19 73   10/26/18 80   18 72      Resp Readings from Last 3 Encounters:   19 18    SpO2 Readings from Last 3 Encounters:   19 99%      Temp Readings from Last 1 Encounters:   19 36.1  C (97  F)  "(Oral)    Ht Readings from Last 1 Encounters:   01/08/19 1.727 m (5' 8\")      Wt Readings from Last 1 Encounters:   01/08/19 91.7 kg (202 lb 1.6 oz)    Estimated body mass index is 30.73 kg/m  as calculated from the following:    Height as of this encounter: 1.727 m (5' 8\").    Weight as of this encounter: 91.7 kg (202 lb 1.6 oz).       Anesthesia Plan      History & Physical Review  History and physical reviewed and following examination; no interval change.    ASA Status:  2 .    NPO Status:  > 8 hours    Plan for General and LMA   PONV prophylaxis:  Ondansetron (or other 5HT-3) and Dexamethasone or Solumedrol  Propofol gtt       Postoperative Care      Consents  Anesthetic plan, risks, benefits and alternatives discussed with:  Patient..                 Carmen Ramachandran  "

## 2019-01-08 NOTE — DISCHARGE INSTRUCTIONS
Same Day Surgery Discharge Instructions for  Sedation and General Anesthesia       It's not unusual to feel dizzy, light-headed or faint for up to 24 hours after surgery or while taking pain medication.  If you have these symptoms: sit for a few minutes before standing and have someone assist you when you get up to walk or use the bathroom.      You should rest and relax for the next 24 hours. We recommend you make arrangements to have an adult stay with you for at least 24 hours after your discharge.  Avoid hazardous and strenuous activity.      DO NOT DRIVE any vehicle or operate mechanical equipment for 24 hours following the end of your surgery.  Even though you may feel normal, your reactions may be affected by the medication you have received.      Do not drink alcoholic beverages for 24 hours following surgery.       Slowly progress to your regular diet as you feel able. It's not unusual to feel nauseated and/or vomit after receiving anesthesia.  If you develop these symptoms, drink clear liquids (apple juice, ginger ale, broth, 7-up, etc. ) until you feel better.  If your nausea and vomiting persists for 24 hours, please notify your surgeon.        All narcotic pain medications, along with inactivity and anesthesia, can cause constipation. Drinking plenty of liquids and increasing fiber intake will help.      For any questions of a medical nature, call your surgeon.      Do not make important decisions for 24 hours.      If you had general anesthesia, you may have a sore throat for a couple of days related to the breathing tube used during surgery.  You may use Cepacol lozenges to help with this discomfort.  If it worsens or if you develop a fever, contact your surgeon.       If you feel your pain is not well managed with the pain medications prescribed by your surgeon, please contact your surgeon's office to let them know so they can address your concerns.     Today you received Toradol, an antiinflammatory  medication similar to Ibuprofen.  You should not take other antiinflammatory medication, such as Ibuprofen, Motrin, Advil, Aleve, Naprosyn, etc until 2:40 p.m.       [facility]  Discharge Instructions  Activity  You may resume normal activities including lifting as needed.  It is permissible to climb stairs. You may drive after 24 hours as long as you are not taking narcotic pain pills.  Baths or showers are perfectly acceptable.      Vaginal Discharge  You may have some vaginal bleeding or discharge for about a week after procedure.  Call if soaking more than one pad an hour.    Temperature  If you develop temperature elevations to over 101  Fahrenheit, your physician should be called immediately.    Diet  [place] or light diet is advisable the day of surgery.  If nausea persists, continue this diet.  If severe, call.    Follow-up  Make an appointment in 2 weeks if instructed to at: [phone]      [facility]  [phone]        **If you have questions or concerns about your procedure,   call Dr. Ray at [phone]**

## 2019-01-08 NOTE — ANESTHESIA CARE TRANSFER NOTE
Patient: Jose L Collazo    Procedure(s):  CERVICAL CONIZATION  ENDOCERVICAL CURETTAGE    Diagnosis: endocervical adenocarcinoma  Diagnosis Additional Information: No value filed.    Anesthesia Type:   General, LMA     Note:  Airway :Face Mask  Patient transferred to:PACU  Handoff Report: Identifed the Patient, Identified the Reponsible Provider, Reviewed the pertinent medical history, Discussed the surgical course, Reviewed Intra-OP anesthesia mangement and issues during anesthesia, Set expectations for post-procedure period and Allowed opportunity for questions and acknowledgement of understanding      Vitals: (Last set prior to Anesthesia Care Transfer)    CRNA VITALS  1/8/2019 0823 - 1/8/2019 0859      1/8/2019             Pulse:  72    SpO2:  99 %    Resp Rate (observed):  14                Electronically Signed By: YAMILE Zapata CRNA  January 8, 2019  8:59 AM

## 2019-01-08 NOTE — BRIEF OP NOTE
[facility] Brief Operative Note    Pre-operative diagnosis: endocervical adenocarcinoma in situ   Post-operative diagnosis same   Procedure: Procedure(s):  CERVICAL CONIZATION  ENDOCERVICAL CURETTAGE,REMOVAL OF IUD   Surgeon(s): Surgeon(s) and Role:     * Matt Ray MD - Primary   Estimated blood loss: * No values recorded between 1/8/2019  8:28 AM and 1/8/2019  8:46 AM *    Specimens: ID Type Source Tests Collected by Time Destination   A : CERVICAL CONIZATION Tissue Cervix SURGICAL PATHOLOGY EXAM Matt Ray MD 1/8/2019  8:38 AM    B : ENDOCERVICAL CURETTINGS Tissue Endocervical SURGICAL PATHOLOGY EXAM Matt Ray MD 1/8/2019  8:40 AM       Findings: SEE OP NOTE      no

## 2019-01-14 ENCOUNTER — TELEPHONE (OUTPATIENT)
Dept: OBGYN | Facility: CLINIC | Age: 34
End: 2019-01-14

## 2019-01-14 LAB — COPATH REPORT: NORMAL

## 2019-01-14 NOTE — TELEPHONE ENCOUNTER
Pt called back- has more questions for Dr Ray and does not want to wait till her post op appt on Jan 23,19.    Will leave note for Dr Ray - Pt said there is no ambrocio.

## 2019-01-23 ENCOUNTER — OFFICE VISIT (OUTPATIENT)
Dept: OBGYN | Facility: CLINIC | Age: 34
End: 2019-01-23
Payer: MEDICAID

## 2019-01-23 VITALS — BODY MASS INDEX: 29.92 KG/M2 | WEIGHT: 196.8 LBS | DIASTOLIC BLOOD PRESSURE: 70 MMHG | SYSTOLIC BLOOD PRESSURE: 116 MMHG

## 2019-01-23 DIAGNOSIS — Z01.812 PRE-OPERATIVE LABORATORY EXAMINATION: ICD-10-CM

## 2019-01-23 DIAGNOSIS — C53.0 ENDOCERVICAL ADENOCARCINOMA (H): ICD-10-CM

## 2019-01-23 DIAGNOSIS — Z09 POSTOP CHECK: Primary | ICD-10-CM

## 2019-01-23 LAB — HGB BLD-MCNC: 13.3 G/DL (ref 11.7–15.7)

## 2019-01-23 PROCEDURE — 85018 HEMOGLOBIN: CPT | Performed by: OBSTETRICS & GYNECOLOGY

## 2019-01-23 PROCEDURE — 99024 POSTOP FOLLOW-UP VISIT: CPT | Performed by: OBSTETRICS & GYNECOLOGY

## 2019-01-23 PROCEDURE — 36415 COLL VENOUS BLD VENIPUNCTURE: CPT | Performed by: OBSTETRICS & GYNECOLOGY

## 2019-01-23 NOTE — PROGRESS NOTES
The patient is seen at this time in follow-up of a cone biopsy and ECC that showed adenocarcinoma in situ on the cc.  The cone site is healing well at this time.  We went through a long discussion of next steps.  After consultation with GYN oncology we need to do a second cone biopsy and ECC until we get clear margins.  The patient fully understands this.  As she desires fertility definitive surgery with hysterectomy is deferred.

## 2019-03-13 ENCOUNTER — OFFICE VISIT (OUTPATIENT)
Dept: OBGYN | Facility: CLINIC | Age: 34
End: 2019-03-13
Payer: COMMERCIAL

## 2019-03-13 ENCOUNTER — TELEPHONE (OUTPATIENT)
Dept: OBGYN | Facility: CLINIC | Age: 34
End: 2019-03-13

## 2019-03-13 VITALS
HEIGHT: 68 IN | SYSTOLIC BLOOD PRESSURE: 120 MMHG | DIASTOLIC BLOOD PRESSURE: 82 MMHG | BODY MASS INDEX: 28.19 KG/M2 | WEIGHT: 186 LBS

## 2019-03-13 DIAGNOSIS — C53.9 ADENOCARCINOMA OF CERVIX (H): Primary | ICD-10-CM

## 2019-03-13 PROCEDURE — 99213 OFFICE O/P EST LOW 20 MIN: CPT | Performed by: OBSTETRICS & GYNECOLOGY

## 2019-03-13 ASSESSMENT — MIFFLIN-ST. JEOR: SCORE: 1597.19

## 2019-03-13 NOTE — TELEPHONE ENCOUNTER
Type of surgery: CX CONIZATION ECC  Location of surgery: [facility] OR  Date and time of surgery: 5/23/2019 7:20a ARRIVAL 5:30a  Surgeon: Cory  Pre-Op Appt Date: 5/15/2019  Post-Op Appt Date: TBD   Packet sent out: HANDED 3/13/2019  Pre-cert/Authorization completed:  TBD  Date: 3/13/2019 Kylie katz/Airam Main  Surgery Scheduler        Order Questions     Question Answer Comment   Procedure name(s) - multi select Cervical conization, endocervical curettage    Is this a multi surgeon case? No    Laterality N/A    Reason for procedure Adenocarcinoma of the endocervix-in situ    Location of Case: [facility] OR    Surgeon Procedure Time (incision to closure) in minutes (per procedure as applicable) 45    Note:  Surgical Case Time Needed (in minutes)   Patient Class (for admit prior to surgery, specify number of days in comments): Same day (hospital outpatient)    Why can t this outpatient surgery be done at the [facility] or [facility]? -    Anesthesia General    Vendor Needed? No

## 2019-03-13 NOTE — LETTER
April 18, 2019    Jose L Collazo                                                                                                                [address]  [facility] [address]      Dear Jose L,    We have made effort to obtain an accurate quote from your insurance company based on the information we have on file. Your actual coverage may differ. [facility] can NOT guarantee coverage. We recommend that if you have questions regarding coverage to call your insurance company. Our billing department is happy to assist you with your insurance claim but you are responsible for all services rendered whether or not they are paid by your insurance provider. The below information was obtained from your insurance company but again, we do not guarantee coverage.       Insurance Co [email or web address] Phone # [phone]  ID [id number]         Group [id number]  Gail spoke w/ Lucero on 4/18/2019    Policy Eff Date 3/1/2019 and current Yes  CoInsurance (covered at) 100%   Deductible $NA met to date $NA  Max Out Of Pocket (MOOP) $NA met to date $NA  CoPay $NA  Prior Auth Required:  No  Pre Existing Condition No  Surgeon E [facility] In Network Yes  Hospital [facility] In Network Yes  Referral Needed:  No.  Mailed to Patient Yes                  If No Document why by date                  If Yes                                   Date 4/18/2019      Sincerely,         Gail Main  Surgery Scheduler

## 2019-03-13 NOTE — PROGRESS NOTES
SUBJECTIVE:                                                   Jose L Collazo is a 34 year old female who presents to clinic today for the following health issue(s):  Patient presents with:  Pre-Op Exam: second cone biopsy and ECC      HPI: The patient is seen at this time for further evaluation for a second cervical conization.  She had a positive endocervical margin for adenocarcinoma in situ.  Her cycles have been normal.  She has had no abnormal discharge.  She understands the implications of getting clear surgical margins for this disease.  She does have a strong desire for childbearing.      Patient's last menstrual period was 2019 (approximate)..   Patient is sexually active, .   reports that  has never smoked. she has never used smokeless tobacco.    STD testing offered?  Declined    Health maintenance updated:  yes    Problem list and histories reviewed & adjusted, as indicated.  Additional history: as documented.    Patient Active Problem List   Diagnosis     High risk HPV infection     Endocervical adenocarcinoma (H)     Past Surgical History:   Procedure Laterality Date     CONIZATION N/A 2019    Procedure: CERVICAL CONIZATION;  Surgeon: Matt Ray MD;  Location:  OR     DILATION AND CURETTAGE N/A 2019    Procedure: ENDOCERVICAL CURETTAGE;  Surgeon: Matt Ray MD;  Location:  OR     HC TOOTH EXTRACTION W/FORCEP       MAMMOPLASTY REDUCTION  2017      Social History     Tobacco Use     Smoking status: Never Smoker     Smokeless tobacco: Never Used   Substance Use Topics     Alcohol use: No     Alcohol/week: 0.0 oz     Comment: 1-2 glasses of wine per week      Problem (# of Occurrences) Relation (Name,Age of Onset)    Cancer (1) Maternal Grandfather: multiple melanoma ? r/t exposure to Agent Orange    Lung Cancer (1) Maternal Grandmother: non smoker       Negative family history of: Asthma, C.A.D., Diabetes, Hypertension            Current  "Outpatient Medications   Medication Sig     Cholecalciferol (VITAMIN D3 PO) Take by mouth daily     MULTI-VITAMIN OR TABS 1 TABLET DAILY prn     Omega-3 Fatty Acids (FISH OIL PO)      clonazePAM (KLONOPIN) 0.5 MG tablet      No current facility-administered medications for this visit.      Allergies   Allergen Reactions     Diphenhydramine Visual Disturbance     PN: LW Reaction: makes me crazy  Hallucinations, decreased motor control     Seasonal Allergies        ROS:  12 point review of systems negative other than symptoms noted below.    OBJECTIVE:     /82   Ht 1.727 m (5' 8\")   Wt 84.4 kg (186 lb)   LMP 02/20/2019 (Approximate)   Breastfeeding? No   BMI 28.28 kg/m    Body mass index is 28.28 kg/m .    Exam:  Constitutional:  Appearance: Well nourished, well developed alert, in no acute distress  Gastrointestinal:  Abdominal Examination:  Abdomen nontender to palpation, tone normal without rigidity or guarding, no masses present, umbilicus without lesions; Liver/Spleen:  No hepatomegaly present, liver nontender to palpation; Hernias:  No hernias present  Lymphatic: Lymph Nodes:  No other lymphadenopathy present  Skin:General Inspection:  No rashes present, no lesions present, no areas of discoloration; Genitalia and Groin:  No rashes present, no lesions present, no areas of discoloration, no masses present.  Neurologic/Psychiatric:  Mental Status:  Oriented X3   Pelvic Exam:  External Genitalia:     Normal appearance for age, no discharge present, no tenderness present, no inflammatory lesions present, color normal  Vagina:     Normal vaginal vault without central or paravaginal defects, no discharge present, no inflammatory lesions present, no masses present  Bladder:     Nontender to palpation  Urethra:   Urethral Body:  Urethra palpation normal, urethra structural support normal   Urethral Meatus:  No erythema or lesions present  Cervix:     Appearance healthy, no lesions present, nontender to " palpation, no bleeding present  Uterus:     Uterus: firm, normal sized and nontender, midplane in position.   Adnexa:     No adnexal tenderness present, no adnexal masses present  Perineum:     Perineum within normal limits, no evidence of trauma, no rashes or skin lesions present  Anus:     Anus within normal limits, no hemorrhoids present  Inguinal Lymph Nodes:     No lymphadenopathy present  Pubic Hair:     Normal pubic hair distribution for age  Genitalia and Groin:     No rashes present, no lesions present, no areas of discoloration, no masses present       In-Clinic Test Results:      ASSESSMENT/PLAN:                                                        Patient seen in follow-up of a positive endocervical margin for adenocarcinoma in situ of the endocervix.  Her cervix is healed very well at this time.  She has a desire to do this in mid May and we will schedule it and see her back for preop at that time.        Matt Ray MD  [facility] [place]

## 2019-04-18 NOTE — TELEPHONE ENCOUNTER
Insurance Co [employer] Phone # [phone]  ID [id number]         Group [id number]  Gail chowdary w/ Lucero on 4/18/2019    Policy Eff Date 3/1/2019 and current Yes  CoInsurance (covered at) 100%   Deductible $NA met to date $NA  Max Out Of Pocket (MOOP) $NA met to date $NA  CoPay $NA  Prior Auth Required:  No  Pre Existing Condition No  Surgeon E [facility] In Network Yes  Hospital [facility] In Network Yes  Referral Needed:  No.  Mailed to Patient Yes                  If No Document why by date                  If Yes                                   Date 4/18/2019      Gail Main  Surgery Scheduler

## 2019-05-10 NOTE — PROGRESS NOTES
SUBJECTIVE:                                                   Jose L Collazo is a 34 year old female who presents to clinic today for the following health issue(s):  Patient presents with:  Pre-Op Exam: 19: Cervical conization, endocervical curettage      HPI: The patient is seen at this time for preoperative clearance for a cervical conization.  The patient had a positive endocervical margin for adenocarcinoma in situ from her colon last year.  She has regular cycles and bleeds 4 days.  She understands the necessity of going back and repeating a cone to get clear margins and clear ECC.      No LMP recorded. Patient is premenopausal..   Patient is sexually active, .  Using condoms for contraception.    reports that she has never smoked. She has never used smokeless tobacco.    STD testing offered?  Declined    Health maintenance updated:  yes    Today's PHQ-2 Score:   PHQ-2 (  [employer]) 5/15/2019   Q1: Little interest or pleasure in doing things 0   Q2: Feeling down, depressed or hopeless 0   PHQ-2 Score 0     Today's PHQ-9 Score:   PHQ-9 SCORE 10/26/2018   PHQ-9 Total Score 0     Today's ANA-7 Score:   ANA-7 SCORE 10/26/2018   Total Score 0       Problem list and histories reviewed & adjusted, as indicated.  Additional history: as documented.    Patient Active Problem List   Diagnosis     High risk HPV infection     Endocervical adenocarcinoma (H)     Past Surgical History:   Procedure Laterality Date     CONIZATION N/A 2019    Procedure: CERVICAL CONIZATION;  Surgeon: Matt Ray MD;  Location:  OR     DILATION AND CURETTAGE N/A 2019    Procedure: ENDOCERVICAL CURETTAGE;  Surgeon: Matt Ray MD;  Location:  OR      TOOTH EXTRACTION W/FORCEP       MAMMOPLASTY REDUCTION  2017      Social History     Tobacco Use     Smoking status: Never Smoker     Smokeless tobacco: Never Used   Substance Use Topics     Alcohol use: No     Alcohol/week: 0.0 oz      Comment: 1-2 glasses of wine per week      Problem (# of Occurrences) Relation (Name,Age of Onset)    Cancer (1) Maternal Grandfather: multiple melanoma ? r/t exposure to Agent Orange    Lung Cancer (1) Maternal Grandmother: non smoker       Negative family history of: Asthma, C.A.D., Diabetes, Hypertension            Current Outpatient Medications   Medication Sig     methylphenidate (RITALIN) 20 MG tablet      Cholecalciferol (VITAMIN D3 PO) Take by mouth daily     clonazePAM (KLONOPIN) 0.5 MG tablet      MULTI-VITAMIN OR TABS 1 TABLET DAILY prn     Omega-3 Fatty Acids (FISH OIL PO)      No current facility-administered medications for this visit.      Allergies   Allergen Reactions     Diphenhydramine Visual Disturbance     PN: LW Reaction: makes me crazy  Hallucinations, decreased motor control     Seasonal Allergies        ROS:  12 point review of systems negative other than symptoms noted below.    OBJECTIVE:     There were no vitals taken for this visit.  There is no height or weight on file to calculate BMI.    Exam:  Constitutional:  Appearance: Well nourished, well developed alert, in no acute distress  Neck:  Lymph Nodes:  No lymphadenopathy present; Thyroid:  Gland size normal, nontender, no nodules or masses present on palpation  Chest:  Respiratory Effort:  Breathing unlabored  Cardiovascular: Heart: Auscultation:  Regular rate, normal rhythm, no murmurs present  Gastrointestinal:  Abdominal Examination:  Abdomen nontender to palpation, tone normal without rigidity or guarding, no masses present, umbilicus without lesions; Liver/Spleen:  No hepatomegaly present, liver nontender to palpation; Hernias:  No hernias present  Lymphatic: Lymph Nodes:  No other lymphadenopathy present  Skin:General Inspection:  No rashes present, no lesions present, no areas of discoloration; Genitalia and Groin:  No rashes present, no lesions present, no areas of discoloration, no masses present.  Neurologic/Psychiatric:   Mental Status:  Oriented X3   No Pelvic Exam performed     In-Clinic Test Results:      ASSESSMENT/PLAN:                                                        Patient with abnormal margin from previous cone admitted at this time for repeat conization and endocervical curettage.  The risks and complications have been reviewed and accepted.        Matt Ray MD  [facility]

## 2019-05-15 ENCOUNTER — OFFICE VISIT (OUTPATIENT)
Dept: OBGYN | Facility: CLINIC | Age: 34
End: 2019-05-15
Payer: COMMERCIAL

## 2019-05-15 DIAGNOSIS — C53.9 CERVICAL ADENOCARCINOMA (H): Primary | ICD-10-CM

## 2019-05-15 PROCEDURE — 99214 OFFICE O/P EST MOD 30 MIN: CPT | Performed by: OBSTETRICS & GYNECOLOGY

## 2019-05-15 RX ORDER — METHYLPHENIDATE HYDROCHLORIDE 20 MG/1
20 TABLET ORAL 3 TIMES DAILY PRN
Refills: 0 | COMMUNITY
Start: 2019-05-13 | End: 2019-08-12

## 2019-05-22 RX ORDER — PHENAZOPYRIDINE HYDROCHLORIDE 200 MG/1
200 TABLET, FILM COATED ORAL ONCE
Status: CANCELLED | OUTPATIENT
Start: 2019-05-22 | End: 2019-05-22

## 2019-05-22 NOTE — H&P
Office Visit     5/15/2019  [facility] Matt Barrientos MD       OB/Gyn   Cervical adenocarcinoma (H)       Dx   Pre-Op Exam         Reason for Visit        Progress Notes              SUBJECTIVE:                                                   Jose L Collazo is a 34 year old female who presents to clinic today for the following health issue(s):  Patient presents with:  Pre-Op Exam: 19: Cervical conization, endocervical curettage        HPI: The patient is seen at this time for preoperative clearance for a cervical conization.  The patient had a positive endocervical margin for adenocarcinoma in situ from her colon last year.  She has regular cycles and bleeds 4 days.  She understands the necessity of going back and repeating a cone to get clear margins and clear ECC.        No LMP recorded. Patient is premenopausal..   Patient is sexually active, .  Using condoms for contraception.    reports that she has never smoked. She has never used smokeless tobacco.     STD testing offered?  Declined     Health maintenance updated:  yes     Today's PHQ-2 Score:   PHQ-2 (  [employer]) 5/15/2019   Q1: Little interest or pleasure in doing things 0   Q2: Feeling down, depressed or hopeless 0   PHQ-2 Score 0      Today's PHQ-9 Score:   PHQ-9 SCORE 10/26/2018   PHQ-9 Total Score 0      Today's ANA-7 Score:   ANA-7 SCORE 10/26/2018   Total Score 0         Problem list and histories reviewed & adjusted, as indicated.  Additional history: as documented.         Patient Active Problem List   Diagnosis     High risk HPV infection     Endocervical adenocarcinoma (H)             Past Surgical History:   Procedure Laterality Date     CONIZATION N/A 2019     Procedure: CERVICAL CONIZATION;  Surgeon: Matt Ray MD;  Location:  OR     DILATION AND CURETTAGE N/A 2019     Procedure: ENDOCERVICAL CURETTAGE;  Surgeon: Matt Ray MD;  Location:  OR     HC TOOTH EXTRACTION  LIDIA/LOR   2003     MAMMOPLASTY REDUCTION   05/25/2017       Social History            Tobacco Use     Smoking status: Never Smoker     Smokeless tobacco: Never Used   Substance Use Topics     Alcohol use: No       Alcohol/week: 0.0 oz       Comment: 1-2 glasses of wine per week       Problem (# of Occurrences) Relation (Name,Age of Onset)     Cancer (1) Maternal Grandfather: multiple melanoma ? r/t exposure to Agent Orange     Lung Cancer (1) Maternal Grandmother: non smoker         Negative family history of: Asthma, C.A.D., Diabetes, Hypertension                   Current Outpatient Medications   Medication Sig     methylphenidate (RITALIN) 20 MG tablet       Cholecalciferol (VITAMIN D3 PO) Take by mouth daily     clonazePAM (KLONOPIN) 0.5 MG tablet       MULTI-VITAMIN OR TABS 1 TABLET DAILY prn     Omega-3 Fatty Acids (FISH OIL PO)        No current facility-administered medications for this visit.             Allergies   Allergen Reactions     Diphenhydramine Visual Disturbance       PN: LW Reaction: makes me crazy  Hallucinations, decreased motor control     Seasonal Allergies           ROS:  12 point review of systems negative other than symptoms noted below.     OBJECTIVE:      There were no vitals taken for this visit.  There is no height or weight on file to calculate BMI.     Exam:  Constitutional:  Appearance: Well nourished, well developed alert, in no acute distress  Neck:  Lymph Nodes:  No lymphadenopathy present; Thyroid:  Gland size normal, nontender, no nodules or masses present on palpation  Chest:  Respiratory Effort:  Breathing unlabored  Cardiovascular: Heart: Auscultation:  Regular rate, normal rhythm, no murmurs present  Gastrointestinal:  Abdominal Examination:  Abdomen nontender to palpation, tone normal without rigidity or guarding, no masses present, umbilicus without lesions; Liver/Spleen:  No hepatomegaly present, liver nontender to palpation; Hernias:  No hernias present  Lymphatic:  Lymph Nodes:  No other lymphadenopathy present  Skin:General Inspection:  No rashes present, no lesions present, no areas of discoloration; Genitalia and Groin:  No rashes present, no lesions present, no areas of discoloration, no masses present.  Neurologic/Psychiatric:  Mental Status:  Oriented X3   No Pelvic Exam performed      In-Clinic Test Results:        ASSESSMENT/PLAN:                                                          Patient with abnormal margin from previous cone admitted at this time for repeat conization and endocervical curettage.  The risks and complications have been reviewed and accepted.           Matt Ray MD  [facility] [place]          Instructions      After Visit Summary (Automatic SnapShot taken 5/15/2019)         Additional Documentation     Vitals:    LMP 04/25/2019      Flowsheets:    Ambulatory Assessments         Encounter Info:    Billing Info,      History,      Allergies,      Detailed Report           AVS Reports     Date/Time Report Action User   5/15/2019 11:56 AM After Visit Summary Automatically Generated Matt Ray MD     Encounter Information      Provider Department Encounter # [place]   5/15/2019 11:45 AM Matt Ray MD [facility] [id number] [facility] [place]     Reviewed this Encounter      Medications Problems Allergies History   Matt Ray MD   Reviewed Family, Medical, Surgical, Tobacco   Bharati Cifuentes MA   Reviewed ADL, Alcohol, Drug Use, Family, Medical, Sexual Activity, Surgical, Tobacco       Orders Placed      None       Medication Changes          None        Medication List      Visit Diagnoses         Cervical adenocarcinoma (H)        Problem List

## 2019-05-23 ENCOUNTER — HOSPITAL ENCOUNTER (OUTPATIENT)
Facility: CLINIC | Age: 34
Discharge: HOME | End: 2019-05-23
Attending: OBSTETRICS & GYNECOLOGY | Admitting: OBSTETRICS & GYNECOLOGY
Payer: COMMERCIAL

## 2019-05-23 ENCOUNTER — ANESTHESIA (OUTPATIENT)
Dept: SURGERY | Facility: CLINIC | Age: 34
End: 2019-05-23
Payer: COMMERCIAL

## 2019-05-23 ENCOUNTER — SURGERY (OUTPATIENT)
Age: 34
End: 2019-05-23
Payer: COMMERCIAL

## 2019-05-23 ENCOUNTER — ANESTHESIA EVENT (OUTPATIENT)
Dept: SURGERY | Facility: CLINIC | Age: 34
End: 2019-05-23
Payer: COMMERCIAL

## 2019-05-23 VITALS
WEIGHT: 185.4 LBS | TEMPERATURE: 97.3 F | RESPIRATION RATE: 16 BRPM | HEART RATE: 75 BPM | DIASTOLIC BLOOD PRESSURE: 62 MMHG | OXYGEN SATURATION: 100 % | BODY MASS INDEX: 28.1 KG/M2 | HEIGHT: 68 IN | SYSTOLIC BLOOD PRESSURE: 100 MMHG

## 2019-05-23 DIAGNOSIS — C53.0 ENDOCERVICAL ADENOCARCINOMA (H): Primary | ICD-10-CM

## 2019-05-23 LAB — B-HCG SERPL-ACNC: <1 IU/L (ref 0–5)

## 2019-05-23 PROCEDURE — 25000128 H RX IP 250 OP 636: Performed by: OBSTETRICS & GYNECOLOGY

## 2019-05-23 PROCEDURE — 57522 CONIZATION OF CERVIX: CPT | Performed by: OBSTETRICS & GYNECOLOGY

## 2019-05-23 PROCEDURE — 25000128 H RX IP 250 OP 636: Performed by: ANESTHESIOLOGY

## 2019-05-23 PROCEDURE — 40000170 ZZH STATISTIC PRE-PROCEDURE ASSESSMENT II: Performed by: OBSTETRICS & GYNECOLOGY

## 2019-05-23 PROCEDURE — 25800030 ZZH RX IP 258 OP 636: Performed by: NURSE ANESTHETIST, CERTIFIED REGISTERED

## 2019-05-23 PROCEDURE — 88305 TISSUE EXAM BY PATHOLOGIST: CPT | Mod: 26 | Performed by: OBSTETRICS & GYNECOLOGY

## 2019-05-23 PROCEDURE — 71000027 ZZH RECOVERY PHASE 2 EACH 15 MINS: Performed by: OBSTETRICS & GYNECOLOGY

## 2019-05-23 PROCEDURE — 88305 TISSUE EXAM BY PATHOLOGIST: CPT | Performed by: OBSTETRICS & GYNECOLOGY

## 2019-05-23 PROCEDURE — 84702 CHORIONIC GONADOTROPIN TEST: CPT | Performed by: OBSTETRICS & GYNECOLOGY

## 2019-05-23 PROCEDURE — 37000008 ZZH ANESTHESIA TECHNICAL FEE, 1ST 30 MIN: Performed by: OBSTETRICS & GYNECOLOGY

## 2019-05-23 PROCEDURE — 25000128 H RX IP 250 OP 636: Performed by: NURSE ANESTHETIST, CERTIFIED REGISTERED

## 2019-05-23 PROCEDURE — 36000052 ZZH SURGERY LEVEL 2 EA 15 ADDTL MIN: Performed by: OBSTETRICS & GYNECOLOGY

## 2019-05-23 PROCEDURE — 25000125 ZZHC RX 250: Performed by: OBSTETRICS & GYNECOLOGY

## 2019-05-23 PROCEDURE — 71000012 ZZH RECOVERY PHASE 1 LEVEL 1 FIRST HR: Performed by: OBSTETRICS & GYNECOLOGY

## 2019-05-23 PROCEDURE — 36415 COLL VENOUS BLD VENIPUNCTURE: CPT | Performed by: OBSTETRICS & GYNECOLOGY

## 2019-05-23 PROCEDURE — 37000009 ZZH ANESTHESIA TECHNICAL FEE, EACH ADDTL 15 MIN: Performed by: OBSTETRICS & GYNECOLOGY

## 2019-05-23 PROCEDURE — 36000050 ZZH SURGERY LEVEL 2 1ST 30 MIN: Performed by: OBSTETRICS & GYNECOLOGY

## 2019-05-23 PROCEDURE — 25000566 ZZH SEVOFLURANE, EA 15 MIN: Performed by: OBSTETRICS & GYNECOLOGY

## 2019-05-23 PROCEDURE — 27210794 ZZH OR GENERAL SUPPLY STERILE: Performed by: OBSTETRICS & GYNECOLOGY

## 2019-05-23 PROCEDURE — 25000125 ZZHC RX 250: Performed by: NURSE ANESTHETIST, CERTIFIED REGISTERED

## 2019-05-23 RX ORDER — ONDANSETRON 4 MG/1
4 TABLET, ORALLY DISINTEGRATING ORAL EVERY 30 MIN PRN
Status: DISCONTINUED | OUTPATIENT
Start: 2019-05-23 | End: 2019-05-23 | Stop reason: HOSPADM

## 2019-05-23 RX ORDER — SODIUM CHLORIDE, SODIUM LACTATE, POTASSIUM CHLORIDE, CALCIUM CHLORIDE 600; 310; 30; 20 MG/100ML; MG/100ML; MG/100ML; MG/100ML
INJECTION, SOLUTION INTRAVENOUS CONTINUOUS
Status: DISCONTINUED | OUTPATIENT
Start: 2019-05-23 | End: 2019-05-23 | Stop reason: HOSPADM

## 2019-05-23 RX ORDER — FENTANYL CITRATE 50 UG/ML
INJECTION, SOLUTION INTRAMUSCULAR; INTRAVENOUS PRN
Status: DISCONTINUED | OUTPATIENT
Start: 2019-05-23 | End: 2019-05-23

## 2019-05-23 RX ORDER — LIDOCAINE HYDROCHLORIDE 20 MG/ML
INJECTION, SOLUTION INFILTRATION; PERINEURAL PRN
Status: DISCONTINUED | OUTPATIENT
Start: 2019-05-23 | End: 2019-05-23

## 2019-05-23 RX ORDER — HYDROMORPHONE HYDROCHLORIDE 1 MG/ML
.3-.5 INJECTION, SOLUTION INTRAMUSCULAR; INTRAVENOUS; SUBCUTANEOUS EVERY 10 MIN PRN
Status: DISCONTINUED | OUTPATIENT
Start: 2019-05-23 | End: 2019-05-23 | Stop reason: HOSPADM

## 2019-05-23 RX ORDER — HYDROCODONE BITARTRATE AND ACETAMINOPHEN 5; 325 MG/1; MG/1
1-2 TABLET ORAL EVERY 4 HOURS PRN
Qty: 15 TABLET | Refills: 0 | Status: SHIPPED | OUTPATIENT
Start: 2019-05-23 | End: 2019-06-10

## 2019-05-23 RX ORDER — ONDANSETRON 2 MG/ML
INJECTION INTRAMUSCULAR; INTRAVENOUS PRN
Status: DISCONTINUED | OUTPATIENT
Start: 2019-05-23 | End: 2019-05-23

## 2019-05-23 RX ORDER — MAGNESIUM HYDROXIDE 1200 MG/15ML
LIQUID ORAL PRN
Status: DISCONTINUED | OUTPATIENT
Start: 2019-05-23 | End: 2019-05-23 | Stop reason: HOSPADM

## 2019-05-23 RX ORDER — HYDROCODONE BITARTRATE AND ACETAMINOPHEN 5; 325 MG/1; MG/1
1 TABLET ORAL
Status: DISCONTINUED | OUTPATIENT
Start: 2019-05-23 | End: 2019-05-23 | Stop reason: HOSPADM

## 2019-05-23 RX ORDER — CEFAZOLIN SODIUM 2 G/100ML
2 INJECTION, SOLUTION INTRAVENOUS
Status: COMPLETED | OUTPATIENT
Start: 2019-05-23 | End: 2019-05-23

## 2019-05-23 RX ORDER — PROPOFOL 10 MG/ML
INJECTION, EMULSION INTRAVENOUS PRN
Status: DISCONTINUED | OUTPATIENT
Start: 2019-05-23 | End: 2019-05-23

## 2019-05-23 RX ORDER — CEFAZOLIN SODIUM 1 G/3ML
1 INJECTION, POWDER, FOR SOLUTION INTRAMUSCULAR; INTRAVENOUS SEE ADMIN INSTRUCTIONS
Status: DISCONTINUED | OUTPATIENT
Start: 2019-05-23 | End: 2019-05-23 | Stop reason: HOSPADM

## 2019-05-23 RX ORDER — IODINE AND POTASSIUM IODIDE 50; 100 MG/ML; MG/ML
LIQUID ORAL PRN
Status: DISCONTINUED | OUTPATIENT
Start: 2019-05-23 | End: 2019-05-23 | Stop reason: HOSPADM

## 2019-05-23 RX ORDER — ONDANSETRON 2 MG/ML
4 INJECTION INTRAMUSCULAR; INTRAVENOUS EVERY 30 MIN PRN
Status: DISCONTINUED | OUTPATIENT
Start: 2019-05-23 | End: 2019-05-23 | Stop reason: HOSPADM

## 2019-05-23 RX ORDER — MEPERIDINE HYDROCHLORIDE 25 MG/ML
12.5 INJECTION INTRAMUSCULAR; INTRAVENOUS; SUBCUTANEOUS
Status: DISCONTINUED | OUTPATIENT
Start: 2019-05-23 | End: 2019-05-23 | Stop reason: HOSPADM

## 2019-05-23 RX ORDER — DEXAMETHASONE SODIUM PHOSPHATE 4 MG/ML
INJECTION, SOLUTION INTRA-ARTICULAR; INTRALESIONAL; INTRAMUSCULAR; INTRAVENOUS; SOFT TISSUE PRN
Status: DISCONTINUED | OUTPATIENT
Start: 2019-05-23 | End: 2019-05-23

## 2019-05-23 RX ORDER — PROPOFOL 10 MG/ML
INJECTION, EMULSION INTRAVENOUS CONTINUOUS PRN
Status: DISCONTINUED | OUTPATIENT
Start: 2019-05-23 | End: 2019-05-23

## 2019-05-23 RX ORDER — NALOXONE HYDROCHLORIDE 0.4 MG/ML
.1-.4 INJECTION, SOLUTION INTRAMUSCULAR; INTRAVENOUS; SUBCUTANEOUS
Status: DISCONTINUED | OUTPATIENT
Start: 2019-05-23 | End: 2019-05-23 | Stop reason: HOSPADM

## 2019-05-23 RX ORDER — SODIUM CHLORIDE, SODIUM LACTATE, POTASSIUM CHLORIDE, CALCIUM CHLORIDE 600; 310; 30; 20 MG/100ML; MG/100ML; MG/100ML; MG/100ML
INJECTION, SOLUTION INTRAVENOUS CONTINUOUS PRN
Status: DISCONTINUED | OUTPATIENT
Start: 2019-05-23 | End: 2019-05-23

## 2019-05-23 RX ORDER — KETOROLAC TROMETHAMINE 30 MG/ML
INJECTION, SOLUTION INTRAMUSCULAR; INTRAVENOUS PRN
Status: DISCONTINUED | OUTPATIENT
Start: 2019-05-23 | End: 2019-05-23

## 2019-05-23 RX ORDER — FENTANYL CITRATE 50 UG/ML
25-50 INJECTION, SOLUTION INTRAMUSCULAR; INTRAVENOUS
Status: DISCONTINUED | OUTPATIENT
Start: 2019-05-23 | End: 2019-05-23 | Stop reason: HOSPADM

## 2019-05-23 RX ADMIN — PHENYLEPHRINE HYDROCHLORIDE 100 MCG: 10 INJECTION INTRAVENOUS at 07:40

## 2019-05-23 RX ADMIN — Medication 10 ML: at 07:52

## 2019-05-23 RX ADMIN — FENTANYL CITRATE 50 MCG: 50 INJECTION, SOLUTION INTRAMUSCULAR; INTRAVENOUS at 08:37

## 2019-05-23 RX ADMIN — KETOROLAC TROMETHAMINE 30 MG: 30 INJECTION, SOLUTION INTRAMUSCULAR at 07:55

## 2019-05-23 RX ADMIN — DEXMEDETOMIDINE HYDROCHLORIDE 8 MCG: 100 INJECTION, SOLUTION INTRAVENOUS at 07:25

## 2019-05-23 RX ADMIN — SODIUM CHLORIDE 1000 ML: 900 IRRIGANT IRRIGATION at 07:43

## 2019-05-23 RX ADMIN — SODIUM CHLORIDE, POTASSIUM CHLORIDE, SODIUM LACTATE AND CALCIUM CHLORIDE: 600; 310; 30; 20 INJECTION, SOLUTION INTRAVENOUS at 07:23

## 2019-05-23 RX ADMIN — PROPOFOL 150 MCG/KG/MIN: 10 INJECTION, EMULSION INTRAVENOUS at 07:25

## 2019-05-23 RX ADMIN — PHENYLEPHRINE HYDROCHLORIDE 100 MCG: 10 INJECTION INTRAVENOUS at 07:48

## 2019-05-23 RX ADMIN — FENTANYL CITRATE 25 MCG: 50 INJECTION, SOLUTION INTRAMUSCULAR; INTRAVENOUS at 07:25

## 2019-05-23 RX ADMIN — CEFAZOLIN SODIUM 2 G: 2 INJECTION, SOLUTION INTRAVENOUS at 07:30

## 2019-05-23 RX ADMIN — PROPOFOL 200 MG: 10 INJECTION, EMULSION INTRAVENOUS at 07:25

## 2019-05-23 RX ADMIN — LIDOCAINE HYDROCHLORIDE 50 MG: 20 INJECTION, SOLUTION INFILTRATION; PERINEURAL at 07:25

## 2019-05-23 RX ADMIN — DEXAMETHASONE SODIUM PHOSPHATE 4 MG: 4 INJECTION, SOLUTION INTRA-ARTICULAR; INTRALESIONAL; INTRAMUSCULAR; INTRAVENOUS; SOFT TISSUE at 07:40

## 2019-05-23 RX ADMIN — FENTANYL CITRATE 50 MCG: 50 INJECTION, SOLUTION INTRAMUSCULAR; INTRAVENOUS at 08:31

## 2019-05-23 RX ADMIN — LIDOCAINE HYDROCHLORIDE 14 ML: 10; .005 INJECTION, SOLUTION EPIDURAL; INFILTRATION; INTRACAUDAL; PERINEURAL at 07:52

## 2019-05-23 RX ADMIN — ONDANSETRON 4 MG: 2 INJECTION INTRAMUSCULAR; INTRAVENOUS at 07:40

## 2019-05-23 ASSESSMENT — MIFFLIN-ST. JEOR: SCORE: 1594.47

## 2019-05-23 ASSESSMENT — LIFESTYLE VARIABLES: TOBACCO_USE: 0

## 2019-05-23 NOTE — ANESTHESIA CARE TRANSFER NOTE
Patient: Jose L Collazo    Procedure(s):  CERVICAL CONIZATION  ENDOCERVICAL CURETTAGE    Diagnosis: adnocarcinoma of endo cervix insightu  Diagnosis Additional Information: No value filed.    Anesthesia Type:   General, LMA     Note:  Airway :Face Mask  Patient transferred to:PACU  Comments: Anayeli Report: Identifed the Patient, Identified the Reponsible Provider, Reviewed the pertinent medical history, Discussed the surgical course, Reviewed Intra-OP anesthesia mangement and issues during anesthesia, Set expectations for post-procedure period and Allowed opportunity for questions and acknowledgement of understanding      Vitals: (Last set prior to Anesthesia Care Transfer)    CRNA VITALS  5/23/2019 0732 - 5/23/2019 0808      5/23/2019             NIBP:  103/62    Pulse:  80    NIBP Mean:  80    SpO2:  100 %    Resp Rate (observed):  9    Resp Rate (set):  10                Electronically Signed By: YAMILE Ramos CRNA  May 23, 2019  8:08 AM

## 2019-05-23 NOTE — OR NURSING
PNDS met, po per I&O sheet. Pt Jose L Collazo dressed, up in recliner and transported to Phase 2.
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
## 2019-05-23 NOTE — BRIEF OP NOTE
[facility] Brief Operative Note    Pre-operative diagnosis: adnocarcinoma of endo cervix insitu   Post-operative diagnosis same   Procedure: Procedure(s):  CERVICAL CONIZATION  ENDOCERVICAL CURETTAGE   Surgeon(s): Surgeon(s) and Role:     * Matt Ray MD - Primary   Estimated blood loss: 3 cc    Specimens: ID Type Source Tests Collected by Time Destination   A : CERVICAL CONIZAITON Tissue Cervix SURGICAL PATHOLOGY EXAM Matt Ray MD 5/23/2019  7:48 AM    B : ENDOCERVICAL CURETTINGS Tissue Endocervical SURGICAL PATHOLOGY EXAM Matt Ray MD 5/23/2019  7:50 AM       Findings: See op note

## 2019-05-23 NOTE — ANESTHESIA POSTPROCEDURE EVALUATION
Patient: Jose L Collazo    Procedure(s):  CERVICAL CONIZATION  ENDOCERVICAL CURETTAGE    Diagnosis:adnocarcinoma of endo cervix insightu  Diagnosis Additional Information: No value filed.    Anesthesia Type:  General, LMA    Note:  Anesthesia Post Evaluation    Patient location during evaluation: PACU  Patient participation: Able to fully participate in evaluation  Level of consciousness: awake  Pain management: adequate  Airway patency: patent  Cardiovascular status: acceptable  Respiratory status: acceptable  Hydration status: acceptable  PONV: none     Anesthetic complications: None          Last vitals:  Vitals:    05/23/19 0835 05/23/19 0845 05/23/19 0918   BP:  104/69 100/62   Pulse:  75    Resp: 12 28 16   Temp:  36.3  C (97.3  F)    SpO2: 100% 100% 100%         Electronically Signed By: Renea Short  May 23, 2019  2:08 PM

## 2019-05-23 NOTE — DISCHARGE INSTRUCTIONS
[facility]  Discharge Instructions      Activity  You may resume normal activities including lifting as needed.  It is permissible to climb stairs. You may drive after 24 hours as long as you are not taking narcotic pain pills.  Baths or showers are perfectly acceptable.      Vaginal Discharge  You may have some vaginal bleeding or discharge for about a week after procedure.  You may use tampons or pads.    Temperature  If you develop temperature elevations to over 101  Fahrenheit, your physician should be called immediately.    Diet  [place] or light diet is advisable the day of surgery.  If nausea persists, continue this diet.  If severe, call.    Follow-up  Make an appointment in 1-2 weeks if instructed to at: [phone]        [facility]  [phone]        Same Day Surgery Discharge Instructions for  Sedation and General Anesthesia       It's not unusual to feel dizzy, light-headed or faint for up to 24 hours after surgery or while taking pain medication.  If you have these symptoms: sit for a few minutes before standing and have someone assist you when you get up to walk or use the bathroom.      You should rest and relax for the next 24 hours. We recommend you make arrangements to have an adult stay with you for at least 24 hours after your discharge.  Avoid hazardous and strenuous activity.      DO NOT DRIVE any vehicle or operate mechanical equipment for 24 hours following the end of your surgery.  Even though you may feel normal, your reactions may be affected by the medication you have received.      Do not drink alcoholic beverages for 24 hours following surgery.       Slowly progress to your regular diet as you feel able. It's not unusual to feel nauseated and/or vomit after receiving anesthesia.  If you develop these symptoms, drink clear liquids (apple juice, ginger ale, broth, 7-up, etc. ) until you feel better.  If your nausea and vomiting persists for 24 hours,  please notify your surgeon.        All narcotic pain medications, along with inactivity and anesthesia, can cause constipation. Drinking plenty of liquids and increasing fiber intake will help.      For any questions of a medical nature, call your surgeon.      Do not make important decisions for 24 hours.      If you had general anesthesia, you may have a sore throat for a couple of days related to the breathing tube used during surgery.  You may use Cepacol lozenges to help with this discomfort.  If it worsens or if you develop a fever, contact your surgeon.       If you feel your pain is not well managed with the pain medications prescribed by your surgeon, please contact your surgeon's office to let them know so they can address your concerns.       While you were at the hospital today you received Toradol, an antiinflammatory medication similar to Ibuprofen.  You should not take other antiinflammatory medication, such as Ibuprofen, Motrin, Advil, Aleve, Naprosyn, etc, until 2 PM on 5/23/19.           **If you have questions or concerns about your procedure,   call Dr. Ray at [phone]**

## 2019-05-23 NOTE — OP NOTE
Procedure Date: 2019      PREOPERATIVE DIAGNOSES:  History of adenocarcinoma in situ of the endocervix with positive endocervical margin on previous cone.      POSTOPERATIVE DIAGNOSIS:  SAME     PROCEDURE:  Cervical conization, endocervical curettage.      OPERATIVE PROCEDURE:  After general anesthesia was induced, the patient was placed in the dorsal lithotomy position and prepped and draped in the usual fashion.  The cervix was inspected with Lugol solution and there was no ectocervical abnormality.  Lidocaine with epinephrine was injected circumferentially around the cervix.  A [place] micro tip on the [facility] was used for an excisional cone biopsy with marking with silk suture at 12 o'clock.  An endocervical curettage was performed with tissue submitted separately.  There was no bleeding.  A small pledget of Surgicel gauze was placed in the defect and held in place with a stay suture of 2-0 chromic.  The patient tolerated this well and was given Toradol.  She will be discharged to home to have modified activity for the next 3-4 days.  She will return to the office in 2 weeks for a postoperative check.  We will review her pathology with her when it is available.         ANA MONTENEGRO JR, MD             D: 2019   T: 2019   MT: FRANK      Name:     EDDIE GARCES   MRN:      [id number]        Account:        [id number]   :      1985           Procedure Date: 2019      Document: [id number]

## 2019-05-24 LAB — COPATH REPORT: NORMAL

## 2019-06-10 ENCOUNTER — OFFICE VISIT (OUTPATIENT)
Dept: OBGYN | Facility: CLINIC | Age: 34
End: 2019-06-10
Payer: COMMERCIAL

## 2019-06-10 VITALS
BODY MASS INDEX: 27.89 KG/M2 | HEART RATE: 68 BPM | SYSTOLIC BLOOD PRESSURE: 106 MMHG | DIASTOLIC BLOOD PRESSURE: 64 MMHG | WEIGHT: 184 LBS | HEIGHT: 68 IN

## 2019-06-10 DIAGNOSIS — Z09 POSTOP CHECK: Primary | ICD-10-CM

## 2019-06-10 PROCEDURE — 99024 POSTOP FOLLOW-UP VISIT: CPT | Performed by: OBSTETRICS & GYNECOLOGY

## 2019-06-10 ASSESSMENT — MIFFLIN-ST. JEOR: SCORE: 1588.12

## 2019-06-10 NOTE — PROGRESS NOTES
The patient is seen at this time in follow-up of a repeat cone biopsy for adenocarcinoma in situ.  She has done very well with no problems at this point in time.  She did have a normal menses.  Her biopsy was negative for malignancy as well as her ECC.  She will return in 2 months.  We have asked her to defer fertility for 2 more months.

## 2019-07-10 PROBLEM — C53.0 ENDOCERVICAL ADENOCARCINOMA (H): Status: ACTIVE | Noted: 2018-11-26

## 2019-08-12 ENCOUNTER — OFFICE VISIT (OUTPATIENT)
Dept: OBGYN | Facility: CLINIC | Age: 34
End: 2019-08-12
Payer: COMMERCIAL

## 2019-08-12 VITALS
HEIGHT: 68 IN | WEIGHT: 202.4 LBS | SYSTOLIC BLOOD PRESSURE: 102 MMHG | BODY MASS INDEX: 30.68 KG/M2 | DIASTOLIC BLOOD PRESSURE: 70 MMHG

## 2019-08-12 DIAGNOSIS — D06.9 ADENOCARCINOMA IN SITU OF CERVIX: Primary | ICD-10-CM

## 2019-08-12 PROCEDURE — 99024 POSTOP FOLLOW-UP VISIT: CPT | Performed by: OBSTETRICS & GYNECOLOGY

## 2019-08-12 ASSESSMENT — MIFFLIN-ST. JEOR: SCORE: 1671.58

## 2019-08-12 NOTE — PROGRESS NOTES
SUBJECTIVE:                                                   Jose L Collazo is a 34 year old female who presents to clinic today for the following health issue(s):  Patient presents with:  Surgical Followup: patient had a cone biopsy done on 19    HPI: The patient is seen at this time in follow-up of a extensive repeat cone for pre-invasive endocervical issues.  She has healed very well and has had no abnormal discharge.  She has regular cycles.      Patient's last menstrual period was 2019 (approximate)..   Patient is sexually active, .  Using condoms for contraception.    reports that she has never smoked. She has never used smokeless tobacco.    STD testing offered?  Declined    Health maintenance updated:  yes    Today's PHQ-2 Score:   PHQ-2 (  [employer]) 5/15/2019   Q1: Little interest or pleasure in doing things 0   Q2: Feeling down, depressed or hopeless 0   PHQ-2 Score 0     Today's PHQ-9 Score:   PHQ-9 SCORE 10/26/2018   PHQ-9 Total Score 0     Today's ANA-7 Score:   ANA-7 SCORE 10/26/2018   Total Score 0       Problem list and histories reviewed & adjusted, as indicated.  Additional history: as documented.    Patient Active Problem List   Diagnosis     High risk HPV infection     Endocervical adenocarcinoma (H)     Past Surgical History:   Procedure Laterality Date     CONIZATION N/A 2019    Procedure: CERVICAL CONIZATION;  Surgeon: Matt Ray MD;  Location:  OR     CONIZATION N/A 2019    Procedure: CERVICAL CONIZATION;  Surgeon: Matt Ray MD;  Location:  OR     DILATION AND CURETTAGE N/A 2019    Procedure: ENDOCERVICAL CURETTAGE;  Surgeon: Matt Ray MD;  Location:  OR     DILATION AND CURETTAGE N/A 2019    Procedure: ENDOCERVICAL CURETTAGE;  Surgeon: Matt Ray MD;  Location:  OR     HC TOOTH EXTRACTION W/FORCEP       MAMMOPLASTY REDUCTION  2017      Social History     Tobacco Use     Smoking status: Never  "Smoker     Smokeless tobacco: Never Used   Substance Use Topics     Alcohol use: No     Alcohol/week: 0.0 oz     Comment: 1-2 glasses of wine per week      Problem (# of Occurrences) Relation (Name,Age of Onset)    Cancer (1) Maternal Grandfather: multiple melanoma ? r/t exposure to Agent Orange    Lung Cancer (1) Maternal Grandmother: non smoker       Negative family history of: Asthma, C.A.D., Diabetes, Hypertension            Current Outpatient Medications   Medication Sig     Cholecalciferol (VITAMIN D3 PO) Take by mouth daily     clonazePAM (KLONOPIN) 0.5 MG tablet      MULTI-VITAMIN OR TABS 1 TABLET DAILY prn     Omega-3 Fatty Acids (FISH OIL PO)      No current facility-administered medications for this visit.      Allergies   Allergen Reactions     Diphenhydramine Visual Disturbance     PN: LW Reaction: makes me crazy  Hallucinations, decreased motor control     Seasonal Allergies        ROS:  12 point review of systems negative other than symptoms noted below.    OBJECTIVE:     /70   Ht 1.727 m (5' 8\")   Wt 91.8 kg (202 lb 6.4 oz)   LMP 08/02/2019 (Approximate)   BMI 30.77 kg/m    Body mass index is 30.77 kg/m .    Exam:  Constitutional:  Appearance: Well nourished, well developed alert, in no acute distress  Gastrointestinal:  Abdominal Examination:  Abdomen nontender to palpation, tone normal without rigidity or guarding, no masses present, umbilicus without lesions; Liver/Spleen:  No hepatomegaly present, liver nontender to palpation; Hernias:  No hernias present  Lymphatic: Lymph Nodes:  No other lymphadenopathy present  Skin:General Inspection:  No rashes present, no lesions present, no areas of discoloration; Genitalia and Groin:  No rashes present, no lesions present, no areas of discoloration, no masses present.  Neurologic/Psychiatric:  Mental Status:  Oriented X3   Pelvic Exam:  External Genitalia:     Normal appearance for age, no discharge present, no tenderness present, no " inflammatory lesions present, color normal  Vagina:     Normal vaginal vault without central or paravaginal defects, no discharge present, no inflammatory lesions present, no masses present  Bladder:     Nontender to palpation  Urethra:   Urethral Body:  Urethra palpation normal, urethra structural support normal   Urethral Meatus:  No erythema or lesions present  Cervix:     Appearance healthy, no lesions present, nontender to palpation, no bleeding present  Uterus:     Uterus: firm, normal sized and nontender, midplane in position.   Adnexa:     No adnexal tenderness present, no adnexal masses present  Perineum:     Perineum within normal limits, no evidence of trauma, no rashes or skin lesions present  Anus:     Anus within normal limits, no hemorrhoids present  Inguinal Lymph Nodes:     No lymphadenopathy present  Pubic Hair:     Normal pubic hair distribution for age  Genitalia and Groin:     No rashes present, no lesions present, no areas of discoloration, no masses present       In-Clinic Test Results:      ASSESSMENT/PLAN:                                                        Excellent second postoperative check after a repeat conization for adenocarcinoma in situ of the endocervix.  We will repeat a Pap smear in October.  She will then be free to think about pregnancy if she is clear.  Her suction cone was negative for any invasive disease.          Matt Ray MD  [facility] [place]

## 2019-10-11 NOTE — PROGRESS NOTES
SUBJECTIVE:                                                   Jose L Collazo is a 34 year old female who presents to clinic today for the following health issue(s):  No chief complaint on file.      HPI: The patient is seen at this time for follow-up Pap smear.  She had a large cervical conization for clear margins for adenocarcinoma in situ of the cervix.  She has had normal cycles with normal amount of bleeding.  She has had no intermenstrual bleeding or spotting.      No LMP recorded..     Patient is sexually active, .  Using condoms for contraception.    reports that she has never smoked. She has never used smokeless tobacco.    STD testing offered?  Declined    Health maintenance updated:  yes    Today's PHQ-2 Score:   PHQ-2 (  [employer]) 5/15/2019   Q1: Little interest or pleasure in doing things 0   Q2: Feeling down, depressed or hopeless 0   PHQ-2 Score 0     Today's PHQ-9 Score:   PHQ-9 SCORE 10/26/2018   PHQ-9 Total Score 0     Today's ANA-7 Score:   ANA-7 SCORE 10/26/2018   Total Score 0       Problem list and histories reviewed & adjusted, as indicated.  Additional history: as documented.    Patient Active Problem List   Diagnosis     High risk HPV infection     Endocervical adenocarcinoma (H)     Past Surgical History:   Procedure Laterality Date     CONIZATION N/A 2019    Procedure: CERVICAL CONIZATION;  Surgeon: Matt Ray MD;  Location:  OR     CONIZATION N/A 2019    Procedure: CERVICAL CONIZATION;  Surgeon: Matt Ray MD;  Location:  OR     DILATION AND CURETTAGE N/A 2019    Procedure: ENDOCERVICAL CURETTAGE;  Surgeon: Matt Ray MD;  Location:  OR     DILATION AND CURETTAGE N/A 2019    Procedure: ENDOCERVICAL CURETTAGE;  Surgeon: Matt Ray MD;  Location:  OR     HC TOOTH EXTRACTION W/FORCEP       MAMMOPLASTY REDUCTION  2017      Social History     Tobacco Use     Smoking status: Never Smoker     Smokeless  tobacco: Never Used   Substance Use Topics     Alcohol use: No     Alcohol/week: 0.0 standard drinks     Comment: 1-2 glasses of wine per week      Problem (# of Occurrences) Relation (Name,Age of Onset)    Cancer (1) Maternal Grandfather: multiple melanoma ? r/t exposure to Agent Orange    Lung Cancer (1) Maternal Grandmother: non smoker       Negative family history of: Asthma, C.A.D., Diabetes, Hypertension            Current Outpatient Medications   Medication Sig     Cholecalciferol (VITAMIN D3 PO) Take by mouth daily     clonazePAM (KLONOPIN) 0.5 MG tablet      MULTI-VITAMIN OR TABS 1 TABLET DAILY prn     Omega-3 Fatty Acids (FISH OIL PO)      No current facility-administered medications for this visit.      Allergies   Allergen Reactions     Diphenhydramine Visual Disturbance     PN: LW Reaction: makes me crazy  Hallucinations, decreased motor control     Seasonal Allergies        ROS:  12 point review of systems negative other than symptoms noted below.    OBJECTIVE:     There were no vitals taken for this visit.  There is no height or weight on file to calculate BMI.    Exam:  Constitutional:  Appearance: Well nourished, well developed alert, in no acute distress  Gastrointestinal:  Abdominal Examination:  Abdomen nontender to palpation, tone normal without rigidity or guarding, no masses present, umbilicus without lesions; Liver/Spleen:  No hepatomegaly present, liver nontender to palpation; Hernias:  No hernias present  Lymphatic: Lymph Nodes:  No other lymphadenopathy present  Skin: General Inspection:  No rashes present, no lesions present, no areas of discoloration.  Neurologic:  Mental Status:  Oriented X3.  Normal strength and tone, sensory exam grossly normal, mentation intact and speech normal.    Psychiatric:  Mentation appears normal and affect normal/bright.  Pelvic Exam:  External Genitalia:     Normal appearance for age, no discharge present, no tenderness present, no inflammatory lesions  present, color normal  Vagina:     Normal vaginal vault without central or paravaginal defects, no discharge present, no inflammatory lesions present, no masses present  Bladder:     Nontender to palpation  Urethra:   Urethral Body:  Urethra palpation normal, urethra structural support normal   Urethral Meatus:  No erythema or lesions present  Cervix:     Appearance healthy, no lesions present, nontender to palpation, no bleeding present  Uterus:     Uterus: firm, normal sized and nontender, midplane in position.   Adnexa:     No adnexal tenderness present, no adnexal masses present  Perineum:     Perineum within normal limits, no evidence of trauma, no rashes or skin lesions present  Anus:     Anus within normal limits, no hemorrhoids present  Inguinal Lymph Nodes:     No lymphadenopathy present  Pubic Hair:     Normal pubic hair distribution for age  Genitalia and Groin:     No rashes present, no lesions present, no areas of discoloration, no masses present       In-Clinic Test Results:      ASSESSMENT/PLAN:                                                        34-year-old patient with history of adenocarcinoma in situ of the cervix.  She had clear margins on her last cone.  She is here for repeat Pap smear at this time.  If this is normal she can go 6 more months and start attempting pregnancy when appropriate.          aMtt Ray MD  [facility] [place]

## 2019-10-16 ENCOUNTER — OFFICE VISIT (OUTPATIENT)
Dept: OBGYN | Facility: CLINIC | Age: 34
End: 2019-10-16
Payer: COMMERCIAL

## 2019-10-16 VITALS
SYSTOLIC BLOOD PRESSURE: 100 MMHG | DIASTOLIC BLOOD PRESSURE: 60 MMHG | HEIGHT: 68 IN | BODY MASS INDEX: 30.89 KG/M2 | WEIGHT: 203.8 LBS

## 2019-10-16 DIAGNOSIS — Z85.41 HX OF CERVICAL CANCER: Primary | ICD-10-CM

## 2019-10-16 PROCEDURE — 87624 HPV HI-RISK TYP POOLED RSLT: CPT | Performed by: OBSTETRICS & GYNECOLOGY

## 2019-10-16 PROCEDURE — 99212 OFFICE O/P EST SF 10 MIN: CPT | Performed by: OBSTETRICS & GYNECOLOGY

## 2019-10-16 PROCEDURE — G0145 SCR C/V CYTO,THINLAYER,RESCR: HCPCS | Performed by: OBSTETRICS & GYNECOLOGY

## 2019-10-16 PROCEDURE — G0476 HPV COMBO ASSAY CA SCREEN: HCPCS | Performed by: OBSTETRICS & GYNECOLOGY

## 2019-10-16 ASSESSMENT — MIFFLIN-ST. JEOR: SCORE: 1677.93

## 2019-10-16 NOTE — LETTER
October 24, 2019    Jose L Warren Vale  [address] [facility] [address] [place] [id number]    Dear ,  This letter is regarding your recent Pap smear (cervical cancer screening) and Human Papillomavirus (HPV) test.  We are happy to inform you that your Pap smear result is normal. Cervical cancer is closely linked with certain types of HPV. Your results showed no evidence of high-risk HPV.  We recommend you have your next PAP smear and HPV test in 6 months.  You will still need to return to the clinic every year for an annual exam and other preventive tests.  If you have additional questions regarding this result, please call our registered nurse, Delilah at [phone].  Sincerely,    Matt Ray MD/lizzie

## 2019-10-21 LAB
COPATH REPORT: NORMAL
PAP: NORMAL

## 2019-10-22 LAB
FINAL DIAGNOSIS: NORMAL
HPV HR 12 DNA CVX QL NAA+PROBE: NEGATIVE
HPV16 DNA SPEC QL NAA+PROBE: NEGATIVE
HPV18 DNA SPEC QL NAA+PROBE: NEGATIVE
SPECIMEN DESCRIPTION: NORMAL
SPECIMEN SOURCE CVX/VAG CYTO: NORMAL

## 2020-04-15 NOTE — PROGRESS NOTES
SUBJECTIVE:                                                   Jose L Collazo is a 35 year old female who presents to clinic today for the following health issue(s):  Patient presents with:  Gyn Exam: 6 month f/u pap and HPV      HPI: Patient is seen at this time for follow-up of high risk HPV status.  She has had a cervical conization 1 year ago.  Her pathology showed adenocarcinoma in situ.  Her cycles are unremarkable.  She is working at this time at home during the pandemic.      Patient's last menstrual period was 2020 (approximate)..     Patient is sexually active, .  Using none for contraception.    reports that she has never smoked. She has never used smokeless tobacco.    STD testing offered?  Declined    Health maintenance updated:  yes    Problem list and histories reviewed & adjusted, as indicated.  Additional history: as documented.    Patient Active Problem List   Diagnosis     High risk HPV infection     Endocervical adenocarcinoma (H)     Past Surgical History:   Procedure Laterality Date     CONIZATION N/A 2019    Procedure: CERVICAL CONIZATION;  Surgeon: Matt Ray MD;  Location:  OR     CONIZATION N/A 2019    Procedure: CERVICAL CONIZATION;  Surgeon: Matt Ray MD;  Location:  OR     DILATION AND CURETTAGE N/A 2019    Procedure: ENDOCERVICAL CURETTAGE;  Surgeon: Matt Ray MD;  Location:  OR     DILATION AND CURETTAGE N/A 2019    Procedure: ENDOCERVICAL CURETTAGE;  Surgeon: Matt Ray MD;  Location:  OR      TOOTH EXTRACTION W/FORCEP       MAMMOPLASTY REDUCTION  2017      Social History     Tobacco Use     Smoking status: Never Smoker     Smokeless tobacco: Never Used   Substance Use Topics     Alcohol use: No     Alcohol/week: 0.0 standard drinks     Comment: 1-2 glasses of wine per week      Problem (# of Occurrences) Relation (Name,Age of Onset)    Cancer (1) Maternal Grandfather: multiple melanoma ?  "r/t exposure to Agent Orange    Lung Cancer (1) Maternal Grandmother: non smoker    No Known Problems (7) Mother, Father, Paternal Grandmother, Paternal Grandfather, Sister, Brother, Other       Negative family history of: Asthma, C.A.D., Diabetes, Hypertension            Current Outpatient Medications   Medication Sig     Cholecalciferol (VITAMIN D3 PO) Take by mouth daily     methylphenidate (RITALIN) 10 MG tablet 3 times daily      MULTI-VITAMIN OR TABS 1 TABLET DAILY prn     Omega-3 Fatty Acids (FISH OIL PO)      clonazePAM (KLONOPIN) 0.5 MG tablet      No current facility-administered medications for this visit.      Allergies   Allergen Reactions     Diphenhydramine Visual Disturbance     PN: LW Reaction: makes me crazy  Hallucinations, decreased motor control     Seasonal Allergies        ROS:  12 point review of systems negative other than symptoms noted below or in the HPI.  No urinary frequency or dysuria, bladder or kidney problems      OBJECTIVE:     /68   Pulse 74   Ht 1.727 m (5' 8\")   Wt 95.1 kg (209 lb 9.6 oz)   LMP 04/11/2020 (Approximate)   BMI 31.87 kg/m    Body mass index is 31.87 kg/m .    Exam:  Gastrointestinal:  Abdominal Examination:  Abdomen nontender to palpation, tone normal without rigidity or guarding, no masses present, umbilicus without lesions; Liver/Spleen:  No hepatomegaly present, liver nontender to palpation; Hernias:  No hernias present  Lymphatic: Lymph Nodes:  No other lymphadenopathy present  Skin: General Inspection:  No rashes present, no lesions present, no areas of discoloration.  Neurologic:  Mental Status:  Oriented X3.  Normal strength and tone, sensory exam grossly normal, mentation intact and speech normal.    Psychiatric:  Mentation appears normal and affect normal/bright.  Pelvic Exam:  External Genitalia:     Normal appearance for age, no discharge present, no tenderness present, no inflammatory lesions present, color normal  Vagina:     Normal vaginal " vault without central or paravaginal defects, no discharge present, no inflammatory lesions present, no masses present  Bladder:     Nontender to palpation  Urethra:   Urethral Body:  Urethra palpation normal, urethra structural support normal   Urethral Meatus:  No erythema or lesions present  Cervix:     Appearance healthy, no lesions present, nontender to palpation, no bleeding present  Uterus:     Uterus: firm, normal sized and nontender, midplane in position.   Adnexa:     No adnexal tenderness present, no adnexal masses present  Perineum:     Perineum within normal limits, no evidence of trauma, no rashes or skin lesions present  Anus:     Anus within normal limits, no hemorrhoids present  Inguinal Lymph Nodes:     No lymphadenopathy present  Pubic Hair:     Normal pubic hair distribution for age  Genitalia and Groin:     No rashes present, no lesions present, no areas of discoloration, no masses present       In-Clinic Test Results:      ASSESSMENT/PLAN:                                                      35-year-old patient with history of cervical dysplasia and cervical conization.  We will contact her with Pap results.          Matt Ray MD  [facility] [place]

## 2020-04-28 ENCOUNTER — OFFICE VISIT (OUTPATIENT)
Dept: OBGYN | Facility: CLINIC | Age: 35
End: 2020-04-28
Payer: COMMERCIAL

## 2020-04-28 VITALS
HEIGHT: 68 IN | WEIGHT: 209.6 LBS | BODY MASS INDEX: 31.77 KG/M2 | SYSTOLIC BLOOD PRESSURE: 104 MMHG | DIASTOLIC BLOOD PRESSURE: 68 MMHG | HEART RATE: 74 BPM

## 2020-04-28 DIAGNOSIS — D06.9 ADENOCARCINOMA IN SITU OF CERVIX: Primary | ICD-10-CM

## 2020-04-28 PROCEDURE — 87624 HPV HI-RISK TYP POOLED RSLT: CPT | Performed by: OBSTETRICS & GYNECOLOGY

## 2020-04-28 PROCEDURE — G0145 SCR C/V CYTO,THINLAYER,RESCR: HCPCS | Performed by: OBSTETRICS & GYNECOLOGY

## 2020-04-28 PROCEDURE — 99212 OFFICE O/P EST SF 10 MIN: CPT | Performed by: OBSTETRICS & GYNECOLOGY

## 2020-04-28 RX ORDER — METHYLPHENIDATE HYDROCHLORIDE 10 MG/1
TABLET ORAL 3 TIMES DAILY
COMMUNITY
Start: 2020-04-21 | End: 2021-12-15

## 2020-04-28 ASSESSMENT — MIFFLIN-ST. JEOR: SCORE: 1699.24

## 2020-04-28 NOTE — LETTER
May 11, 2020    Jose L Warren Vale  [address] [facility] [address]    Dear ,  This letter is regarding your recent Pap smear (cervical cancer screening) and Human Papillomavirus (HPV) test.  We are happy to inform you that your Pap smear result is normal. Cervical cancer is closely linked with certain types of HPV. Your results showed no evidence of high-risk HPV.  We recommend you have your next PAP smear and HPV test in 6 months.  You will still need to return to the clinic every year for an annual exam and other preventive tests.   If you have additional questions regarding this result, please call our registered nurse, Mony at [phone].  Sincerely,    Matt Ray MD/marcia

## 2020-04-28 NOTE — LETTER
May 11, 2020      Eddie Garces  [address] [facility] [address]        Dear ,    We are writing to inform you of your test results.    {results letter list:[id number]}    Resulted Orders   Pap imaged thin layer screen with HPV - recommended age 30 - 65 years (select HPV order below)   Result Value Ref Range    PAP NIL     Copath Report         Patient Name: EDDIE GARCES  MR#: [id number]  Specimen #: [id number]  Collected: 4/28/2020  Received: 5/1/2020  Reported: 5/4/2020 14:32  Ordering Phy(s): ANA MONTENEGRO    For improved result formatting, select 'View Enhanced Report Format' under   Linked Documents section.    SPECIMEN/STAIN PROCESS:  Pap imaged thin layer prep screening (Surepath, FocalPoint with guided   screening)       Pap-Cyto x 1, HPV ordered x 1    SOURCE: Cervical, endocervical  ----------------------------------------------------------------   Pap imaged thin layer prep screening (Surepath, FocalPoint with guided   screening)  SPECIMEN ADEQUACY:  Satisfactory for evaluation.  -Transformation zone component absent.    CYTOLOGIC INTERPRETATION:    Negative for intraepithelial lesion or malignancy    Electronically signed out by:  ARNOLD oGnzalez (ASCP)    CLINICAL HISTORY:    A previous normal pap  Date of Last Pap: 10/16/19,    Papanicolaou Test Limitations:  Cervical cytology is a screening t est with   limited sensitivity; regular  screening is critical for cancer prevention; Pap tests are primarily   effective for the diagnosis/prevention of  squamous cell carcinoma, not adenocarcinomas or other cancers.    COLLECTION SITE:  Client:  [facility]  Location: [facility] (S)    The technical component of this testing was completed at the [facility], with the professional component performed   at the [facility], [address] [place] [address]  [place] [address] ([phone])       HPV High Risk Types DNA Cervical   Result Value Ref Range    HPV Source SurePath     HPV 16 DNA Negative NEG^Negative    HPV 18 DNA Negative NEG^Negative    Other HR HPV Negative NEG^Negative    Final Diagnosis This patient's sample is negative for HPV DNA.       Comment:      This test was developed and its performance characteristics determined by the   [facility], [facility]. It   has not been cleared or approved by the FDA. The laboratory is regulated under   CLIA as qualified to perform high-complexity testing. This test is used for   clinical purposes. It should not be regarded as investigational or for   research.  (Note)  METHODOLOGY:  The Roche antolin 4800 system uses automated extraction,   simultaneous amplification of HPV (L1 region) and beta-globin,    followed by  real time detection of fluorescent labeled HPV and beta   globin using specific oligonucleotide probes . The test specifically   identifies types HPV 16 DNA and HPV 18 DNA while concurrently   detecting the rest of the high risk types (31, 33, 35, 39, 45, 51,   52, 56, 58, 59, 66 or 68).  COMMENTS:  This test is not intended for use as a screening device   for women under age 30 with normal cervical cytology.  Results should   be correl  ated with cytologic and histologic findings. Close clinical   followup is recommended.      Specimen Description Cervical Cells       Comment:      [id number]       If you have any questions or concerns, please call the clinic at the number listed above.       Sincerely,        Matt Ray MD

## 2020-05-04 LAB
COPATH REPORT: NORMAL
PAP: NORMAL

## 2020-10-14 ENCOUNTER — PATIENT OUTREACH (OUTPATIENT)
Dept: OBGYN | Facility: CLINIC | Age: 35
End: 2020-10-14

## 2020-10-14 DIAGNOSIS — R87.810 CERVICAL HIGH RISK HPV (HUMAN PAPILLOMAVIRUS) TEST POSITIVE: ICD-10-CM

## 2020-10-14 NOTE — LETTER
October 14, 2020      Jose L Collazo  [address] [facility] [address]        Dear ,    This letter is to remind you that you are due for your follow-up Pap smear and Human Papillomavirus (HPV) test.    Please call [phone] to schedule your appointment at your earliest convenience.    If you have completed the appointment outside of the [facility] system, please have the records forwarded to our office. We will update your chart for your provider to review before your next annual wellness visit.     Thank you for choosing [facility]!      Sincerely,    Your [facility] Care Team

## 2020-11-25 ENCOUNTER — OFFICE VISIT (OUTPATIENT)
Dept: OBGYN | Facility: CLINIC | Age: 35
End: 2020-11-25
Payer: COMMERCIAL

## 2020-11-25 VITALS
DIASTOLIC BLOOD PRESSURE: 62 MMHG | SYSTOLIC BLOOD PRESSURE: 128 MMHG | BODY MASS INDEX: 31.61 KG/M2 | HEIGHT: 68 IN | WEIGHT: 208.6 LBS | HEART RATE: 88 BPM

## 2020-11-25 DIAGNOSIS — Z01.419 ENCOUNTER FOR GYNECOLOGICAL EXAMINATION WITHOUT ABNORMAL FINDING: Primary | ICD-10-CM

## 2020-11-25 PROCEDURE — 99395 PREV VISIT EST AGE 18-39: CPT | Performed by: OBSTETRICS & GYNECOLOGY

## 2020-11-25 PROCEDURE — 88175 CYTOPATH C/V AUTO FLUID REDO: CPT | Performed by: OBSTETRICS & GYNECOLOGY

## 2020-11-25 PROCEDURE — 87624 HPV HI-RISK TYP POOLED RSLT: CPT | Performed by: OBSTETRICS & GYNECOLOGY

## 2020-11-25 ASSESSMENT — ANXIETY QUESTIONNAIRES
7. FEELING AFRAID AS IF SOMETHING AWFUL MIGHT HAPPEN: SEVERAL DAYS
5. BEING SO RESTLESS THAT IT IS HARD TO SIT STILL: NOT AT ALL
1. FEELING NERVOUS, ANXIOUS, OR ON EDGE: SEVERAL DAYS
6. BECOMING EASILY ANNOYED OR IRRITABLE: NOT AT ALL
3. WORRYING TOO MUCH ABOUT DIFFERENT THINGS: SEVERAL DAYS
2. NOT BEING ABLE TO STOP OR CONTROL WORRYING: NOT AT ALL
GAD7 TOTAL SCORE: 3
IF YOU CHECKED OFF ANY PROBLEMS ON THIS QUESTIONNAIRE, HOW DIFFICULT HAVE THESE PROBLEMS MADE IT FOR YOU TO DO YOUR WORK, TAKE CARE OF THINGS AT HOME, OR GET ALONG WITH OTHER PEOPLE: NOT DIFFICULT AT ALL

## 2020-11-25 ASSESSMENT — MIFFLIN-ST. JEOR: SCORE: 1690.73

## 2020-11-25 ASSESSMENT — PATIENT HEALTH QUESTIONNAIRE - PHQ9
5. POOR APPETITE OR OVEREATING: NOT AT ALL
SUM OF ALL RESPONSES TO PHQ QUESTIONS 1-9: 2

## 2020-11-25 NOTE — LETTER
December 8, 2020      Jose L Collazo  [address] [facility] [address]        Dear ,    We are happy to inform you that your recent Pap smear and Human Papillomavirus (HPV) test results are normal and negative.    It is recommended that you have your next Pap smear in 1 year. You will also need to return to the clinic every year for an annual wellness visit.    If you have additional questions regarding this result, please contact our office and we will be happy to assist you.      Sincerely,    Your [facility] Care Team

## 2020-11-25 NOTE — PROGRESS NOTES
Jose L is a 35 year old  female who presents for annual exam.     Besides routine health maintenance,  she would like to discuss fertility.    HPI: The patient is seen at this time for annual exam.  She is menstruating on a regular basis but did not has not done any ovulation testing they are attempting pregnancy.  There is no sperm count available.  Her overall health is good.  The patient's PCP is Matt Ray MD.       GYNECOLOGIC HISTORY:    Patient's last menstrual period was 2020.    Regular menses? yes  Menses every 35 days.  Length of menses: 3 days    Her current contraception method is: none.  She  reports that she has never smoked. She has never used smokeless tobacco.    Patient is sexually active.  Last PHQ-9 score on record =   PHQ-9 SCORE 10/26/2018   PHQ-9 Total Score 0     Last GAD7 score on record =   ANA-7 SCORE 10/26/2018   Total Score 0     Alcohol Score = 1    HEALTH MAINTENANCE:  Cholesterol:   Recent Labs   Lab Test 18  1210 17  0911   CHOL 178 165   HDL 56 68   * 92   TRIG 60 26     Last Mammo: Not applicable, Result: Not applicable, Next Mammo: Due at age 41  Pap:   Lab Results   Component Value Date    PAP NIL, HPV- 2020    PAP NIL 10/16/2019    PAP AIS 2018     Colonoscopy:  N/a, Result: Not applicable, Next Colonoscopy: Age 51.  Dexa:  N/a    Health maintenance updated:  yes    HISTORY:  OB History    Para Term  AB Living   2 0 0 0 2 0   SAB TAB Ectopic Multiple Live Births   2 0 0 0 0      # Outcome Date GA Lbr [place]/2nd Weight Sex Delivery Anes PTL Lv   2 SAB            1 SAB                Patient Active Problem List   Diagnosis     Cervical high risk HPV (human papillomavirus) test positive     Endocervical adenocarcinoma (H)     Past Surgical History:   Procedure Laterality Date     CONIZATION N/A 2019    Procedure: CERVICAL CONIZATION;  Surgeon: Matt Ray MD;  Location: [facility] OR     CONIZATION N/A 2019  "   Procedure: CERVICAL CONIZATION;  Surgeon: Matt Ray MD;  Location:  OR     DILATION AND CURETTAGE N/A 1/8/2019    Procedure: ENDOCERVICAL CURETTAGE;  Surgeon: Matt Ray MD;  Location:  OR     DILATION AND CURETTAGE N/A 5/23/2019    Procedure: ENDOCERVICAL CURETTAGE;  Surgeon: Matt Ray MD;  Location:  OR     HC TOOTH EXTRACTION W/FORCEP  2003     MAMMOPLASTY REDUCTION  05/25/2017      Social History     Tobacco Use     Smoking status: Never Smoker     Smokeless tobacco: Never Used   Substance Use Topics     Alcohol use: No     Alcohol/week: 0.0 standard drinks     Comment: 1-2 glasses of wine per week      Problem (# of Occurrences) Relation (Name,Age of Onset)    Cancer (1) Maternal Grandfather: multiple melanoma ? r/t exposure to Agent Orange    Lung Cancer (1) Maternal Grandmother: non smoker    No Known Problems (7) Mother, Father, Paternal Grandmother, Paternal Grandfather, Sister, Brother, Other       Negative family history of: Asthma, C.A.D., Diabetes, Hypertension            Current Outpatient Medications   Medication Sig     methylphenidate (RITALIN) 10 MG tablet 3 times daily      Prenatal Vit-Fe Fumarate-FA (PRENATAL MULTIVITAMIN  PLUS IRON) 27-1 MG TABS Take by mouth daily     clonazePAM (KLONOPIN) 0.5 MG tablet      No current facility-administered medications for this visit.      Allergies   Allergen Reactions     Diphenhydramine Visual Disturbance     PN: LW Reaction: makes me crazy  Hallucinations, decreased motor control     Seasonal Allergies        Past medical, surgical, social and family histories were reviewed and updated in EPIC.    ROS:   12 point review of systems negative other than symptoms noted below or in the HPI.  No urinary frequency or dysuria, bladder or kidney problems    EXAM:  /62   Pulse 88   Ht 1.721 m (5' 7.75\")   Wt 94.6 kg (208 lb 9.6 oz)   LMP 11/08/2020   Breastfeeding No   BMI 31.95 kg/m     BMI: Body mass index is 31.95 " kg/m .    PHYSICAL EXAM:  Constitutional:   Appearance: Well nourished, well developed, alert, in no acute distress  Neck:  Lymph Nodes:  No lymphadenopathy present    Thyroid:  Gland size normal, nontender, no nodules or masses present  on palpation  Chest:  Respiratory Effort:  Breathing unlabored  Cardiovascular:    Heart: Auscultation:  Regular rate, normal rhythm, no murmurs present  Breasts: Inspection of Breasts:  No lymphadenopathy present., Palpation of Breasts and Axillae:  No masses present on palpation, no breast tenderness., Axillary Lymph Nodes:  No lymphadenopathy present. and No nodularity, asymmetry or nipple discharge bilaterally.  Bilateral reduction scars are prominent  Gastrointestinal:   Abdominal Examination:  Abdomen nontender to palpation, tone normal without rigidity or guarding, no masses present, umbilicus without lesions   Liver and Spleen:  No hepatomegaly present, liver nontender to palpation    Hernias:  No hernias present  Lymphatic: Lymph Nodes:  No other lymphadenopathy present  Skin:  General Inspection:  No rashes present, no lesions present, no areas of  discoloration  Neurologic:    Mental Status:  Oriented X3.  Normal strength and tone, sensory exam                grossly normal, mentation intact and speech normal.    Psychiatric:   Mentation appears normal and affect normal/bright.         Pelvic Exam:  External Genitalia:     Normal appearance for age, no discharge present, no tenderness present, no inflammatory lesions present, color normal  Vagina:     Normal vaginal vault without central or paravaginal defects, no discharge present, no inflammatory lesions present, no masses present  Bladder:     Nontender to palpation  Urethra:   Urethral Body:  Urethra palpation normal, urethra structural support normal   Urethral Meatus:  No erythema or lesions present  Cervix:     Appearance healthy, no lesions present, nontender to palpation, no bleeding present  Uterus:     Uterus:  firm, normal sized and nontender, midplane in position.   Adnexa:     No adnexal tenderness present, no adnexal masses present  Perineum:     Perineum within normal limits, no evidence of trauma, no rashes or skin lesions present  Anus:     Anus within normal limits, no hemorrhoids present  Inguinal Lymph Nodes:     No lymphadenopathy present  Pubic Hair:     Normal pubic hair distribution for age  Genitalia and Groin:     No rashes present, no lesions present, no areas of discoloration, no masses present      COUNSELING:   Reviewed preventive health counseling, as reflected in patient instructions       Regular exercise       Healthy diet/nutrition    BMI: Body mass index is 31.95 kg/m .  Weight management plan: Discussed healthy diet and exercise guidelines    ASSESSMENT:  35 year old female with satisfactory annual exam.    ICD-10-CM    1. Encounter for gynecological examination without abnormal finding  Z01.419        PLAN: We have asked the patient to start some ovulation testing.  If she is not pregnant in 3 months with good coverage and ovulation the neck step will be to get a sperm count.  We will convey her Pap smear when available.      Matt Ray MD

## 2020-11-26 ASSESSMENT — ANXIETY QUESTIONNAIRES: GAD7 TOTAL SCORE: 3

## 2020-12-01 LAB
COPATH REPORT: NORMAL
PAP: NORMAL

## 2020-12-08 ENCOUNTER — PATIENT OUTREACH (OUTPATIENT)
Dept: OBGYN | Facility: CLINIC | Age: 35
End: 2020-12-08

## 2020-12-08 DIAGNOSIS — R87.810 CERVICAL HIGH RISK HPV (HUMAN PAPILLOMAVIRUS) TEST POSITIVE: ICD-10-CM

## 2020-12-08 NOTE — TELEPHONE ENCOUNTER
6/3/15 NIL/+ HR HPV 18. Plan: repeat pap in 1 year per provider  8/9/16 NIL pap  7/21/17 NIL/+ HR HPV 18 and other (NOT16).  Plan: colpo  8/17/17 [facility]: Atypical cells present.  ASCUS pap, + HR HPV 18 and other. Plan: cotest in 1 year  09/07/18 Patient is lost to pap tracking follow-up.   10/26/18 NIL pap/+ HR HPV 18 & other.  Plan: colpo  11/30/18 Colpo: visually normal. AIS pap/+ HR HPV 18 & other HR type.  Plan: consult  12/5/18 ECC: Malignant endocervical glands present. Plan: cervical conization 1/8/19.   1/8/19 [facility]-AIS, margins favor AIS. ECC-neg. Plan: repeat cone bx and ECC    5/23/19 Repeat Conization:  Bx/ECC-neg.  Plan: Cotest in October 2019  10/16/19 NIL pap, Neg HPV.  Plan: cotest in 6 months  04/28/20 NIL Pap, Neg HPV. Plan cotest in 6 months due 10/28/20.   10/14/20 Reminder letter sent.  11/25/20 NIL, Neg HPV. Plan annual pap per provider

## 2021-02-24 NOTE — PROGRESS NOTES
SUBJECTIVE:                                                   Jose L Collazo is a 36 year old female who presents to clinic today for the following health issue(s):  Patient presents with:  Consult: trying for pregnancy-here for next steps. She did leave a UPT.        HPI: The patient is seen at this time for ongoing evaluation of ovulation.  She has done ovulation testing for the last 3 months and it has been positive.  She had good coverage of her Diop's Day and is 5 days away from her menses at this time.  Pregnancy test is performed today.  She has not had a hysterosalpingogram and her  has not had a sperm count.  We will schedule those appropriately.      Patient's last menstrual period was 2021..     Patient is sexually active, .  Using none for contraception.    reports that she has never smoked. She has never used smokeless tobacco.      Health maintenance updated:  yes    Today's PHQ-2 Score:   PHQ-2 (  [employer]) 2021   Q1: Little interest or pleasure in doing things 0   Q2: Feeling down, depressed or hopeless 0   PHQ-2 Score 0     Today's PHQ-9 Score:   PHQ-9 SCORE 2020   PHQ-9 Total Score 2     Today's ANA-7 Score:   ANA-7 SCORE 2020   Total Score 3       Problem list and histories reviewed & adjusted, as indicated.  Additional history: as documented.    Patient Active Problem List   Diagnosis     Cervical high risk HPV (human papillomavirus) test positive     Endocervical adenocarcinoma (H)     Past Surgical History:   Procedure Laterality Date     CONIZATION N/A 2019    Procedure: CERVICAL CONIZATION;  Surgeon: Matt Ray MD;  Location:  OR     CONIZATION N/A 2019    Procedure: CERVICAL CONIZATION;  Surgeon: Matt Ray MD;  Location:  OR     DILATION AND CURETTAGE N/A 2019    Procedure: ENDOCERVICAL CURETTAGE;  Surgeon: Matt Ray MD;  Location:  OR     DILATION AND CURETTAGE N/A 2019    Procedure:  "ENDOCERVICAL CURETTAGE;  Surgeon: Matt Ray MD;  Location:  OR      TOOTH EXTRACTION W/FORCEP  2003     MAMMOPLASTY REDUCTION  05/25/2017      Social History     Tobacco Use     Smoking status: Never Smoker     Smokeless tobacco: Never Used   Substance Use Topics     Alcohol use: No     Alcohol/week: 0.0 standard drinks     Comment: 1-2 glasses of wine per week      Problem (# of Occurrences) Relation (Name,Age of Onset)    Cancer (1) Maternal Grandfather: multiple melanoma ? r/t exposure to Agent Orange    Lung Cancer (1) Maternal Grandmother: non smoker    No Known Problems (7) Mother, Father, Paternal Grandmother, Paternal Grandfather, Sister, Brother, Other       Negative family history of: Asthma, C.A.D., Diabetes, Hypertension            Current Outpatient Medications   Medication Sig     methylphenidate (RITALIN) 10 MG tablet 3 times daily      Prenatal Vit-Fe Fumarate-FA (PRENATAL MULTIVITAMIN  PLUS IRON) 27-1 MG TABS Take by mouth daily     clonazePAM (KLONOPIN) 0.5 MG tablet      No current facility-administered medications for this visit.      Allergies   Allergen Reactions     Diphenhydramine Visual Disturbance     PN: LW Reaction: makes me crazy  Hallucinations, decreased motor control     Seasonal Allergies        ROS:  12 point review of systems negative other than symptoms noted below or in the HPI.  No urinary frequency or dysuria, bladder or kidney problems      OBJECTIVE:     /70   Pulse 80   Ht 1.721 m (5' 7.75\")   Wt 93 kg (205 lb)   LMP 01/23/2021   BMI 31.40 kg/m    Body mass index is 31.4 kg/m .    Exam:  Constitutional:  Appearance: Well nourished, well developed alert, in no acute distress  Skin: General Inspection:  No rashes present, no lesions present, no areas of discoloration.  Neurologic:  Mental Status:  Oriented X3.  Normal strength and tone, sensory exam grossly normal, mentation intact and speech normal.    Psychiatric:  Mentation appears normal and affect " normal/bright.  No Pelvic Exam performed     In-Clinic Test Results: Negative qualitative hCG      ASSESSMENT/PLAN:                                                        ICD-10-CM    1. Amenorrhea  N91.2 HCG Qual, Urine ([id number])       36-year-old who appears to be ovulatory over the last 3 months.  If she does not get a normal menses next week we will have her come in for repeat pregnancy test when she has a positive at home.  In 3 months we will order a sperm count.  In 6 months will do a hysterosalpingogram.        Matt Ray MD  [facility] [place]

## 2021-02-25 ENCOUNTER — OFFICE VISIT (OUTPATIENT)
Dept: OBGYN | Facility: CLINIC | Age: 36
End: 2021-02-25
Payer: COMMERCIAL

## 2021-02-25 VITALS
HEIGHT: 68 IN | HEART RATE: 80 BPM | WEIGHT: 205 LBS | BODY MASS INDEX: 31.07 KG/M2 | SYSTOLIC BLOOD PRESSURE: 122 MMHG | DIASTOLIC BLOOD PRESSURE: 70 MMHG

## 2021-02-25 DIAGNOSIS — N91.2 AMENORRHEA: Primary | ICD-10-CM

## 2021-02-25 LAB — HCG UR QL: NEGATIVE

## 2021-02-25 PROCEDURE — 81025 URINE PREGNANCY TEST: CPT | Performed by: OBSTETRICS & GYNECOLOGY

## 2021-02-25 PROCEDURE — 99213 OFFICE O/P EST LOW 20 MIN: CPT | Performed by: OBSTETRICS & GYNECOLOGY

## 2021-02-25 ASSESSMENT — MIFFLIN-ST. JEOR: SCORE: 1669.4

## 2021-03-21 ENCOUNTER — HEALTH MAINTENANCE LETTER (OUTPATIENT)
Age: 36
End: 2021-03-21

## 2021-05-28 ENCOUNTER — MEDICAL CORRESPONDENCE (OUTPATIENT)
Dept: HEALTH INFORMATION MANAGEMENT | Facility: CLINIC | Age: 36
End: 2021-05-28

## 2021-05-28 ENCOUNTER — TELEPHONE (OUTPATIENT)
Dept: OBGYN | Facility: CLINIC | Age: 36
End: 2021-05-28

## 2021-05-28 NOTE — TELEPHONE ENCOUNTER
Order faxed to [facility] for  Carmelo Gao  [birth year]    Will call today or early next week to make an appt with [facility]  Brenna Soriano RN on 2021 at 3:04 PM

## 2021-05-28 NOTE — TELEPHONE ENCOUNTER
2/25/21  OV  36-year-old who appears to be ovulatory over the last 3 months.  If she does not get a normal menses next week we will have her come in for repeat pregnancy test when she has a positive at home.  In 3 months we will order a sperm count.  In 6 months will do a hysterosalpingogram.     Brenna Soriano RN on 5/28/2021 at 2:42 PM

## 2021-05-28 NOTE — TELEPHONE ENCOUNTER
Patient is calling today because she is still not pregnant. Dr. Ray had suggested that her  do a semen analysis. Please call patient back to devise a plan.

## 2021-09-04 ENCOUNTER — HEALTH MAINTENANCE LETTER (OUTPATIENT)
Age: 36
End: 2021-09-04

## 2021-11-11 ENCOUNTER — PATIENT OUTREACH (OUTPATIENT)
Dept: OBGYN | Facility: CLINIC | Age: 36
End: 2021-11-11
Payer: COMMERCIAL

## 2021-11-11 DIAGNOSIS — R87.810 CERVICAL HIGH RISK HPV (HUMAN PAPILLOMAVIRUS) TEST POSITIVE: ICD-10-CM

## 2021-11-11 NOTE — LETTER
November 11, 2021      Jose L Collazo  [id number] [facility] [address]        Dear ,    This letter is to remind you that you are due for your follow-up Pap smear and Human Papillomavirus (HPV) test.    Please call [phone] to schedule your appointment at your earliest convenience.    If you have completed the appointment outside of the [facility] system, please have the records forwarded to our office. We will update your chart for your provider to review before your next annual wellness visit.     Thank you for choosing [facility]!      Sincerely,    Your [facility] Care Team

## 2021-11-19 ENCOUNTER — TELEPHONE (OUTPATIENT)
Dept: OBGYN | Facility: CLINIC | Age: 36
End: 2021-11-19
Payer: COMMERCIAL

## 2021-12-15 ENCOUNTER — PRENATAL OFFICE VISIT (OUTPATIENT)
Dept: NURSING | Facility: CLINIC | Age: 36
End: 2021-12-15
Payer: COMMERCIAL

## 2021-12-15 VITALS — WEIGHT: 205 LBS | HEIGHT: 68 IN | BODY MASS INDEX: 31.07 KG/M2

## 2021-12-15 DIAGNOSIS — O36.80X0 PREGNANCY WITH INCONCLUSIVE FETAL VIABILITY: ICD-10-CM

## 2021-12-15 DIAGNOSIS — Z13.79 GENETIC SCREENING: Primary | ICD-10-CM

## 2021-12-15 DIAGNOSIS — O09.91 SUPERVISION OF HIGH RISK PREGNANCY IN FIRST TRIMESTER: ICD-10-CM

## 2021-12-15 PROCEDURE — 99207 PR NO CHARGE NURSE ONLY: CPT

## 2021-12-15 RX ORDER — MULTIVITAMIN WITH IRON
1 TABLET ORAL DAILY
COMMUNITY
End: 2023-12-19

## 2021-12-15 ASSESSMENT — MIFFLIN-ST. JEOR: SCORE: 1665.43
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
## 2021-12-17 ENCOUNTER — ANCILLARY PROCEDURE (OUTPATIENT)
Dept: ULTRASOUND IMAGING | Facility: CLINIC | Age: 36
End: 2021-12-17
Payer: COMMERCIAL

## 2021-12-17 ENCOUNTER — PRENATAL OFFICE VISIT (OUTPATIENT)
Dept: OBGYN | Facility: CLINIC | Age: 36
End: 2021-12-17
Payer: COMMERCIAL

## 2021-12-17 VITALS — BODY MASS INDEX: 28.3 KG/M2 | SYSTOLIC BLOOD PRESSURE: 94 MMHG | WEIGHT: 183.4 LBS | DIASTOLIC BLOOD PRESSURE: 60 MMHG

## 2021-12-17 DIAGNOSIS — O36.80X0 PREGNANCY WITH INCONCLUSIVE FETAL VIABILITY: ICD-10-CM

## 2021-12-17 DIAGNOSIS — C53.0 ENDOCERVICAL ADENOCARCINOMA (H): Primary | ICD-10-CM

## 2021-12-17 DIAGNOSIS — O09.91 SUPERVISION OF HIGH RISK PREGNANCY IN FIRST TRIMESTER: ICD-10-CM

## 2021-12-17 LAB
ABO/RH(D): NORMAL
ALBUMIN UR-MCNC: NEGATIVE MG/DL
ANTIBODY SCREEN: NEGATIVE
APPEARANCE UR: CLEAR
BILIRUB UR QL STRIP: NEGATIVE
COLOR UR AUTO: YELLOW
ERYTHROCYTE [DISTWIDTH] IN BLOOD BY AUTOMATED COUNT: 12 % (ref 10–15)
GLUCOSE UR STRIP-MCNC: NEGATIVE MG/DL
HCT VFR BLD AUTO: 38.9 % (ref 35–47)
HGB BLD-MCNC: 13.2 G/DL (ref 11.7–15.7)
HGB UR QL STRIP: NEGATIVE
KETONES UR STRIP-MCNC: ABNORMAL MG/DL
LEUKOCYTE ESTERASE UR QL STRIP: NEGATIVE
MCH RBC QN AUTO: 29.5 PG (ref 26.5–33)
MCHC RBC AUTO-ENTMCNC: 33.9 G/DL (ref 31.5–36.5)
MCV RBC AUTO: 87 FL (ref 78–100)
NITRATE UR QL: NEGATIVE
PH UR STRIP: 5.5 [PH] (ref 5–7)
PLATELET # BLD AUTO: 286 10E3/UL (ref 150–450)
RBC # BLD AUTO: 4.47 10E6/UL (ref 3.8–5.2)
SP GR UR STRIP: >=1.03 (ref 1–1.03)
SPECIMEN EXPIRATION DATE: NORMAL
UROBILINOGEN UR STRIP-ACNC: 0.2 E.U./DL
WBC # BLD AUTO: 8.6 10E3/UL (ref 4–11)

## 2021-12-17 PROCEDURE — 86850 RBC ANTIBODY SCREEN: CPT | Performed by: OBSTETRICS & GYNECOLOGY

## 2021-12-17 PROCEDURE — 86900 BLOOD TYPING SEROLOGIC ABO: CPT | Performed by: OBSTETRICS & GYNECOLOGY

## 2021-12-17 PROCEDURE — 81003 URINALYSIS AUTO W/O SCOPE: CPT | Performed by: OBSTETRICS & GYNECOLOGY

## 2021-12-17 PROCEDURE — 87340 HEPATITIS B SURFACE AG IA: CPT | Performed by: OBSTETRICS & GYNECOLOGY

## 2021-12-17 PROCEDURE — 76817 TRANSVAGINAL US OBSTETRIC: CPT | Performed by: OBSTETRICS & GYNECOLOGY

## 2021-12-17 PROCEDURE — 87086 URINE CULTURE/COLONY COUNT: CPT | Performed by: OBSTETRICS & GYNECOLOGY

## 2021-12-17 PROCEDURE — 86762 RUBELLA ANTIBODY: CPT | Performed by: OBSTETRICS & GYNECOLOGY

## 2021-12-17 PROCEDURE — 86780 TREPONEMA PALLIDUM: CPT | Performed by: OBSTETRICS & GYNECOLOGY

## 2021-12-17 PROCEDURE — 36415 COLL VENOUS BLD VENIPUNCTURE: CPT | Performed by: OBSTETRICS & GYNECOLOGY

## 2021-12-17 PROCEDURE — 86901 BLOOD TYPING SEROLOGIC RH(D): CPT | Performed by: OBSTETRICS & GYNECOLOGY

## 2021-12-17 PROCEDURE — 86803 HEPATITIS C AB TEST: CPT | Performed by: OBSTETRICS & GYNECOLOGY

## 2021-12-17 PROCEDURE — 85027 COMPLETE CBC AUTOMATED: CPT | Performed by: OBSTETRICS & GYNECOLOGY

## 2021-12-17 PROCEDURE — 99207 PR FIRST OB VISIT: CPT | Performed by: OBSTETRICS & GYNECOLOGY

## 2021-12-17 PROCEDURE — 87389 HIV-1 AG W/HIV-1&-2 AB AG IA: CPT | Performed by: OBSTETRICS & GYNECOLOGY

## 2021-12-17 RX ORDER — CHLORAL HYDRATE 500 MG
2 CAPSULE ORAL DAILY
COMMUNITY
End: 2022-02-22

## 2021-12-17 NOTE — PATIENT INSTRUCTIONS
Avoid alcoholic beverages.  Discussed advanced maternal age and testing options.  Patient is above age 36 at delivery.  Explained risks of chromosomal abnormalities and age risks.  Discussed amnio versus non-invasive testing and their individual limitations and risks.  Patient would like non-invasive testing to start with.  Understands Invitae is a screening test and not a diagnostic test.  Will also plan to check AFP at 15-20wks and arrange Level II us with MFM at 18-20wks.  With history of cold knife cone biopsy, will plan to have MFM check cervical lengths with Level II us  Reviewed usual and expected course of pregnancy including visits, labwork, imaging, vaccinations, etc.  Will RTC 4wks for routine OB visit.  Discussed covid and vaccinations in pregnancy.  Encouraged Jose L to arrange covid booster.

## 2021-12-18 LAB — BACTERIA UR CULT: NORMAL

## 2021-12-19 LAB — T PALLIDUM AB SER QL: NONREACTIVE

## 2021-12-20 LAB
HBV SURFACE AG SERPL QL IA: NONREACTIVE
HCV AB SERPL QL IA: NONREACTIVE
HIV 1+2 AB+HIV1 P24 AG SERPL QL IA: NONREACTIVE
RUBV IGG SERPL QL IA: 3.29 INDEX
RUBV IGG SERPL QL IA: POSITIVE

## 2021-12-25 ENCOUNTER — HEALTH MAINTENANCE LETTER (OUTPATIENT)
Age: 36
End: 2021-12-25

## 2022-01-14 NOTE — TELEPHONE ENCOUNTER
1/14/22 pregnancy sticky note states *PP pap*. Chart updated  Estimated Date of Delivery: Jul 25, 2022

## 2022-01-21 ENCOUNTER — PRENATAL OFFICE VISIT (OUTPATIENT)
Dept: OBGYN | Facility: CLINIC | Age: 37
End: 2022-01-21
Payer: COMMERCIAL

## 2022-01-21 VITALS — SYSTOLIC BLOOD PRESSURE: 94 MMHG | BODY MASS INDEX: 28.86 KG/M2 | WEIGHT: 187 LBS | DIASTOLIC BLOOD PRESSURE: 62 MMHG

## 2022-01-21 DIAGNOSIS — Z85.41 HX OF CERVICAL CANCER: Primary | ICD-10-CM

## 2022-01-21 DIAGNOSIS — Z3A.13 13 WEEKS GESTATION OF PREGNANCY: ICD-10-CM

## 2022-01-21 DIAGNOSIS — Z13.79 GENETIC SCREENING: ICD-10-CM

## 2022-01-21 DIAGNOSIS — Z98.890 STATUS POST LEEP (LOOP ELECTROSURGICAL EXCISION PROCEDURE) OF CERVIX: ICD-10-CM

## 2022-01-21 DIAGNOSIS — O09.91 SUPERVISION OF HIGH RISK PREGNANCY IN FIRST TRIMESTER: ICD-10-CM

## 2022-01-21 PROCEDURE — 36415 COLL VENOUS BLD VENIPUNCTURE: CPT | Performed by: OBSTETRICS & GYNECOLOGY

## 2022-01-21 PROCEDURE — 99207 PR PRENATAL VISIT: CPT | Performed by: OBSTETRICS & GYNECOLOGY

## 2022-01-21 NOTE — PROGRESS NOTES
Doing well today. Feeling much better  No vb/spotting/cramping  No bowel/bladder issues  Good energy/good appetite  AMA --interested in invitae today -will check AFP at next visit and will see MFM for Level II us at 18-20wks   RTC 4wks

## 2022-01-24 ENCOUNTER — TRANSCRIBE ORDERS (OUTPATIENT)
Dept: MATERNAL FETAL MEDICINE | Facility: CLINIC | Age: 37
End: 2022-01-24
Payer: COMMERCIAL

## 2022-01-24 DIAGNOSIS — O26.90 PREGNANCY RELATED CONDITION: Primary | ICD-10-CM

## 2022-02-01 ENCOUNTER — TELEPHONE (OUTPATIENT)
Dept: OBGYN | Facility: CLINIC | Age: 37
End: 2022-02-01
Payer: COMMERCIAL

## 2022-02-01 LAB — SCANNED LAB RESULT: NORMAL

## 2022-02-15 ENCOUNTER — PRE VISIT (OUTPATIENT)
Dept: MATERNAL FETAL MEDICINE | Facility: CLINIC | Age: 37
End: 2022-02-15
Payer: COMMERCIAL

## 2022-02-22 ENCOUNTER — OFFICE VISIT (OUTPATIENT)
Dept: MATERNAL FETAL MEDICINE | Facility: CLINIC | Age: 37
End: 2022-02-22
Attending: OBSTETRICS & GYNECOLOGY
Payer: COMMERCIAL

## 2022-02-22 ENCOUNTER — PRENATAL OFFICE VISIT (OUTPATIENT)
Dept: OBGYN | Facility: CLINIC | Age: 37
End: 2022-02-22
Payer: COMMERCIAL

## 2022-02-22 ENCOUNTER — HOSPITAL ENCOUNTER (OUTPATIENT)
Dept: ULTRASOUND IMAGING | Facility: CLINIC | Age: 37
End: 2022-02-22
Attending: OBSTETRICS & GYNECOLOGY
Payer: COMMERCIAL

## 2022-02-22 VITALS — WEIGHT: 193.2 LBS | BODY MASS INDEX: 29.81 KG/M2 | DIASTOLIC BLOOD PRESSURE: 52 MMHG | SYSTOLIC BLOOD PRESSURE: 110 MMHG

## 2022-02-22 DIAGNOSIS — Z36.2 ENCOUNTER FOR FOLLOW-UP ULTRASOUND OF FETAL ANATOMY: ICD-10-CM

## 2022-02-22 DIAGNOSIS — O26.90 PREGNANCY RELATED CONDITION: ICD-10-CM

## 2022-02-22 DIAGNOSIS — O09.92 SUPERVISION OF HIGH RISK PREGNANCY IN SECOND TRIMESTER: ICD-10-CM

## 2022-02-22 DIAGNOSIS — Z3A.18 18 WEEKS GESTATION OF PREGNANCY: ICD-10-CM

## 2022-02-22 DIAGNOSIS — O09.522 MULTIGRAVIDA OF ADVANCED MATERNAL AGE IN SECOND TRIMESTER: Primary | ICD-10-CM

## 2022-02-22 DIAGNOSIS — Z36.1 NEED FOR MATERNAL SERUM ALPHA-PROTEIN (MSAFP) SCREENING: Primary | ICD-10-CM

## 2022-02-22 PROCEDURE — 36415 COLL VENOUS BLD VENIPUNCTURE: CPT | Performed by: OBSTETRICS & GYNECOLOGY

## 2022-02-22 PROCEDURE — 99207 PR PRENATAL VISIT: CPT | Performed by: OBSTETRICS & GYNECOLOGY

## 2022-02-22 PROCEDURE — 99000 SPECIMEN HANDLING OFFICE-LAB: CPT | Performed by: OBSTETRICS & GYNECOLOGY

## 2022-02-22 PROCEDURE — 82105 ALPHA-FETOPROTEIN SERUM: CPT | Mod: 90 | Performed by: OBSTETRICS & GYNECOLOGY

## 2022-02-22 PROCEDURE — 76811 OB US DETAILED SNGL FETUS: CPT | Mod: 26 | Performed by: OBSTETRICS & GYNECOLOGY

## 2022-02-22 PROCEDURE — 76817 TRANSVAGINAL US OBSTETRIC: CPT | Mod: 26 | Performed by: OBSTETRICS & GYNECOLOGY

## 2022-02-22 PROCEDURE — 76811 OB US DETAILED SNGL FETUS: CPT

## 2022-02-22 NOTE — PROGRESS NOTES
Doing well today.  +flutters over the last week  No cramping; no vb/spotting  No bowel/bladder issues  Had Level II us today with MFM --normal but was unable to see a few things so will repeat in 4wks  Cervical length normal at 28mm without funneling  Normal invitae; will check AFP today  RTC 4wks

## 2022-02-24 LAB
# FETUSES US: NORMAL
AFP MOM SERPL: 0.99
AFP SERPL-MCNC: 38 NG/ML
AGE - REPORTED: 36.5 YR
CURRENT SMOKER: NO
FAMILY MEMBER DISEASES HX: NO
GA METHOD: NORMAL
GA: NORMAL WK
IDDM PATIENT QL: NO
INTEGRATED SCN PATIENT-IMP: NORMAL
SPECIMEN DRAWN SERPL: NORMAL

## 2022-03-15 ENCOUNTER — HOSPITAL ENCOUNTER (OUTPATIENT)
Dept: ULTRASOUND IMAGING | Facility: CLINIC | Age: 37
Discharge: HOME | End: 2022-03-15
Attending: OBSTETRICS & GYNECOLOGY
Payer: COMMERCIAL

## 2022-03-15 ENCOUNTER — OFFICE VISIT (OUTPATIENT)
Dept: MATERNAL FETAL MEDICINE | Facility: CLINIC | Age: 37
End: 2022-03-15
Attending: OBSTETRICS & GYNECOLOGY
Payer: COMMERCIAL

## 2022-03-15 DIAGNOSIS — Z36.2 ENCOUNTER FOR FOLLOW-UP ULTRASOUND OF FETAL ANATOMY: ICD-10-CM

## 2022-03-15 DIAGNOSIS — O09.522 AMA (ADVANCED MATERNAL AGE) MULTIGRAVIDA 35+, SECOND TRIMESTER: Primary | ICD-10-CM

## 2022-03-15 PROCEDURE — 76816 OB US FOLLOW-UP PER FETUS: CPT

## 2022-03-15 PROCEDURE — 76816 OB US FOLLOW-UP PER FETUS: CPT | Mod: 26 | Performed by: OBSTETRICS & GYNECOLOGY

## 2022-03-21 ENCOUNTER — TELEPHONE (OUTPATIENT)
Dept: OBGYN | Facility: CLINIC | Age: 37
End: 2022-03-21
Payer: COMMERCIAL

## 2022-03-21 NOTE — TELEPHONE ENCOUNTER
Called pt, wanting a medical opinion on situation and discussed options: parents are willing to quarantine before their journey via car (not flight). Patient is vaccinated, reassured this is protecting her and she could still get the Covid virus from others in the store/restuarants/work, vaccine should decrease the severity. Parents quarantine prior to stay is a good option to decrease risk of transmission and she could ask them to protect by wearing a mask as well miko when baby comes.    Hard to control the situation but can ask them to respect her decision in her home and w her new baby.    Pt verbalized understanding, in agreement with plan, and voiced no further questions.  Jamila Ortiz RN on 3/21/2022 at 11:17 AM

## 2022-03-21 NOTE — TELEPHONE ENCOUNTER
Patient is 22 weeks pregnant. Her in-laws are coming to visit soon and they are not vaccinated. She would like  to give her opinion as to if it is safe for the baby or not? Please call patient back on Tuesday

## 2022-03-31 ENCOUNTER — PRENATAL OFFICE VISIT (OUTPATIENT)
Dept: OBGYN | Facility: CLINIC | Age: 37
End: 2022-03-31
Payer: COMMERCIAL

## 2022-03-31 VITALS — WEIGHT: 199 LBS | SYSTOLIC BLOOD PRESSURE: 102 MMHG | DIASTOLIC BLOOD PRESSURE: 60 MMHG | BODY MASS INDEX: 30.71 KG/M2

## 2022-03-31 DIAGNOSIS — O09.522 AMA (ADVANCED MATERNAL AGE) MULTIGRAVIDA 35+, SECOND TRIMESTER: ICD-10-CM

## 2022-03-31 DIAGNOSIS — O09.92 SUPERVISION OF HIGH RISK PREGNANCY IN SECOND TRIMESTER: Primary | ICD-10-CM

## 2022-03-31 PROCEDURE — 99207 PR PRENATAL VISIT: CPT | Performed by: OBSTETRICS & GYNECOLOGY

## 2022-03-31 NOTE — PROGRESS NOTES
No loss of fluid/vaginal bleeding/regular contractions. + FM  -AMA. NIPT neg. Level II wnl.   - Labor precautions. F/U 4wk for 28wk Labs.    Ilana Davalos Masters, DO

## 2022-04-18 DIAGNOSIS — Z23 NEED FOR TDAP VACCINATION: ICD-10-CM

## 2022-04-18 DIAGNOSIS — Z36.9 ENCOUNTER FOR ANTENATAL SCREENING OF MOTHER: Primary | ICD-10-CM

## 2022-04-26 ENCOUNTER — PRENATAL OFFICE VISIT (OUTPATIENT)
Dept: OBGYN | Facility: CLINIC | Age: 37
End: 2022-04-26
Payer: COMMERCIAL

## 2022-04-26 ENCOUNTER — LAB (OUTPATIENT)
Dept: LAB | Facility: CLINIC | Age: 37
End: 2022-04-26

## 2022-04-26 VITALS — WEIGHT: 204 LBS | BODY MASS INDEX: 31.48 KG/M2 | SYSTOLIC BLOOD PRESSURE: 126 MMHG | DIASTOLIC BLOOD PRESSURE: 74 MMHG

## 2022-04-26 DIAGNOSIS — Z36.9 ENCOUNTER FOR ANTENATAL SCREENING OF MOTHER: ICD-10-CM

## 2022-04-26 DIAGNOSIS — R73.02 IMPAIRED GLUCOSE TOLERANCE: Primary | ICD-10-CM

## 2022-04-26 DIAGNOSIS — O09.92 SUPERVISION OF HIGH RISK PREGNANCY IN SECOND TRIMESTER: Primary | ICD-10-CM

## 2022-04-26 DIAGNOSIS — Z23 NEED FOR TDAP VACCINATION: ICD-10-CM

## 2022-04-26 DIAGNOSIS — Z3A.27 27 WEEKS GESTATION OF PREGNANCY: ICD-10-CM

## 2022-04-26 PROBLEM — O09.90 SUPERVISION OF HIGH-RISK PREGNANCY: Status: ACTIVE | Noted: 2021-12-15

## 2022-04-26 LAB
ERYTHROCYTE [DISTWIDTH] IN BLOOD BY AUTOMATED COUNT: 11.8 % (ref 10–15)
GLUCOSE 1H P 50 G GLC PO SERPL-MCNC: 131 MG/DL (ref 70–129)
HCT VFR BLD AUTO: 35.9 % (ref 35–47)
HGB BLD-MCNC: 12.1 G/DL (ref 11.7–15.7)
MCH RBC QN AUTO: 30.2 PG (ref 26.5–33)
MCHC RBC AUTO-ENTMCNC: 33.7 G/DL (ref 31.5–36.5)
MCV RBC AUTO: 90 FL (ref 78–100)
PLATELET # BLD AUTO: 317 10E3/UL (ref 150–450)
RBC # BLD AUTO: 4.01 10E6/UL (ref 3.8–5.2)
WBC # BLD AUTO: 9.7 10E3/UL (ref 4–11)

## 2022-04-26 PROCEDURE — 90715 TDAP VACCINE 7 YRS/> IM: CPT | Performed by: OBSTETRICS & GYNECOLOGY

## 2022-04-26 PROCEDURE — 85027 COMPLETE CBC AUTOMATED: CPT

## 2022-04-26 PROCEDURE — 90471 IMMUNIZATION ADMIN: CPT | Performed by: OBSTETRICS & GYNECOLOGY

## 2022-04-26 PROCEDURE — 82950 GLUCOSE TEST: CPT

## 2022-04-26 PROCEDURE — 36415 COLL VENOUS BLD VENIPUNCTURE: CPT

## 2022-04-26 PROCEDURE — 99207 PR PRENATAL VISIT: CPT | Performed by: OBSTETRICS & GYNECOLOGY

## 2022-04-26 NOTE — NURSING NOTE
Prior to immunization administration, verified patients identity using patient s name and date of birth. Please see Immunization Activity for additional information.     Screening Questionnaire for Adult Immunization    Are you sick today?   No   Do you have allergies to medications, food, a vaccine component or latex?   No   Have you ever had a serious reaction after receiving a vaccination?   No   Do you have a long-term health problem with heart, lung, kidney, or metabolic disease (e.g., diabetes), asthma, a blood disorder, no spleen, complement component deficiency, a cochlear implant, or a spinal fluid leak?  Are you on long-term aspirin therapy?   No   Do you have cancer, leukemia, HIV/AIDS, or any other immune system problem?   No   Do you have a parent, brother, or sister with an immune system problem?   No   In the past 3 months, have you taken medications that affect  your immune system, such as prednisone, other steroids, or anticancer drugs; drugs for the treatment of rheumatoid arthritis, Crohn s disease, or psoriasis; or have you had radiation treatments?   No   Have you had a seizure, or a brain or other nervous system problem?   No   During the past year, have you received a transfusion of blood or blood    products, or been given immune (gamma) globulin or antiviral drug?   No   For women: Are you pregnant or is there a chance you could become       pregnant during the next month?   yes   Have you received any vaccinations in the past 4 weeks?   No     Immunization questionnaire answers were all negative.        Per orders of Dr. Mattson, injection of Tdap given by Nydia Barnhart CMA. Patient instructed to remain in clinic for 15 minutes afterwards, and to report any adverse reaction to me immediately.       Screening performed by Nydia Barnhart CMA on 4/26/2022 at 3:34 PM.

## 2022-04-26 NOTE — PROGRESS NOTES
Doing well today.  No concerns/issues  +FM  No ctx/cramping/vb/lof; has had 1 [place] xavier contraction  No bowel/bladder issues; using daily TUMS for GERD  Signed up for classes, will be using [facility] for peds and will do registration  Glucola, CBC and Tdap today  RTC 3wks

## 2022-05-05 ENCOUNTER — LAB (OUTPATIENT)
Dept: LAB | Facility: CLINIC | Age: 37
End: 2022-05-05
Payer: COMMERCIAL

## 2022-05-05 DIAGNOSIS — R73.02 IMPAIRED GLUCOSE TOLERANCE: ICD-10-CM

## 2022-05-05 LAB
GESTATIONAL GTT 1 HR POST DOSE: 135 MG/DL (ref 60–179)
GLUCOSE P FAST SERPL-MCNC: 85 MG/DL (ref 60–94)

## 2022-05-05 PROCEDURE — 36415 COLL VENOUS BLD VENIPUNCTURE: CPT

## 2022-05-05 PROCEDURE — 82952 GTT-ADDED SAMPLES: CPT

## 2022-05-05 PROCEDURE — 82951 GLUCOSE TOLERANCE TEST (GTT): CPT

## 2022-05-06 LAB
GESTATIONAL GTT 2 HR POST DOSE: 105 MG/DL (ref 60–154)
GESTATIONAL GTT 3 HR POST DOSE: 57 MG/DL (ref 60–139)

## 2022-05-17 ENCOUNTER — PRENATAL OFFICE VISIT (OUTPATIENT)
Dept: OBGYN | Facility: CLINIC | Age: 37
End: 2022-05-17
Payer: COMMERCIAL

## 2022-05-17 VITALS — SYSTOLIC BLOOD PRESSURE: 92 MMHG | BODY MASS INDEX: 31.79 KG/M2 | WEIGHT: 206 LBS | DIASTOLIC BLOOD PRESSURE: 60 MMHG

## 2022-05-17 DIAGNOSIS — O09.93 SUPERVISION OF HIGH RISK PREGNANCY IN THIRD TRIMESTER: Primary | ICD-10-CM

## 2022-05-17 DIAGNOSIS — Z3A.30 30 WEEKS GESTATION OF PREGNANCY: ICD-10-CM

## 2022-05-17 PROCEDURE — 99207 PR PRENATAL VISIT: CPT | Performed by: OBSTETRICS & GYNECOLOGY

## 2022-05-17 NOTE — PROGRESS NOTES
Doing well today.  +FM  No ctx/cramping/vb/lof  Overall feeling well  No bowel/bladder issues  GERD much better with evening dose of pepcid but now having more daytime symptoms --will add AM dosing  Have classes all next month; plan to use family medicine provider as pediatrician --will check on lactation assistance/resources  RTC 2wks

## 2022-06-02 ENCOUNTER — PRENATAL OFFICE VISIT (OUTPATIENT)
Dept: OBGYN | Facility: CLINIC | Age: 37
End: 2022-06-02
Payer: COMMERCIAL

## 2022-06-02 VITALS — DIASTOLIC BLOOD PRESSURE: 72 MMHG | SYSTOLIC BLOOD PRESSURE: 118 MMHG | WEIGHT: 209 LBS | BODY MASS INDEX: 32.25 KG/M2

## 2022-06-02 DIAGNOSIS — Z3A.32 32 WEEKS GESTATION OF PREGNANCY: ICD-10-CM

## 2022-06-02 DIAGNOSIS — O09.93 SUPERVISION OF HIGH RISK PREGNANCY IN THIRD TRIMESTER: Primary | ICD-10-CM

## 2022-06-02 PROCEDURE — 99207 PR PRENATAL VISIT: CPT | Performed by: OBSTETRICS & GYNECOLOGY

## 2022-06-02 NOTE — PROGRESS NOTES
Doing well today.  +FM  No ctx/cramping/vb/lof  Overall doing well  Had CPR classes and first preparing for childbirth classes last night --very helpful  No bowel/bladder issues  RTC 2wks

## 2022-06-16 ENCOUNTER — PRENATAL OFFICE VISIT (OUTPATIENT)
Dept: OBGYN | Facility: CLINIC | Age: 37
End: 2022-06-16
Payer: COMMERCIAL

## 2022-06-16 VITALS — BODY MASS INDEX: 32.25 KG/M2 | WEIGHT: 209 LBS | SYSTOLIC BLOOD PRESSURE: 118 MMHG | DIASTOLIC BLOOD PRESSURE: 72 MMHG

## 2022-06-16 DIAGNOSIS — O09.93 SUPERVISION OF HIGH RISK PREGNANCY IN THIRD TRIMESTER: Primary | ICD-10-CM

## 2022-06-16 DIAGNOSIS — Z3A.34 34 WEEKS GESTATION OF PREGNANCY: ICD-10-CM

## 2022-06-16 PROCEDURE — 99207 PR PRENATAL VISIT: CPT | Performed by: OBSTETRICS & GYNECOLOGY

## 2022-06-30 ENCOUNTER — PRENATAL OFFICE VISIT (OUTPATIENT)
Dept: OBGYN | Facility: CLINIC | Age: 37
End: 2022-06-30
Payer: COMMERCIAL

## 2022-06-30 VITALS — DIASTOLIC BLOOD PRESSURE: 80 MMHG | BODY MASS INDEX: 32.56 KG/M2 | SYSTOLIC BLOOD PRESSURE: 122 MMHG | WEIGHT: 211 LBS

## 2022-06-30 DIAGNOSIS — O09.93 SUPERVISION OF HIGH RISK PREGNANCY IN THIRD TRIMESTER: Primary | ICD-10-CM

## 2022-06-30 DIAGNOSIS — Z3A.36 36 WEEKS GESTATION OF PREGNANCY: ICD-10-CM

## 2022-06-30 PROCEDURE — 87653 STREP B DNA AMP PROBE: CPT | Performed by: OBSTETRICS & GYNECOLOGY

## 2022-06-30 PROCEDURE — 99207 PR PRENATAL VISIT: CPT | Performed by: OBSTETRICS & GYNECOLOGY

## 2022-06-30 NOTE — PROGRESS NOTES
Doing well today.  +FM  No ctx/cramping/vb/spotting  occ BH contractions and definitely feeling more pressure as if baby dropped  GBS collected  Labor precautions reviewed   RTC 1wk

## 2022-07-02 LAB — GP B STREP DNA SPEC QL NAA+PROBE: NEGATIVE

## 2022-07-07 ENCOUNTER — PRENATAL OFFICE VISIT (OUTPATIENT)
Dept: OBGYN | Facility: CLINIC | Age: 37
End: 2022-07-07
Payer: COMMERCIAL

## 2022-07-07 VITALS — BODY MASS INDEX: 32.87 KG/M2 | DIASTOLIC BLOOD PRESSURE: 72 MMHG | SYSTOLIC BLOOD PRESSURE: 120 MMHG | WEIGHT: 213 LBS

## 2022-07-07 DIAGNOSIS — Z3A.37 37 WEEKS GESTATION OF PREGNANCY: ICD-10-CM

## 2022-07-07 DIAGNOSIS — O09.93 SUPERVISION OF HIGH RISK PREGNANCY IN THIRD TRIMESTER: Primary | ICD-10-CM

## 2022-07-07 PROCEDURE — 99207 PR PRENATAL VISIT: CPT | Performed by: OBSTETRICS & GYNECOLOGY

## 2022-07-07 NOTE — PROGRESS NOTES
Doing well.  No concerns today.  Cervix is closed, thin/-1 station.  Prenatal flowsheet information is reviewed.  Discussed signs of labor and when to call or come in.  Discussed kick counts and fetal movement.  Reportable signs and symptoms discussed.  Follow-up weekly until delivery

## 2022-07-13 ENCOUNTER — PRENATAL OFFICE VISIT (OUTPATIENT)
Dept: OBGYN | Facility: CLINIC | Age: 37
End: 2022-07-13
Payer: COMMERCIAL

## 2022-07-13 VITALS — DIASTOLIC BLOOD PRESSURE: 72 MMHG | BODY MASS INDEX: 33.02 KG/M2 | SYSTOLIC BLOOD PRESSURE: 126 MMHG | WEIGHT: 214 LBS

## 2022-07-13 DIAGNOSIS — Z3A.38 38 WEEKS GESTATION OF PREGNANCY: ICD-10-CM

## 2022-07-13 DIAGNOSIS — O09.93 SUPERVISION OF HIGH RISK PREGNANCY IN THIRD TRIMESTER: Primary | ICD-10-CM

## 2022-07-13 PROCEDURE — 99207 PR PRENATAL VISIT: CPT | Performed by: OBSTETRICS & GYNECOLOGY

## 2022-07-13 NOTE — PROGRESS NOTES
Doing well today.  +FM  More BH contractions yesterday but was able to sleep through the night and now quiet this AM; no vb/spotting/lof  +URI --started last week and now starting to feel better; 2 home neg COVID tests; no fevers, mild congestion  Labor precautions reviewed  RTC 1wk

## 2022-07-19 ENCOUNTER — PRENATAL OFFICE VISIT (OUTPATIENT)
Dept: OBGYN | Facility: CLINIC | Age: 37
End: 2022-07-19
Payer: COMMERCIAL

## 2022-07-19 VITALS — BODY MASS INDEX: 33.02 KG/M2 | WEIGHT: 214 LBS | DIASTOLIC BLOOD PRESSURE: 64 MMHG | SYSTOLIC BLOOD PRESSURE: 120 MMHG

## 2022-07-19 DIAGNOSIS — Z3A.39 39 WEEKS GESTATION OF PREGNANCY: ICD-10-CM

## 2022-07-19 DIAGNOSIS — O09.93 SUPERVISION OF HIGH RISK PREGNANCY IN THIRD TRIMESTER: Primary | ICD-10-CM

## 2022-07-19 PROCEDURE — 99207 PR PRENATAL VISIT: CPT | Performed by: OBSTETRICS & GYNECOLOGY

## 2022-07-19 NOTE — PROGRESS NOTES
Doing well today.  +FM  No ctx/cramping/vb/spotting; occ BH contractions  No bowel/blladder issues  Trying her best to be patient!!  RTC 1wk

## 2022-07-23 ENCOUNTER — HOSPITAL ENCOUNTER (INPATIENT)
Facility: CLINIC | Age: 37
LOS: 3 days | Discharge: HOME | End: 2022-07-26
Attending: OBSTETRICS & GYNECOLOGY | Admitting: OBSTETRICS & GYNECOLOGY
Payer: COMMERCIAL

## 2022-07-23 ENCOUNTER — ANESTHESIA EVENT (OUTPATIENT)
Dept: OBGYN | Facility: CLINIC | Age: 37
End: 2022-07-23
Payer: COMMERCIAL

## 2022-07-23 ENCOUNTER — ANESTHESIA (OUTPATIENT)
Dept: OBGYN | Facility: CLINIC | Age: 37
End: 2022-07-23
Payer: COMMERCIAL

## 2022-07-23 ENCOUNTER — NURSE TRIAGE (OUTPATIENT)
Dept: NURSING | Facility: CLINIC | Age: 37
End: 2022-07-23

## 2022-07-23 PROBLEM — Z36.89 ENCOUNTER FOR TRIAGE IN PREGNANT PATIENT: Status: ACTIVE | Noted: 2022-07-23

## 2022-07-23 LAB
ABO/RH(D): NORMAL
ANTIBODY SCREEN: NEGATIVE
ERYTHROCYTE [DISTWIDTH] IN BLOOD BY AUTOMATED COUNT: 12.6 % (ref 10–15)
HCT VFR BLD AUTO: 35.7 % (ref 35–47)
HGB BLD-MCNC: 12.2 G/DL (ref 11.7–15.7)
MCH RBC QN AUTO: 29.8 PG (ref 26.5–33)
MCHC RBC AUTO-ENTMCNC: 34.2 G/DL (ref 31.5–36.5)
MCV RBC AUTO: 87 FL (ref 78–100)
PLATELET # BLD AUTO: 295 10E3/UL (ref 150–450)
RBC # BLD AUTO: 4.1 10E6/UL (ref 3.8–5.2)
RUPTURE OF FETAL MEMBRANES BY ROM PLUS: POSITIVE
SARS-COV-2 RNA RESP QL NAA+PROBE: NEGATIVE
SPECIMEN EXPIRATION DATE: NORMAL
WBC # BLD AUTO: 13.2 10E3/UL (ref 4–11)

## 2022-07-23 PROCEDURE — U0003 INFECTIOUS AGENT DETECTION BY NUCLEIC ACID (DNA OR RNA); SEVERE ACUTE RESPIRATORY SYNDROME CORONAVIRUS 2 (SARS-COV-2) (CORONAVIRUS DISEASE [COVID-19]), AMPLIFIED PROBE TECHNIQUE, MAKING USE OF HIGH THROUGHPUT TECHNOLOGIES AS DESCRIBED BY CMS-2020-01-R: HCPCS | Performed by: OBSTETRICS & GYNECOLOGY

## 2022-07-23 PROCEDURE — 84112 EVAL AMNIOTIC FLUID PROTEIN: CPT | Performed by: OBSTETRICS & GYNECOLOGY

## 2022-07-23 PROCEDURE — 86780 TREPONEMA PALLIDUM: CPT | Performed by: OBSTETRICS & GYNECOLOGY

## 2022-07-23 PROCEDURE — 85027 COMPLETE CBC AUTOMATED: CPT | Performed by: OBSTETRICS & GYNECOLOGY

## 2022-07-23 PROCEDURE — 120N000001 HC R&B MED SURG/OB

## 2022-07-23 PROCEDURE — G0463 HOSPITAL OUTPT CLINIC VISIT: HCPCS | Mod: 25

## 2022-07-23 PROCEDURE — 36415 COLL VENOUS BLD VENIPUNCTURE: CPT | Performed by: OBSTETRICS & GYNECOLOGY

## 2022-07-23 PROCEDURE — 250N000011 HC RX IP 250 OP 636: Performed by: ANESTHESIOLOGY

## 2022-07-23 PROCEDURE — 258N000003 HC RX IP 258 OP 636: Performed by: OBSTETRICS & GYNECOLOGY

## 2022-07-23 PROCEDURE — 370N000003 HC ANESTHESIA WARD SERVICE

## 2022-07-23 PROCEDURE — 59025 FETAL NON-STRESS TEST: CPT

## 2022-07-23 PROCEDURE — 86850 RBC ANTIBODY SCREEN: CPT | Performed by: OBSTETRICS & GYNECOLOGY

## 2022-07-23 RX ORDER — LIDOCAINE 40 MG/G
CREAM TOPICAL
Status: DISCONTINUED | OUTPATIENT
Start: 2022-07-23 | End: 2022-07-23 | Stop reason: HOSPADM

## 2022-07-23 RX ORDER — KETOROLAC TROMETHAMINE 30 MG/ML
30 INJECTION, SOLUTION INTRAMUSCULAR; INTRAVENOUS
Status: DISCONTINUED | OUTPATIENT
Start: 2022-07-23 | End: 2022-07-24

## 2022-07-23 RX ORDER — CITRIC ACID/SODIUM CITRATE 334-500MG
30 SOLUTION, ORAL ORAL
Status: DISCONTINUED | OUTPATIENT
Start: 2022-07-23 | End: 2022-07-24

## 2022-07-23 RX ORDER — OXYTOCIN/0.9 % SODIUM CHLORIDE 30/500 ML
340 PLASTIC BAG, INJECTION (ML) INTRAVENOUS CONTINUOUS PRN
Status: DISCONTINUED | OUTPATIENT
Start: 2022-07-23 | End: 2022-07-24

## 2022-07-23 RX ORDER — METOCLOPRAMIDE 10 MG/1
10 TABLET ORAL EVERY 6 HOURS PRN
Status: DISCONTINUED | OUTPATIENT
Start: 2022-07-23 | End: 2022-07-24

## 2022-07-23 RX ORDER — ROPIVACAINE HYDROCHLORIDE 2 MG/ML
10 INJECTION, SOLUTION EPIDURAL; INFILTRATION; PERINEURAL ONCE
Status: DISCONTINUED | OUTPATIENT
Start: 2022-07-23 | End: 2022-07-24

## 2022-07-23 RX ORDER — OXYTOCIN 10 [USP'U]/ML
10 INJECTION, SOLUTION INTRAMUSCULAR; INTRAVENOUS
Status: DISCONTINUED | OUTPATIENT
Start: 2022-07-23 | End: 2022-07-24

## 2022-07-23 RX ORDER — PROCHLORPERAZINE MALEATE 5 MG
10 TABLET ORAL EVERY 6 HOURS PRN
Status: DISCONTINUED | OUTPATIENT
Start: 2022-07-23 | End: 2022-07-24

## 2022-07-23 RX ORDER — NALOXONE HYDROCHLORIDE 0.4 MG/ML
0.4 INJECTION, SOLUTION INTRAMUSCULAR; INTRAVENOUS; SUBCUTANEOUS
Status: DISCONTINUED | OUTPATIENT
Start: 2022-07-23 | End: 2022-07-24

## 2022-07-23 RX ORDER — FENTANYL CITRATE 50 UG/ML
100 INJECTION, SOLUTION INTRAMUSCULAR; INTRAVENOUS ONCE
Status: DISCONTINUED | OUTPATIENT
Start: 2022-07-23 | End: 2022-07-24

## 2022-07-23 RX ORDER — ONDANSETRON 4 MG/1
4 TABLET, ORALLY DISINTEGRATING ORAL EVERY 6 HOURS PRN
Status: DISCONTINUED | OUTPATIENT
Start: 2022-07-23 | End: 2022-07-24

## 2022-07-23 RX ORDER — OXYTOCIN/0.9 % SODIUM CHLORIDE 30/500 ML
100-340 PLASTIC BAG, INJECTION (ML) INTRAVENOUS CONTINUOUS PRN
Status: DISCONTINUED | OUTPATIENT
Start: 2022-07-23 | End: 2022-07-24

## 2022-07-23 RX ORDER — FAMOTIDINE 10 MG
10 TABLET ORAL 2 TIMES DAILY
COMMUNITY

## 2022-07-23 RX ORDER — MISOPROSTOL 200 UG/1
400 TABLET ORAL
Status: DISCONTINUED | OUTPATIENT
Start: 2022-07-23 | End: 2022-07-24

## 2022-07-23 RX ORDER — METOCLOPRAMIDE HYDROCHLORIDE 5 MG/ML
10 INJECTION INTRAMUSCULAR; INTRAVENOUS EVERY 6 HOURS PRN
Status: DISCONTINUED | OUTPATIENT
Start: 2022-07-23 | End: 2022-07-24

## 2022-07-23 RX ORDER — FENTANYL CITRATE 50 UG/ML
100 INJECTION, SOLUTION INTRAMUSCULAR; INTRAVENOUS
Status: DISCONTINUED | OUTPATIENT
Start: 2022-07-23 | End: 2022-07-24

## 2022-07-23 RX ORDER — ONDANSETRON 2 MG/ML
4 INJECTION INTRAMUSCULAR; INTRAVENOUS EVERY 6 HOURS PRN
Status: DISCONTINUED | OUTPATIENT
Start: 2022-07-23 | End: 2022-07-24

## 2022-07-23 RX ORDER — EPHEDRINE SULFATE 50 MG/ML
5 INJECTION, SOLUTION INTRAMUSCULAR; INTRAVENOUS; SUBCUTANEOUS
Status: DISCONTINUED | OUTPATIENT
Start: 2022-07-23 | End: 2022-07-24

## 2022-07-23 RX ORDER — CARBOPROST TROMETHAMINE 250 UG/ML
250 INJECTION, SOLUTION INTRAMUSCULAR
Status: DISCONTINUED | OUTPATIENT
Start: 2022-07-23 | End: 2022-07-24

## 2022-07-23 RX ORDER — TRANEXAMIC ACID 10 MG/ML
1 INJECTION, SOLUTION INTRAVENOUS EVERY 30 MIN PRN
Status: DISCONTINUED | OUTPATIENT
Start: 2022-07-23 | End: 2022-07-24

## 2022-07-23 RX ORDER — MISOPROSTOL 200 UG/1
800 TABLET ORAL
Status: DISCONTINUED | OUTPATIENT
Start: 2022-07-23 | End: 2022-07-24

## 2022-07-23 RX ORDER — PROCHLORPERAZINE 25 MG
25 SUPPOSITORY, RECTAL RECTAL EVERY 12 HOURS PRN
Status: DISCONTINUED | OUTPATIENT
Start: 2022-07-23 | End: 2022-07-24

## 2022-07-23 RX ORDER — METHYLERGONOVINE MALEATE 0.2 MG/ML
200 INJECTION INTRAVENOUS
Status: DISCONTINUED | OUTPATIENT
Start: 2022-07-23 | End: 2022-07-24

## 2022-07-23 RX ORDER — NALOXONE HYDROCHLORIDE 0.4 MG/ML
0.2 INJECTION, SOLUTION INTRAMUSCULAR; INTRAVENOUS; SUBCUTANEOUS
Status: DISCONTINUED | OUTPATIENT
Start: 2022-07-23 | End: 2022-07-24

## 2022-07-23 RX ORDER — SODIUM CHLORIDE, SODIUM LACTATE, POTASSIUM CHLORIDE, CALCIUM CHLORIDE 600; 310; 30; 20 MG/100ML; MG/100ML; MG/100ML; MG/100ML
INJECTION, SOLUTION INTRAVENOUS CONTINUOUS
Status: DISCONTINUED | OUTPATIENT
Start: 2022-07-23 | End: 2022-07-24

## 2022-07-23 RX ORDER — IBUPROFEN 400 MG/1
800 TABLET, FILM COATED ORAL
Status: DISCONTINUED | OUTPATIENT
Start: 2022-07-23 | End: 2022-07-24

## 2022-07-23 RX ORDER — NALBUPHINE HYDROCHLORIDE 10 MG/ML
2.5-5 INJECTION, SOLUTION INTRAMUSCULAR; INTRAVENOUS; SUBCUTANEOUS EVERY 6 HOURS PRN
Status: DISCONTINUED | OUTPATIENT
Start: 2022-07-23 | End: 2022-07-24

## 2022-07-23 RX ORDER — ACETAMINOPHEN 325 MG/1
650 TABLET ORAL EVERY 4 HOURS PRN
Status: DISCONTINUED | OUTPATIENT
Start: 2022-07-23 | End: 2022-07-24

## 2022-07-23 RX ORDER — CALCIUM CARBONATE 500 MG/1
1 TABLET, CHEWABLE ORAL 2 TIMES DAILY
COMMUNITY
End: 2023-12-19

## 2022-07-23 RX ADMIN — ROPIVACAINE HYDROCHLORIDE 10 ML: 2 INJECTION, SOLUTION EPIDURAL; INFILTRATION at 23:32

## 2022-07-23 RX ADMIN — SODIUM CHLORIDE, POTASSIUM CHLORIDE, SODIUM LACTATE AND CALCIUM CHLORIDE 1000 ML: 600; 310; 30; 20 INJECTION, SOLUTION INTRAVENOUS at 22:38

## 2022-07-23 RX ADMIN — FENTANYL CITRATE 100 MCG: 50 INJECTION, SOLUTION INTRAMUSCULAR; INTRAVENOUS at 23:32

## 2022-07-23 ASSESSMENT — ACTIVITIES OF DAILY LIVING (ADL)
TOILETING_ISSUES: NO
ADLS_ACUITY_SCORE: 18
WEAR_GLASSES_OR_BLIND: NO
DIFFICULTY_EATING/SWALLOWING: NO
CONCENTRATING,_REMEMBERING_OR_MAKING_DECISIONS_DIFFICULTY: NO
WALKING_OR_CLIMBING_STAIRS_DIFFICULTY: NO
DRESSING/BATHING_DIFFICULTY: NO
CHANGE_IN_FUNCTIONAL_STATUS_SINCE_ONSET_OF_CURRENT_ILLNESS/INJURY: NO
DOING_ERRANDS_INDEPENDENTLY_DIFFICULTY: NO
FALL_HISTORY_WITHIN_LAST_SIX_MONTHS: NO

## 2022-07-23 ASSESSMENT — COLUMBIA-SUICIDE SEVERITY RATING SCALE - C-SSRS
6. HAVE YOU EVER DONE ANYTHING, STARTED TO DO ANYTHING, OR PREPARED TO DO ANYTHING TO END YOUR LIFE?: NO
3. HAVE YOU BEEN THINKING ABOUT HOW YOU MIGHT KILL YOURSELF?: NO
4. HAVE YOU HAD THESE THOUGHTS AND HAD SOME INTENTION OF ACTING ON THEM?: NO
1. IN THE PAST MONTH, HAVE YOU WISHED YOU WERE DEAD OR WISHED YOU COULD GO TO SLEEP AND NOT WAKE UP?: NO
2. HAVE YOU ACTUALLY HAD ANY THOUGHTS OF KILLING YOURSELF IN THE PAST MONTH?: NO
5. HAVE YOU STARTED TO WORK OUT OR WORKED OUT THE DETAILS OF HOW TO KILL YOURSELF? DO YOU INTEND TO CARRY OUT THIS PLAN?: NO

## 2022-07-24 LAB — T PALLIDUM AB SER QL: NONREACTIVE

## 2022-07-24 PROCEDURE — 00HU33Z INSERTION OF INFUSION DEVICE INTO SPINAL CANAL, PERCUTANEOUS APPROACH: ICD-10-PCS | Performed by: ANESTHESIOLOGY

## 2022-07-24 PROCEDURE — 120N000012 HC R&B POSTPARTUM

## 2022-07-24 PROCEDURE — 999N000016 HC STATISTIC ATTENDANCE AT DELIVERY

## 2022-07-24 PROCEDURE — 59400 OBSTETRICAL CARE: CPT | Performed by: OBSTETRICS & GYNECOLOGY

## 2022-07-24 PROCEDURE — 250N000011 HC RX IP 250 OP 636: Performed by: ANESTHESIOLOGY

## 2022-07-24 PROCEDURE — 250N000013 HC RX MED GY IP 250 OP 250 PS 637: Performed by: OBSTETRICS & GYNECOLOGY

## 2022-07-24 PROCEDURE — 0HQ9XZZ REPAIR PERINEUM SKIN, EXTERNAL APPROACH: ICD-10-PCS | Performed by: OBSTETRICS & GYNECOLOGY

## 2022-07-24 PROCEDURE — 722N000001 HC LABOR CARE VAGINAL DELIVERY SINGLE

## 2022-07-24 PROCEDURE — 250N000009 HC RX 250: Performed by: OBSTETRICS & GYNECOLOGY

## 2022-07-24 PROCEDURE — 3E0R3BZ INTRODUCTION OF ANESTHETIC AGENT INTO SPINAL CANAL, PERCUTANEOUS APPROACH: ICD-10-PCS | Performed by: ANESTHESIOLOGY

## 2022-07-24 PROCEDURE — 258N000003 HC RX IP 258 OP 636: Performed by: OBSTETRICS & GYNECOLOGY

## 2022-07-24 RX ORDER — HYDROCORTISONE 25 MG/G
CREAM TOPICAL 3 TIMES DAILY PRN
Status: DISCONTINUED | OUTPATIENT
Start: 2022-07-24 | End: 2022-07-26 | Stop reason: HOSPADM

## 2022-07-24 RX ORDER — LIDOCAINE HYDROCHLORIDE 10 MG/ML
INJECTION, SOLUTION EPIDURAL; INFILTRATION; INTRACAUDAL; PERINEURAL
Status: DISCONTINUED
Start: 2022-07-24 | End: 2022-07-24 | Stop reason: HOSPADM

## 2022-07-24 RX ORDER — CARBOPROST TROMETHAMINE 250 UG/ML
250 INJECTION, SOLUTION INTRAMUSCULAR
Status: DISCONTINUED | OUTPATIENT
Start: 2022-07-24 | End: 2022-07-26 | Stop reason: HOSPADM

## 2022-07-24 RX ORDER — OXYTOCIN/0.9 % SODIUM CHLORIDE 30/500 ML
340 PLASTIC BAG, INJECTION (ML) INTRAVENOUS CONTINUOUS PRN
Status: DISCONTINUED | OUTPATIENT
Start: 2022-07-24 | End: 2022-07-26 | Stop reason: HOSPADM

## 2022-07-24 RX ORDER — ACETAMINOPHEN 325 MG/1
650 TABLET ORAL EVERY 4 HOURS PRN
Status: DISCONTINUED | OUTPATIENT
Start: 2022-07-24 | End: 2022-07-26 | Stop reason: HOSPADM

## 2022-07-24 RX ORDER — MISOPROSTOL 200 UG/1
400 TABLET ORAL
Status: DISCONTINUED | OUTPATIENT
Start: 2022-07-24 | End: 2022-07-26 | Stop reason: HOSPADM

## 2022-07-24 RX ORDER — FENTANYL CITRATE 50 UG/ML
INJECTION, SOLUTION INTRAMUSCULAR; INTRAVENOUS
Status: COMPLETED | OUTPATIENT
Start: 2022-07-23 | End: 2022-07-23

## 2022-07-24 RX ORDER — TRANEXAMIC ACID 10 MG/ML
1 INJECTION, SOLUTION INTRAVENOUS EVERY 30 MIN PRN
Status: DISCONTINUED | OUTPATIENT
Start: 2022-07-24 | End: 2022-07-26 | Stop reason: HOSPADM

## 2022-07-24 RX ORDER — OXYTOCIN 10 [USP'U]/ML
10 INJECTION, SOLUTION INTRAMUSCULAR; INTRAVENOUS
Status: DISCONTINUED | OUTPATIENT
Start: 2022-07-24 | End: 2022-07-26 | Stop reason: HOSPADM

## 2022-07-24 RX ORDER — BISACODYL 10 MG
10 SUPPOSITORY, RECTAL RECTAL DAILY PRN
Status: DISCONTINUED | OUTPATIENT
Start: 2022-07-24 | End: 2022-07-26 | Stop reason: HOSPADM

## 2022-07-24 RX ORDER — ROPIVACAINE HYDROCHLORIDE 2 MG/ML
INJECTION, SOLUTION EPIDURAL; INFILTRATION; PERINEURAL
Status: COMPLETED | OUTPATIENT
Start: 2022-07-23 | End: 2022-07-23

## 2022-07-24 RX ORDER — DOCUSATE SODIUM 100 MG/1
100 CAPSULE, LIQUID FILLED ORAL DAILY
Status: DISCONTINUED | OUTPATIENT
Start: 2022-07-24 | End: 2022-07-26 | Stop reason: HOSPADM

## 2022-07-24 RX ORDER — IBUPROFEN 400 MG/1
800 TABLET, FILM COATED ORAL EVERY 6 HOURS PRN
Status: DISCONTINUED | OUTPATIENT
Start: 2022-07-24 | End: 2022-07-26 | Stop reason: HOSPADM

## 2022-07-24 RX ORDER — MISOPROSTOL 200 UG/1
800 TABLET ORAL
Status: DISCONTINUED | OUTPATIENT
Start: 2022-07-24 | End: 2022-07-26 | Stop reason: HOSPADM

## 2022-07-24 RX ORDER — METHYLERGONOVINE MALEATE 0.2 MG/ML
200 INJECTION INTRAVENOUS
Status: DISCONTINUED | OUTPATIENT
Start: 2022-07-24 | End: 2022-07-26 | Stop reason: HOSPADM

## 2022-07-24 RX ORDER — MODIFIED LANOLIN
OINTMENT (GRAM) TOPICAL
Status: DISCONTINUED | OUTPATIENT
Start: 2022-07-24 | End: 2022-07-26 | Stop reason: HOSPADM

## 2022-07-24 RX ADMIN — ACETAMINOPHEN 650 MG: 325 TABLET ORAL at 23:41

## 2022-07-24 RX ADMIN — DOCUSATE SODIUM 100 MG: 100 CAPSULE, LIQUID FILLED ORAL at 09:52

## 2022-07-24 RX ADMIN — Medication 12 ML/HR: at 00:08

## 2022-07-24 RX ADMIN — IBUPROFEN 800 MG: 400 TABLET, FILM COATED ORAL at 02:46

## 2022-07-24 RX ADMIN — SODIUM CHLORIDE, POTASSIUM CHLORIDE, SODIUM LACTATE AND CALCIUM CHLORIDE: 600; 310; 30; 20 INJECTION, SOLUTION INTRAVENOUS at 00:01

## 2022-07-24 RX ADMIN — Medication 340 ML/HR: at 01:21

## 2022-07-24 RX ADMIN — ACETAMINOPHEN 650 MG: 325 TABLET ORAL at 18:50

## 2022-07-24 RX ADMIN — IBUPROFEN 800 MG: 400 TABLET, FILM COATED ORAL at 09:52

## 2022-07-24 RX ADMIN — IBUPROFEN 800 MG: 400 TABLET, FILM COATED ORAL at 22:11

## 2022-07-24 RX ADMIN — ACETAMINOPHEN 650 MG: 325 TABLET ORAL at 02:47

## 2022-07-24 RX ADMIN — LIDOCAINE HYDROCHLORIDE 20 ML: 10 INJECTION, SOLUTION EPIDURAL; INFILTRATION; INTRACAUDAL; PERINEURAL at 01:18

## 2022-07-24 RX ADMIN — ACETAMINOPHEN 650 MG: 325 TABLET ORAL at 14:10

## 2022-07-24 RX ADMIN — ACETAMINOPHEN 650 MG: 325 TABLET ORAL at 09:52

## 2022-07-24 RX ADMIN — IBUPROFEN 800 MG: 400 TABLET, FILM COATED ORAL at 16:25

## 2022-07-24 ASSESSMENT — ACTIVITIES OF DAILY LIVING (ADL)
ADLS_ACUITY_SCORE: 21
ADLS_ACUITY_SCORE: 21
ADLS_ACUITY_SCORE: 18
ADLS_ACUITY_SCORE: 21
ADLS_ACUITY_SCORE: 18
ADLS_ACUITY_SCORE: 21
ADLS_ACUITY_SCORE: 18
ADLS_ACUITY_SCORE: 21
ADLS_ACUITY_SCORE: 18
ADLS_ACUITY_SCORE: 18
ADLS_ACUITY_SCORE: 21
ADLS_ACUITY_SCORE: 18

## 2022-07-24 NOTE — PLAN OF CARE
Vital signs stable, afebrile, voiding well, ice and tucks to perineum prn, FF@U small rubra lochia, able to ambulate without dizziness, trace dependent edema, able to rest, pain well controlled with medications, IV SL'd, breast feeding  skin-to-skin on demand with RN assist.  is here and is supportive.

## 2022-07-24 NOTE — L&D DELIVERY NOTE
of viable female at 0112   Baby's weight not yet recorded     Apgars 8, 9     Placenta delivered intact with trailing membranes   Bilateral labial lacerations repaired    Primary OB: Mattson

## 2022-07-24 NOTE — PLAN OF CARE
Patient admitted into room 411 per wheelchair accompanied by , , and L&D RN. Oriented to room, call light, safety measures, and postpartum routines. Verified  bands with parents and L&D RN. Plan of care and teaching initiated, answered questions.

## 2022-07-24 NOTE — ANESTHESIA PROCEDURE NOTES
Epidural catheter Procedure Note    Pre-Procedure   Staff -        Anesthesiologist:  Hannah Plata MD       Performed By: anesthesiologist       Location: OB       Pre-Anesthestic Checklist: patient identified, risks and benefits discussed, informed consent, pre-op evaluation and at physician/surgeon's request  Timeout:       Correct Patient: Yes        Correct Procedure: Yes        Correct Site: Yes        Correct Position: Yes   Procedure Documentation  Procedure: epidural catheter       Diagnosis: labor pain       Patient Position: sitting       Patient Prep/Sterile Barriers: sterile gloves, mask, patient draped       Skin prep: Betadine       Local skin infiltrated with 3 mL of 1% lidocaine.        Insertion Site: L3-4. (midline approach).       Technique: LORT saline        Needle Type: Touhy needle       Needle Gauge: 17.        Needle Length (Inches): 3.5        Catheter: 19 G.          Catheter threaded easily.         3 cm epidural space.           # of attempts: 1 and  # of redirects:     Assessment/Narrative         Paresthesias: No.       Test dose of 3 mL lidocaine 1.5% w/ 1:200,000 epinephrine at.         Test dose negative, 3 minutes after injection, for signs of intravascular, subdural, or intrathecal injection.       Insertion/Infusion Method: LORT saline       Aspiration negative for Heme or CSF via Epidural Catheter.    Medication(s) Administered   0.2% Ropivacaine (Epidural) - EPIDURAL   10 mL - 7/23/2022 11:32:00 PM  Fentanyl PF (Epidural) - EPIDURAL   100 mcg - 7/23/2022 11:32:00 PM   Comments:  No blood or complications.  Secured with adhesive spray, tegaderm, and tape.    Orders to manage the epidural infusion have been entered and, through coordination with the nurse, we will continue to manage and monitor the patient's labor epidural. We will continuously be available to adjust as needed throughout the entire labor and delivery process.

## 2022-07-24 NOTE — TELEPHONE ENCOUNTER
"OB Triage Call      Is patient's delivering hospital on divert? No    39w5d    Estimated Date of Delivery: 2022        Contractions started at 6 p.  Now 5 minutes apart lasting for 55 seconds.  Has been having light bleeding today. Water has not broken. Writer called report to Levar Choudhary.  Patient will leave for the hospital now  will drive.      OB History    Para Term  AB Living   3 0 0 0 2 0   SAB IAB Ectopic Multiple Live Births   2 0 0 0 0      # Outcome Date GA Lbr Glen[place]/2nd Weight Sex Delivery Anes PTL Lv   3 Current            2 2021           1 2020               No results found for: GBS       Cailin Ortiz RN 22 8:17 PM  [facility] Nurse Advisor    Reason for Disposition    [1] First baby (primipara) AND [2] contractions < 6 minutes apart  AND [3] present 2 hours    Additional Information    Negative: Passed out (i.e., lost consciousness, collapsed and was not responding)    Negative: Shock suspected (e.g., cold/pale/clammy skin, too weak to stand, low BP, rapid pulse)    Negative: Difficult to awaken or acting confused (e.g., disoriented, slurred speech)    Negative: [1] SEVERE abdominal pain (e.g., excruciating) AND [2] constant AND [3] present > 1 hour    Negative: Severe bleeding (e.g., continuous red blood from vagina, or large blood clots)    Negative: Umbilical cord hanging out of the vagina (shiny, white, curled appearance, \"like telephone cord\")    Negative: Uncontrollable urge to push (i.e., feels like baby is coming out now)    Negative: Can see baby    Negative: Sounds like a life-threatening emergency to the triager    Protocols used: PREGNANCY - LABOR-A-AH      "

## 2022-07-24 NOTE — PLAN OF CARE
Doing well,vss,voiding with out difficulty,pain control with tylenol&ibuprofen,working on breast feeding.Will continue to monitor.

## 2022-07-24 NOTE — L&D DELIVERY NOTE
Delivery Date: 2022    Kaila is a 37-year-old G3, P0-0-2-0, who presented to Labor and Delivery at 39+5 weeks' gestation with spontaneous rupture of membranes.  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  Prenatal laboratories include blood type A positive, rubella status immune, Glucola within normal limits and GBS negative.  With advanced maternal age, Jose L did opt for genetic screening and had normal [employer] testing, normal alpha protein testing, and normal level 2 ultrasound.  Estimated fetal weight 7-1/2 pounds.    On arrival to Labor and Delivery, Jose L was found to be spontaneously ruptured with meconium stained fluid.  Cervix was closed with surgical scar tissue appreciated, but completely effaced, and )-+1 station.  She was admitted and expectantly managed.  Fetal heart tones remained reactive with baseline in 130s with moderate variability and accelerations present.  Following epidural, Jose L was found to be complete and +1, and with significant maternal pressure began pushing immediately.  With excellent maternal effort, Jose L delivered a female infant in the right occiput anterior position at 0112 hours.  No nuchal cords were noted.  Remainder of infant was delivered and placed on maternal abdomen.   nurse practitioner was present due to meconium-stained fluid.  The baby was immediately vigorous and crying.      Placenta was delivered spontaneously and intact with trailing membranes removed manually.  Bilateral labial lacerations were repaired using a 3-0 Vicryl in a running fashion after injection with local anesthetic.  No perineal lacerations were sustained.  Fundus was firm and bleeding was minimal.  Both mother and infant were doing very well following delivery and are stable for recovery.    DELIVERY TIMES:    First stage of labor:  2 hours and 2  minutes.    Second stage of labor:  50 minutes.    Third stage of labor:  10 minutes.    FINAL DIAGNOSES:      1.  Spontaneous vaginal delivery of 39+6 week female infant with a weight not yet recorded and Apgars of 8 and 9.  2.  Epidural for pain control.  3.  Advanced maternal age.  4.  Bilateral labial lacerations.  5.  Maternal history of a cold knife cone biopsy.    Deanne Mattson MD        D: 2022   T: 2022   MT: [id number]    Name:     EDDIE GARCES  MRN:      [id number]        Account:       [id number]   :      1985           Delivery Date: 2022     Document: [id number]    cc:  Deanne Mattson MD
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
## 2022-07-24 NOTE — PLAN OF CARE
Data: Jose L Collazo transferred to [facility] via wheelchair at 0356. Baby transferred via parent's arms.  Action: Receiving unit notified of transfer: Yes. Patient and family notified of room change. Report given to Jolynn Church at 0422. Belongings sent to receiving unit. Accompanied by Registered Nurse. Oriented patient to surroundings. Call light within reach. ID bands double-checked with receiving RN.  Response: Patient tolerated transfer and is stable.

## 2022-07-24 NOTE — LACTATION NOTE
Initial visit with Jose L, IVETTE and baby.  Baby has latched and nurses have helped obtain gtts of colostrum.  Jose L had a breats reduction and Pump brought in to use post nursing sessions.    Breastfeeding general information reviewed.   Advised to breastfeed exclusively, on demand, avoid pacifiers, bottles and formula unless medically indicated.  Encouraged rooming in, skin to skin, feeding on demand 8-12x/day or sooner if baby cues.  Explained benefits of holding and skin to skin.  Encouraged lots of skin to skin. Instructed on hand expression.   Continues to nurse well per mom. No further questions at this time.   Will follow as needed.   Mary Hamm BSN, RN, PHN, RNC-MNN, IBCLC

## 2022-07-24 NOTE — ANESTHESIA POSTPROCEDURE EVALUATION
Patient: Jose L Collazo    Procedure: * No procedures listed *       Anesthesia Type:  Epidural    Note:  Disposition: Inpatient   Postop Pain Control: Uneventful            Sign Out: Well controlled pain   PONV: No   Neuro/Psych: Uneventful            Sign Out: Acceptable/Baseline neuro status   Airway/Respiratory: Uneventful            Sign Out: Acceptable/Baseline resp. status   CV/Hemodynamics: Uneventful            Sign Out: Acceptable CV status   Other NRE: NONE   DID A NON-ROUTINE EVENT OCCUR? No    Event details/Postop Comments:  Epidural has worn off without complications           Last vitals:  Vitals:    07/24/22 0333 07/24/22 0408 07/24/22 0745   BP: 127/70 119/75 116/82   Resp:  18 18   Temp:  36.7  C (98  F) 36.8  C (98.2  F)   SpO2:          Electronically Signed By: Hugo Barrios MD  July 24, 2022  4:35 PM
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
## 2022-07-24 NOTE — H&P
No significant change in general health status based on examination of the patient, review of Nursing Admission Database and prenatal record.  38yo  admitted to L&D at 39+5wks with SROM.  Meconium stained fluid.  Pregnancy complicated by AMA and maternal hx of AIS with 2 cold knife cone biopsies with resultant cervical scarring.  Cervix closed but completely effaced and 0+1 on admission.  Epidural desired.  Expectant management.    EFW: 7lb 8oz     Deanne Mattson MD

## 2022-07-24 NOTE — LACTATION NOTE
Routine visit. Set up and instructed on breast pump, yielded 3+ml , will give colostrum at next feeding.  Pumping due to history of breast reduction.  No further questions at this time. Mary Hamm BSN, RN, PHN, RNC-MNN, IBCLC

## 2022-07-25 LAB — HGB BLD-MCNC: 11.2 G/DL (ref 11.7–15.7)

## 2022-07-25 PROCEDURE — 120N000012 HC R&B POSTPARTUM

## 2022-07-25 PROCEDURE — 250N000013 HC RX MED GY IP 250 OP 250 PS 637: Performed by: OBSTETRICS & GYNECOLOGY

## 2022-07-25 PROCEDURE — 85018 HEMOGLOBIN: CPT | Performed by: OBSTETRICS & GYNECOLOGY

## 2022-07-25 PROCEDURE — 36415 COLL VENOUS BLD VENIPUNCTURE: CPT | Performed by: OBSTETRICS & GYNECOLOGY

## 2022-07-25 RX ORDER — ACETAMINOPHEN 325 MG/1
650 TABLET ORAL EVERY 4 HOURS PRN
COMMUNITY
Start: 2022-07-25 | End: 2023-12-19

## 2022-07-25 RX ORDER — DOCUSATE SODIUM 100 MG/1
100 CAPSULE, LIQUID FILLED ORAL DAILY
COMMUNITY
Start: 2022-07-25 | End: 2023-12-19

## 2022-07-25 RX ORDER — IBUPROFEN 800 MG/1
800 TABLET, FILM COATED ORAL EVERY 6 HOURS PRN
COMMUNITY
Start: 2022-07-25 | End: 2023-12-19

## 2022-07-25 RX ADMIN — IBUPROFEN 800 MG: 400 TABLET, FILM COATED ORAL at 12:11

## 2022-07-25 RX ADMIN — ACETAMINOPHEN 650 MG: 325 TABLET ORAL at 18:31

## 2022-07-25 RX ADMIN — ACETAMINOPHEN 650 MG: 325 TABLET ORAL at 04:27

## 2022-07-25 RX ADMIN — ACETAMINOPHEN 650 MG: 325 TABLET ORAL at 12:11

## 2022-07-25 RX ADMIN — IBUPROFEN 800 MG: 400 TABLET, FILM COATED ORAL at 04:27

## 2022-07-25 RX ADMIN — IBUPROFEN 800 MG: 400 TABLET, FILM COATED ORAL at 18:31

## 2022-07-25 RX ADMIN — DOCUSATE SODIUM 100 MG: 100 CAPSULE, LIQUID FILLED ORAL at 07:54

## 2022-07-25 ASSESSMENT — ACTIVITIES OF DAILY LIVING (ADL)
ADLS_ACUITY_SCORE: 18

## 2022-07-25 NOTE — LACTATION NOTE
"Lactation visit with Jose L, Her mother Funmi, and baby girl.    Infant is in her 2nd day of life but had a short NICU stay last night in regards to her CCHD testing. Jose L is frustrated because infant \"nursed really well until she went to the NICU and was given a pacifier.\" Infant was fed via finger feeding while in the NICU. LC listened and offered support for what happened. Also looking at infant's age, about 40 hours old, we discussed infant's feeding pattern today very typical for her age. Also suggesting infant likely to cluster feed overnight tonight, which would be typical second night behavior. Jose L is pumping d/t a history of a breast reduction and yielding good volume thus far. Suggested to continue to pump post breastfeeding while her milk supply is building. Suggesting Jose L follow-up with Lactation support (she shares she has connections for IBCLC's) to help continue to guide her pumping needs and help with weaning from the nipple shield.     Highlighted  breastfeeding basics:   1) Watch for early feeding cues (licking lips, stirring or rooting, sucking movement with mouth, hands to mouth) and always breast feed on DEMAND.  2) Infant should breastfeed a minimum of 8 times in 24 hours. If it has been 3 hours since last breast feeding session, un-swaddle infant and begin skin to skin to entice infant to nurse.    Reviewed breast feeding section in our \"Guide to Postpartum and  Care.\"     Discussed physiology of milk production from colostrum through milk coming in and how the breasts should begin to feel \"heavy or full\" between day 3-5.  Discussed normal infant weight loss and when infant should be back to birth weight.     Appreciative of visit.    Kayce Richardson RN, IBCLC            "

## 2022-07-25 NOTE — PLAN OF CARE
Jose L doing well today; glad that infant transferred back to room from NICU. Minimal c/o discomfort. Using tucks pads and tylenol and ibuprofen when able to. Continues to work on breastfeeding; pumping after each feeding session and yielding 5-10ml. Supportive significant other and mother at bedside throughout the day.

## 2022-07-25 NOTE — CARE PLAN
Vital signs stable. Postpartum assessment WDL. Pain controlled with tylenol ibuprofen. Patient voiding without difficulty. Breastfeeding . Patient and infant bonding well infant in nicu for monitor low heart rates. Will continue with current plan of care.

## 2022-07-25 NOTE — PROGRESS NOTES
[facility] Obstetrics Post-Partum Progress Note          Assessment and Plan:    Assessment:   Post-partum day #1  Normal spontaneous vaginal delivery  L&D complications: None      Doing well.  No excessive bleeding  Pain well-controlled.   in NICU due to bradycardia and desaturations with feedings      Plan:   Defer discharge today. Does not need Rx for home  Anticipate discharge to home on PPD#2           Interval History:   Doing well. Bleeding is less than menses. Pain is well controlled.          Significant Problems:    None          Review of Systems:    The patient denies any chest pain, shortness of breath, excessive pain, fever, chills, purulent drainage from the wound, nausea or vomiting.          Medications:     Current Facility-Administered Medications   Medication     acetaminophen (TYLENOL) tablet 650 mg     benzocaine (AMERICAINE) 20 % topical spray     bisacodyl (DULCOLAX) suppository 10 mg     carboprost (HEMABATE) injection 250 mcg     docusate sodium (COLACE) capsule 100 mg     hydrocortisone (Perianal) (ANUSOL-HC) 2.5 % cream     ibuprofen (ADVIL/MOTRIN) tablet 800 mg     lanolin cream     methylergonovine (METHERGINE) injection 200 mcg     misoprostol (CYTOTEC) tablet 400 mcg    Or     misoprostol (CYTOTEC) tablet 800 mcg     No MMR Needed - Assessment: Patient does not need MMR vaccine     No Tdap Needed - Assessment: Patient does not need Tdap vaccine     oxytocin (PITOCIN) 30 units in 500 mL 0.9% NaCl infusion     oxytocin (PITOCIN) injection 10 Units     tranexamic acid 1 g in 100 mL NS IV bag (premix)             Physical Exam:     Patient Vitals for the past 24 hrs:   BP Temp Temp src Pulse Resp   22 0752 120/74 98.2  F (36.8  C) Oral 72 --   22 2211 109/66 97.9  F (36.6  C) Oral 82 16   22 111/70 98.1  F (36.7  C) Oral 79 16   22 1625 114/78 98.1  F (36.7  C) Oral 83 16     Uterine fundus is firm, non-tender and at the level of the  umbilicus          Data:     Hemoglobin   Date Value Ref Range Status   07/25/2022 11.2 (L) 11.7 - 15.7 g/dL Final   07/23/2022 12.2 11.7 - 15.7 g/dL Final   04/26/2022 12.1 11.7 - 15.7 g/dL Final   12/17/2021 13.2 11.7 - 15.7 g/dL Final   01/23/2019 13.3 11.7 - 15.7 g/dL Final   01/02/2019 14.0 11.7 - 15.7 g/dL Final   07/21/2017 12.8 11.7 - 15.7 g/dL Final   06/03/2015 11.1 (L) 11.7 - 15.7 g/dL Final     -    Malissa Felix MD

## 2022-07-26 VITALS
BODY MASS INDEX: 32.58 KG/M2 | DIASTOLIC BLOOD PRESSURE: 71 MMHG | HEIGHT: 68 IN | OXYGEN SATURATION: 97 % | RESPIRATION RATE: 16 BRPM | SYSTOLIC BLOOD PRESSURE: 125 MMHG | WEIGHT: 215 LBS | TEMPERATURE: 98 F | HEART RATE: 76 BPM

## 2022-07-26 PROCEDURE — 250N000013 HC RX MED GY IP 250 OP 250 PS 637: Performed by: OBSTETRICS & GYNECOLOGY

## 2022-07-26 RX ADMIN — ACETAMINOPHEN 650 MG: 325 TABLET ORAL at 07:49

## 2022-07-26 RX ADMIN — IBUPROFEN 800 MG: 400 TABLET, FILM COATED ORAL at 07:49

## 2022-07-26 RX ADMIN — BENZOCAINE: 11.4 AEROSOL, SPRAY TOPICAL at 07:49

## 2022-07-26 RX ADMIN — DOCUSATE SODIUM 100 MG: 100 CAPSULE, LIQUID FILLED ORAL at 07:49

## 2022-07-26 RX ADMIN — IBUPROFEN 800 MG: 400 TABLET, FILM COATED ORAL at 00:08

## 2022-07-26 RX ADMIN — ACETAMINOPHEN 650 MG: 325 TABLET ORAL at 00:08

## 2022-07-26 ASSESSMENT — ACTIVITIES OF DAILY LIVING (ADL)
ADLS_ACUITY_SCORE: 18

## 2022-07-26 NOTE — PROGRESS NOTES
[facility]    Post-Partum Progress Note    Assessment & Plan   Assessment:  Post-partum day #2  Normal spontaneous vaginal delivery    Doing well.  No excessive bleeding  Pain well-controlled.    Plan:  Ambulation encouraged  Lactation consultation  Pain control measures as needed  Reportable signs and symptoms dicussed with the patient  Discharge later today  Follow up with me in 6 weeks    Deanne Mattson MD     Interval History   Doing well.  Pain is well-controlled --sore bottom but minimal cramping.  No fevers.  No history of foul-smelling vaginal discharge.  Good appetite.  Denies chest pain, shortness of breath, nausea or vomiting.  Vaginal bleeding is similar to a menstrual flow.  Ambulatory.  Breastfeeding well.  Baby in NICU yesterday for bradycardia episode --back upstairs last night and doing well    Medications     - MEDICATION INSTRUCTIONS -       - MEDICATION INSTRUCTIONS -       oxytocin in 0.9% NaCl         docusate sodium  100 mg Oral Daily       Physical Exam   Temp: 98.2  F (36.8  C) Temp src: Oral BP: 122/76 Pulse: 81   Resp: 18        Vitals:    07/23/22 2120   Weight: 97.5 kg (215 lb)     Vital Signs with Ranges  Temp:  [98  F (36.7  C)-98.4  F (36.9  C)] 98.2  F (36.8  C)  Pulse:  [81] 81  Resp:  [16-18] 18  BP: (118-130)/(71-81) 122/76  No intake/output data recorded.    Uterine fundus is firm, non-tender and at the level of the umbilicus  Extremities Non-tender    Data   Recent Labs   Lab Test 07/23/22  2220   AS Negative     Recent Labs   Lab Test 07/25/22  0754 07/23/22  2220   HGB 11.2* 12.2     Recent Labs   Lab Test 12/17/21  1559   RUQIGG Positive*

## 2022-07-26 NOTE — PLAN OF CARE
Vital signs stable. Patient voiding without difficulty. Able to ambulate in room free of dizziness. Taking tylenol/ibuprofen for pain management. Breastfeeding infant every 2-3 hours. Planning on discharging home today. Discharge instructions/follow up discuseed. Questions/concerns addressed.

## 2022-09-06 PROBLEM — Z36.89 ENCOUNTER FOR TRIAGE IN PREGNANT PATIENT: Status: RESOLVED | Noted: 2022-07-23 | Resolved: 2022-09-06

## 2022-09-06 NOTE — PROGRESS NOTES
SUBJECTIVE:  Jose L Collazo,  is here for a postpartum visit.  She had a  on  delivering a healthy baby girl, named Katie weighing 7 lbs 2 oz at term.      HPI:  Here today for postpartum visit --doing great!  Baby Katie has been very good baby --great eater and sleeper.  Exclusively breast feeding and has been able to pump extra for  nephew as well!  Usually going 2-4hrs between nighttime feedings.  Jose L has done very well --bleeding stopped after 3-4wks.  Eating/drinking well.  No bowel issues.  Some slight leaking with cough/sneeze but getting better.  Has not resumed SA.  Would like to have #2 sooner than later but will do minipill for the first few months.  Has used IUD in the past  Hx AIS --s/p CKC x 2 in 2019 --due for co-testing today  Moods good --denies depression or anxiety issues  Returns to work in October but also has option for extended leave beyond that without pay if desired      Last PHQ-9 score on record=   PHQ-9 SCORE 2022   PHQ-9 Total Score MyChart -   PHQ-9 Total Score 10     ANA-7 SCORE 2020 12/15/2021 2022   Total Score - 2 (minimal anxiety) -   Total Score 3 2 7       Pap:   Lab Results   Component Value Date    PAP NIL 2020    PAP NIL 2020    PAP NIL 10/16/2019    2020    Delivery complications:  No  Breast feeding:  Yes  Bladder problems:  Yes, some bladder leakage  Bowel problems/hemorrhoids:  No  Episiotomy/laceration/incision healed? Yes  Vaginal flow:  None  [place]:  No  Contraception: unsure  Emotional adjustment:  doing well and happy  Back to work:  Back to work but can work from home    12 point review of systems negative other than symptoms noted below or in the HPI.  Genitourinary: Incontinence    OBJECTIVE:  Vitals: Wt 90.3 kg (199 lb)   LMP 10/18/2021   Breastfeeding Yes   BMI 30.26 kg/m    BMI= Body mass index is 30.26 kg/m .  General - pleasant female in no acute distress.  Breast -   deferred  Abdomen - No incision  Pelvic - EG: normal adult female, BUS: within normal limits, Vagina: well rugated, no discharge, Cervix: no lesions or CMT, Uterus: firm, normal sized and nontender, anteverted in position. Adnexae: no masses or tenderness.  Rectovaginal - deferred.    ASSESSMENT:    ICD-10-CM    1. Encounter for postpartum visit  Z39.2 Pap thin layer screen with HPV - recommended age 30 - 65 years   2. Oral contraception initiation  Z30.011 norethindrone (MICRONOR) 0.35 MG tablet       PLAN:  May resume normal activities without restrictions.  Pap smear was done today.    Full counseling was provided, and all questions were answered.   Return to clinic in one year for an annual visit.     Patient Instructions   Follow up with your primary care provider for your other medical problems.  Continue self breast exam.  Increase physical activity and exercise.  Lab and pap smear results will be called to the patient.  Co-testing done today due to history of AIS.    Usual safety and preventative measures counseling done.  BMI >25  Weight loss encouraged.   Will start minipill for contraception while breast feeding.  Recommend delaying further child-bearing for minimum of 6 months.    Deanne Mattson MD

## 2022-09-08 ENCOUNTER — PRENATAL OFFICE VISIT (OUTPATIENT)
Dept: OBGYN | Facility: CLINIC | Age: 37
End: 2022-09-08
Payer: COMMERCIAL

## 2022-09-08 VITALS — WEIGHT: 199 LBS | BODY MASS INDEX: 30.26 KG/M2

## 2022-09-08 DIAGNOSIS — Z30.011 ORAL CONTRACEPTION INITIATION: ICD-10-CM

## 2022-09-08 PROCEDURE — 99207 PR POST PARTUM EXAM: CPT | Performed by: OBSTETRICS & GYNECOLOGY

## 2022-09-08 PROCEDURE — 87624 HPV HI-RISK TYP POOLED RSLT: CPT | Performed by: OBSTETRICS & GYNECOLOGY

## 2022-09-08 PROCEDURE — G0145 SCR C/V CYTO,THINLAYER,RESCR: HCPCS | Performed by: OBSTETRICS & GYNECOLOGY

## 2022-09-08 RX ORDER — ACETAMINOPHEN AND CODEINE PHOSPHATE 120; 12 MG/5ML; MG/5ML
0.35 SOLUTION ORAL DAILY
Qty: 90 TABLET | Refills: 3 | Status: SHIPPED | OUTPATIENT
Start: 2022-09-08 | End: 2023-12-19

## 2022-09-08 ASSESSMENT — ANXIETY QUESTIONNAIRES
5. BEING SO RESTLESS THAT IT IS HARD TO SIT STILL: SEVERAL DAYS
6. BECOMING EASILY ANNOYED OR IRRITABLE: MORE THAN HALF THE DAYS
7. FEELING AFRAID AS IF SOMETHING AWFUL MIGHT HAPPEN: SEVERAL DAYS
3. WORRYING TOO MUCH ABOUT DIFFERENT THINGS: SEVERAL DAYS
IF YOU CHECKED OFF ANY PROBLEMS ON THIS QUESTIONNAIRE, HOW DIFFICULT HAVE THESE PROBLEMS MADE IT FOR YOU TO DO YOUR WORK, TAKE CARE OF THINGS AT HOME, OR GET ALONG WITH OTHER PEOPLE: SOMEWHAT DIFFICULT
GAD7 TOTAL SCORE: 7
2. NOT BEING ABLE TO STOP OR CONTROL WORRYING: SEVERAL DAYS
1. FEELING NERVOUS, ANXIOUS, OR ON EDGE: SEVERAL DAYS
GAD7 TOTAL SCORE: 7

## 2022-09-08 ASSESSMENT — PATIENT HEALTH QUESTIONNAIRE - PHQ9
SUM OF ALL RESPONSES TO PHQ QUESTIONS 1-9: 10
5. POOR APPETITE OR OVEREATING: NOT AT ALL

## 2022-09-08 NOTE — PATIENT INSTRUCTIONS
Follow up with your primary care provider for your other medical problems.  Continue self breast exam.  Increase physical activity and exercise.  Lab and pap smear results will be called to the patient.  Co-testing done today due to history of AIS.    Usual safety and preventative measures counseling done.  BMI >25  Weight loss encouraged.   Will start minipill for contraception while breast feeding.  Recommend delaying further child-bearing for minimum of 6 months.

## 2022-09-12 LAB
BKR LAB AP GYN ADEQUACY: NORMAL
BKR LAB AP GYN INTERPRETATION: NORMAL
BKR LAB AP HPV REFLEX: NORMAL
BKR LAB AP PREVIOUS ABNL DX: NORMAL
BKR LAB AP PREVIOUS ABNORMAL: NORMAL
PATH REPORT.COMMENTS IMP SPEC: NORMAL
PATH REPORT.COMMENTS IMP SPEC: NORMAL
PATH REPORT.RELEVANT HX SPEC: NORMAL

## 2022-09-13 LAB
HUMAN PAPILLOMA VIRUS 16 DNA: NEGATIVE
HUMAN PAPILLOMA VIRUS 18 DNA: NEGATIVE
HUMAN PAPILLOMA VIRUS FINAL DIAGNOSIS: NORMAL
HUMAN PAPILLOMA VIRUS OTHER HR: NEGATIVE

## 2022-09-13 NOTE — PROGRESS NOTES
Goal Outcome Evaluation:  Plan of Care Reviewed With: patient      SLP treatment completed. Will continue to address dysphagia. Please see note for further details and recommendations.           Please see full imaging report from [facility] program under imaging tab.    Namrata Ayala MD  Maternal Fetal Medicine       Dementia without behavioral disturbance, unspecified dementia type Dementia without behavioral disturbance, unspecified dementia type Dementia without behavioral disturbance, unspecified dementia type Dementia without behavioral disturbance, unspecified dementia type Dementia without behavioral disturbance, unspecified dementia type Dementia without behavioral disturbance, unspecified dementia type Dementia without behavioral disturbance, unspecified dementia type

## 2022-09-14 ENCOUNTER — PATIENT OUTREACH (OUTPATIENT)
Dept: OBGYN | Facility: CLINIC | Age: 37
End: 2022-09-14

## 2022-10-22 ENCOUNTER — HEALTH MAINTENANCE LETTER (OUTPATIENT)
Age: 37
End: 2022-10-22

## 2022-10-27 ENCOUNTER — TELEPHONE (OUTPATIENT)
Dept: OBGYN | Facility: CLINIC | Age: 37
End: 2022-10-27

## 2022-10-27 NOTE — TELEPHONE ENCOUNTER
Type of Paperwork received:  Return to Work Form    Date Rcvd:  10/27/2022    Rcvd From (Company name): [employer]    Provider:  Srinivasan Osorio on Provider Cart Date:  10/27/2022

## 2023-04-01 ENCOUNTER — HEALTH MAINTENANCE LETTER (OUTPATIENT)
Age: 38
End: 2023-04-01

## 2023-04-23 ENCOUNTER — HOSPITAL ENCOUNTER (EMERGENCY)
Facility: CLINIC | Age: 38
Discharge: HOME | End: 2023-04-23
Attending: EMERGENCY MEDICINE | Admitting: EMERGENCY MEDICINE
Payer: COMMERCIAL

## 2023-04-23 VITALS
TEMPERATURE: 98.9 F | OXYGEN SATURATION: 94 % | HEIGHT: 68 IN | SYSTOLIC BLOOD PRESSURE: 112 MMHG | HEART RATE: 95 BPM | BODY MASS INDEX: 30.31 KG/M2 | RESPIRATION RATE: 16 BRPM | DIASTOLIC BLOOD PRESSURE: 61 MMHG | WEIGHT: 200 LBS

## 2023-04-23 DIAGNOSIS — R50.9 FEVER, UNSPECIFIED FEVER CAUSE: ICD-10-CM

## 2023-04-23 DIAGNOSIS — R11.2 NAUSEA AND VOMITING, UNSPECIFIED VOMITING TYPE: ICD-10-CM

## 2023-04-23 LAB
ALBUMIN UR-MCNC: NEGATIVE MG/DL
ANION GAP SERPL CALCULATED.3IONS-SCNC: 9 MMOL/L (ref 7–15)
APPEARANCE UR: CLEAR
BASOPHILS # BLD AUTO: 0 10E3/UL (ref 0–0.2)
BASOPHILS NFR BLD AUTO: 0 %
BILIRUB UR QL STRIP: NEGATIVE
BUN SERPL-MCNC: 9.7 MG/DL (ref 6–20)
CALCIUM SERPL-MCNC: 8.9 MG/DL (ref 8.6–10)
CHLORIDE SERPL-SCNC: 103 MMOL/L (ref 98–107)
COLOR UR AUTO: YELLOW
CREAT SERPL-MCNC: 0.86 MG/DL (ref 0.51–0.95)
DEPRECATED HCO3 PLAS-SCNC: 27 MMOL/L (ref 22–29)
EOSINOPHIL # BLD AUTO: 0 10E3/UL (ref 0–0.7)
EOSINOPHIL NFR BLD AUTO: 0 %
ERYTHROCYTE [DISTWIDTH] IN BLOOD BY AUTOMATED COUNT: 12.7 % (ref 10–15)
FLUAV RNA SPEC QL NAA+PROBE: NEGATIVE
FLUBV RNA RESP QL NAA+PROBE: NEGATIVE
GFR SERPL CREATININE-BSD FRML MDRD: 89 ML/MIN/1.73M2
GLUCOSE SERPL-MCNC: 112 MG/DL (ref 70–99)
GLUCOSE UR STRIP-MCNC: NEGATIVE MG/DL
HCG UR QL: NEGATIVE
HCT VFR BLD AUTO: 38.5 % (ref 35–47)
HGB BLD-MCNC: 12.7 G/DL (ref 11.7–15.7)
HGB UR QL STRIP: NEGATIVE
IMM GRANULOCYTES # BLD: 0 10E3/UL
IMM GRANULOCYTES NFR BLD: 0 %
KETONES UR STRIP-MCNC: NEGATIVE MG/DL
LACTATE SERPL-SCNC: 1.1 MMOL/L (ref 0.7–2)
LEUKOCYTE ESTERASE UR QL STRIP: NEGATIVE
LYMPHOCYTES # BLD AUTO: 0.6 10E3/UL (ref 0.8–5.3)
LYMPHOCYTES NFR BLD AUTO: 7 %
MCH RBC QN AUTO: 28.1 PG (ref 26.5–33)
MCHC RBC AUTO-ENTMCNC: 33 G/DL (ref 31.5–36.5)
MCV RBC AUTO: 85 FL (ref 78–100)
MONOCYTES # BLD AUTO: 0.4 10E3/UL (ref 0–1.3)
MONOCYTES NFR BLD AUTO: 5 %
MUCOUS THREADS #/AREA URNS LPF: PRESENT /LPF
NEUTROPHILS # BLD AUTO: 7.5 10E3/UL (ref 1.6–8.3)
NEUTROPHILS NFR BLD AUTO: 88 %
NITRATE UR QL: NEGATIVE
NRBC # BLD AUTO: 0 10E3/UL
NRBC BLD AUTO-RTO: 0 /100
PH UR STRIP: 6 [PH] (ref 5–7)
PLATELET # BLD AUTO: 251 10E3/UL (ref 150–450)
POTASSIUM SERPL-SCNC: 3.6 MMOL/L (ref 3.4–5.3)
RBC # BLD AUTO: 4.52 10E6/UL (ref 3.8–5.2)
RBC URINE: 1 /HPF
RSV RNA SPEC NAA+PROBE: NEGATIVE
SARS-COV-2 RNA RESP QL NAA+PROBE: NEGATIVE
SODIUM SERPL-SCNC: 139 MMOL/L (ref 136–145)
SP GR UR STRIP: 1.01 (ref 1–1.03)
SQUAMOUS EPITHELIAL: 14 /HPF
UROBILINOGEN UR STRIP-MCNC: NORMAL MG/DL
WBC # BLD AUTO: 8.7 10E3/UL (ref 4–11)
WBC URINE: 3 /HPF

## 2023-04-23 PROCEDURE — 81001 URINALYSIS AUTO W/SCOPE: CPT | Performed by: EMERGENCY MEDICINE

## 2023-04-23 PROCEDURE — 83605 ASSAY OF LACTIC ACID: CPT | Performed by: EMERGENCY MEDICINE

## 2023-04-23 PROCEDURE — 96376 TX/PRO/DX INJ SAME DRUG ADON: CPT

## 2023-04-23 PROCEDURE — 258N000003 HC RX IP 258 OP 636: Performed by: EMERGENCY MEDICINE

## 2023-04-23 PROCEDURE — 82310 ASSAY OF CALCIUM: CPT | Performed by: EMERGENCY MEDICINE

## 2023-04-23 PROCEDURE — 99284 EMERGENCY DEPT VISIT MOD MDM: CPT | Mod: CS,25

## 2023-04-23 PROCEDURE — 250N000013 HC RX MED GY IP 250 OP 250 PS 637: Performed by: EMERGENCY MEDICINE

## 2023-04-23 PROCEDURE — 250N000011 HC RX IP 250 OP 636: Performed by: EMERGENCY MEDICINE

## 2023-04-23 PROCEDURE — 36415 COLL VENOUS BLD VENIPUNCTURE: CPT | Performed by: EMERGENCY MEDICINE

## 2023-04-23 PROCEDURE — 87637 SARSCOV2&INF A&B&RSV AMP PRB: CPT | Performed by: EMERGENCY MEDICINE

## 2023-04-23 PROCEDURE — 85025 COMPLETE CBC W/AUTO DIFF WBC: CPT | Performed by: EMERGENCY MEDICINE

## 2023-04-23 PROCEDURE — 96361 HYDRATE IV INFUSION ADD-ON: CPT

## 2023-04-23 PROCEDURE — C9803 HOPD COVID-19 SPEC COLLECT: HCPCS

## 2023-04-23 PROCEDURE — 81025 URINE PREGNANCY TEST: CPT | Performed by: EMERGENCY MEDICINE

## 2023-04-23 PROCEDURE — 96374 THER/PROPH/DIAG INJ IV PUSH: CPT

## 2023-04-23 RX ORDER — ONDANSETRON 2 MG/ML
4 INJECTION INTRAMUSCULAR; INTRAVENOUS EVERY 30 MIN PRN
Status: DISCONTINUED | OUTPATIENT
Start: 2023-04-23 | End: 2023-04-23 | Stop reason: HOSPADM

## 2023-04-23 RX ORDER — IBUPROFEN 400 MG/1
400 TABLET, FILM COATED ORAL ONCE
Status: COMPLETED | OUTPATIENT
Start: 2023-04-23 | End: 2023-04-23

## 2023-04-23 RX ORDER — SODIUM CHLORIDE 9 MG/ML
INJECTION, SOLUTION INTRAVENOUS CONTINUOUS
Status: DISCONTINUED | OUTPATIENT
Start: 2023-04-23 | End: 2023-04-23 | Stop reason: HOSPADM

## 2023-04-23 RX ORDER — ONDANSETRON 4 MG/1
4 TABLET, ORALLY DISINTEGRATING ORAL EVERY 8 HOURS PRN
Qty: 10 TABLET | Refills: 0 | Status: SHIPPED | OUTPATIENT
Start: 2023-04-23 | End: 2023-12-19

## 2023-04-23 RX ADMIN — ONDANSETRON 4 MG: 2 INJECTION INTRAMUSCULAR; INTRAVENOUS at 08:42

## 2023-04-23 RX ADMIN — IBUPROFEN 400 MG: 400 TABLET ORAL at 11:05

## 2023-04-23 RX ADMIN — ONDANSETRON 4 MG: 2 INJECTION INTRAMUSCULAR; INTRAVENOUS at 11:05

## 2023-04-23 RX ADMIN — SODIUM CHLORIDE 1000 ML: 9 INJECTION, SOLUTION INTRAVENOUS at 08:41

## 2023-04-23 ASSESSMENT — ACTIVITIES OF DAILY LIVING (ADL)
ADLS_ACUITY_SCORE: 35
ADLS_ACUITY_SCORE: 35

## 2023-04-23 NOTE — ED TRIAGE NOTES
Patient states that she has a baby that just started . Family got sick. Everyone else is getting better but her. Pt on abx and taking tylenol for the fevers. A&Ox4     Triage Assessment     Row Name 04/23/23 0814       Triage Assessment (Adult)    Airway WDL WDL       Respiratory WDL    Respiratory WDL WDL       Skin Circulation/Temperature WDL    Skin Circulation/Temperature WDL WDL       Cardiac WDL    Cardiac WDL WDL       Peripheral/Neurovascular WDL    Peripheral Neurovascular WDL WDL       Cognitive/Neuro/Behavioral WDL    Cognitive/Neuro/Behavioral WDL WDL

## 2023-04-23 NOTE — ED PROVIDER NOTES
"  History     Chief Complaint:  Fever       HPI   Jose L Collazo is a 38 year old female who presents with fever.  Patient reports that she has been ill for the last 2 and half weeks.  Her family has had a cough and congestion.  Evergetting better but she is getting worse.  At the end of the week she was noted to have some \"razor blade\" sensation into her tonsils.  She was started on penicillin for tonsillitis.  Rapid strep was negative.  She reports that her throat pain is gotten better.  Prior to that she had only had low-grade fevers.  Since yesterday she has started to have increasing fevers up to 103 this morning.  She is also had some vomiting with her fevers.  Making it difficult for her to keep her Tylenol down.  She endorses some mild epigastric discomfort but otherwise denies any abdominal pain.  No significant cough, ear pain, rash.  No dysuria or frequency.  She was able to keep her morning Tylenol down.      Independent Historian:   None - Patient Only    Review of External Notes: Reviewed primary care note from 4/21/2023 outlining tonsillitis treatment.    ROS:  Review of Systems   All other systems reviewed and are negative.      Allergies:  Diphenhydramine  Seasonal Allergies     Medications:    ondansetron (ZOFRAN ODT) 4 MG ODT tab  acetaminophen (TYLENOL) 325 MG tablet  calcium carbonate (TUMS) 500 MG chewable tablet  docusate sodium (COLACE) 100 MG capsule  famotidine (PEPCID) 10 MG tablet  ibuprofen (ADVIL/MOTRIN) 800 MG tablet  magnesium 250 MG tablet  norethindrone (MICRONOR) 0.35 MG tablet  Prenatal Vit-Fe Fumarate-FA (PRENATAL MULTIVITAMIN  PLUS IRON) 27-1 MG TABS  vitamin D3 (CHOLECALCIFEROL) 250 mcg (39709 units) capsule        Past Medical History:    Past Medical History:   Diagnosis Date     Abnormal Pap smear of cervix      ADHD (attention deficit hyperactivity disorder)      Ankle fracture      High risk HPV infection      Other psoriasis        Past Surgical History:  " "  Past Surgical History:   Procedure Laterality Date     CONIZATION N/A 1/8/2019    Procedure: CERVICAL CONIZATION;  Surgeon: Matt Ray MD;  Location:  OR     CONIZATION N/A 5/23/2019    Procedure: CERVICAL CONIZATION;  Surgeon: Matt Ray MD;  Location:  OR     DILATION AND CURETTAGE N/A 1/8/2019    Procedure: ENDOCERVICAL CURETTAGE;  Surgeon: Matt Ray MD;  Location:  OR     DILATION AND CURETTAGE N/A 5/23/2019    Procedure: ENDOCERVICAL CURETTAGE;  Surgeon: Matt Ray MD;  Location:  OR     HC TOOTH EXTRACTION W/FORCEP  2003     MAMMOPLASTY REDUCTION  05/25/2017        Family History:    family history includes Cancer in her maternal grandfather; Lung Cancer in her maternal grandmother; No Known Problems in her brother, father, mother, paternal grandfather, paternal grandmother, sister, and another family member.    Social History:   reports that she has never smoked. She has never used smokeless tobacco. She reports that she does not drink alcohol and does not use drugs.  PCP: Deanne Mattson     Physical Exam     Patient Vitals for the past 24 hrs:   BP Temp Temp src Pulse Resp SpO2 Height Weight   04/23/23 1105 -- -- -- -- -- 94 % -- --   04/23/23 1104 112/61 -- -- 95 16 -- -- --   04/23/23 1054 -- 98.9  F (37.2  C) Oral -- 16 -- -- --   04/23/23 0939 -- -- -- -- -- 93 % -- --   04/23/23 0909 -- -- -- -- -- 94 % -- --   04/23/23 0812 122/66 (!) 100.9  F (38.3  C) Oral 106 16 95 % 1.727 m (5' 8\") 90.7 kg (200 lb)        Physical Exam  General: Resting on the bed.  Head: No obvious trauma to head.  Ears, Nose, Throat:  External ears normal.  Nose normal.  No pharyngeal erythema, swelling or exudate.  Midline uvula.  No trismus.  Eyes:  Conjunctivae clear.  Pupils are equal, round, and reactive.   Neck: Normal range of motion.  Neck supple. No nuchal rigidity.  CV: Regular rate and rhythm.  No murmurs.      Respiratory: Effort normal and breath sounds normal.  No wheezing " or crackles.   Gastrointestinal: Soft.  No distension. There is no tenderness.  There is no rigidity, no rebound and no guarding.   Neuro: Alert. Moving all extremities appropriately.  Normal speech.    Skin: Skin is warm and dry.  No rash noted.       Emergency Department Course     Laboratory:  Labs Ordered and Resulted from Time of ED Arrival to Time of ED Departure   BASIC METABOLIC PANEL - Abnormal       Result Value    Sodium 139      Potassium 3.6      Chloride 103      Carbon Dioxide (CO2) 27      Anion Gap 9      Urea Nitrogen 9.7      Creatinine 0.86      Calcium 8.9      Glucose 112 (*)     GFR Estimate 89     ROUTINE UA WITH MICROSCOPIC REFLEX TO CULTURE - Abnormal    Color Urine Yellow      Appearance Urine Clear      Glucose Urine Negative      Bilirubin Urine Negative      Ketones Urine Negative      Specific Gravity Urine 1.015      Blood Urine Negative      pH Urine 6.0      Protein Albumin Urine Negative      Urobilinogen Urine Normal      Nitrite Urine Negative      Leukocyte Esterase Urine Negative      Mucus Urine Present (*)     RBC Urine 1      WBC Urine 3      Squamous Epithelials Urine 14 (*)    CBC WITH PLATELETS AND DIFFERENTIAL - Abnormal    WBC Count 8.7      RBC Count 4.52      Hemoglobin 12.7      Hematocrit 38.5      MCV 85      MCH 28.1      MCHC 33.0      RDW 12.7      Platelet Count 251      % Neutrophils 88      % Lymphocytes 7      % Monocytes 5      % Eosinophils 0      % Basophils 0      % Immature Granulocytes 0      NRBCs per 100 WBC 0      Absolute Neutrophils 7.5      Absolute Lymphocytes 0.6 (*)     Absolute Monocytes 0.4      Absolute Eosinophils 0.0      Absolute Basophils 0.0      Absolute Immature Granulocytes 0.0      Absolute NRBCs 0.0     LACTIC ACID WHOLE BLOOD - Normal    Lactic Acid 1.1     HCG QUALITATIVE URINE - Normal    hCG Urine Qualitative Negative     INFLUENZA A/B, RSV, & SARS-COV2 PCR - Normal    Influenza A PCR Negative      Influenza B PCR Negative       RSV PCR Negative      SARS CoV2 PCR Negative          Procedures       Emergency Department Course & Assessments:             Interventions:  Medications   0.9% sodium chloride BOLUS (0 mLs Intravenous Stopped 23 0948)   ibuprofen (ADVIL/MOTRIN) tablet 400 mg (400 mg Oral $Given 23 1105)        Assessments:  0820 I spoke with and assessed the patient.      Independent Interpretation (X-rays, CTs, rhythm strip):  None    Consultations/Discussion of Management or Tests:   ED Course as of 23 1522   Sun 2023   1100 I rechecked the patient, she is feeling improved.  Plan for discharge.        Social Determinants of Health affecting care:   None    Disposition:  The patient was discharged to home.     Impression & Plan        Medical Decision Makin-year-old female presents to the ER with nausea, vomiting, fevers.  Is notable for mild tachycardia but resolved over the course of her stay.  Low-grade fever as well.  Broad differentials pursued include not limited to sepsis, UTI, pyelonephritis, nephrolithiasis, pregnancy, COVID-19, influenza, RSV, meningitis encephalitis, dehydration, appendicitis, obstruction, etc.  CBC without leukocytosis or anemia.  Lactate is normal.  No signs of severe sepsis or septic shock.  Pregnancy test negative.  UA unrevealing no signs of acute infectious etiology.  COVID, influenza, RSV are negative.  Posterior pharynx looks clear, no signs of retropharyngeal abscess, deep space neck infection, or other acute pathology.  Patient moving her neck freely.  Not endorsing any significant headache or other concerns.  Do not suspect meningitis or encephalitis.  She is otherwise healthy.  No other acute medical concerns.  Patient feeling improved after medications.  She has a benign abdominal dam.  No evidence of acute surgical process.  At this point I think she likely has a viral illness.  Recommend supportive care.  Close follow-up with PCP.  Return precautions were  given and she was discharged home.    Diagnosis:    ICD-10-CM    1. Fever, unspecified fever cause  R50.9       2. Nausea and vomiting, unspecified vomiting type  R11.2            Discharge Medications:  Discharge Medication List as of 4/23/2023 11:09 AM      START taking these medications    Details   ondansetron (ZOFRAN ODT) 4 MG ODT tab Take 1 tablet (4 mg) by mouth every 8 hours as needed for nausea, Disp-10 tablet, R-0, E-Prescribe                4/23/2023   Mony Ramirez MD Bennett, Jennifer L, MD  04/23/23 1528

## 2023-04-23 NOTE — DISCHARGE INSTRUCTIONS
Please keep hydrated with lots of fluids.  May use zofran as needed for nausea and vomiting.  If you are unable to keep fluids down, unable to urinate, lightheaded or dizzy, develop abdominal pain, fevers not responsive to tylenol/ibuprofen or other acute changes return to the ED.  Otherwise please follow up with your PCP in 2-3 days for a recheck.      Discharge Instructions  Vomiting    You have been seen today for vomiting (throwing up). This is usually caused by a virus, but some bacteria, parasites, medicines or other medical conditions can cause similar symptoms. At this time your provider does not find that your vomiting is a sign of anything dangerous or life-threatening. However, sometimes the signs of serious illness do not show up right away. If you have new or worse symptoms, you may need to be seen again in the Emergency Department or by your primary provider. Remember that serious problems like appendicitis can start as vomiting.    Generally, every Emergency Department visit should have a follow-up clinic visit with either a primary or a specialty clinic/provider. Please follow-up as instructed by your emergency provider today.    Return to the Emergency Department if:  You keep vomiting and you are not able to keep liquids down.   You feel you are getting dehydrated, such as being very thirsty, not urinating (peeing) at least every 8-12 hours, or feeling faint or lightheaded.   You develop a new fever, or your fever continues for more than 2 days.   You have abdominal (belly pain) that seems worse than cramps, is in one spot, or is getting worse over time. Appendicitis usually causes pain in the right lower abdomen (to the right and below your belly button) so watch for pain in this location.  You have blood in your vomit or stools.   You feel very weak.  You are not starting to improve within 24 hours of your visit here.     What can I do to help myself?  The most important thing to do is to drink  clear liquids. If you have been vomiting a lot, it is best to have only small, frequent sips of liquids. Drinking too much at once may cause more vomiting. If you are vomiting often, you must replace minerals, sodium and potassium lost with your illness. Pedialyte  is the best available rehydration liquid but some find that it doesn t taste good so sports drinks are an alterative. You can also drink clear liquids such as water, weak tea, apple juice, and 7-Up . Avoid acid liquids (orange), caffeine (coffee) or alcohol. Do not drink milk until you no longer have diarrhea (loose stools).   After liquids are staying down, you may start eating mild foods. Soda crackers, toast, plain noodles, gelatin, applesauce and bananas are good first choices. Avoid foods that have acid, are spicy, fatty or have a lot of fiber (such as meats, coarse grains, vegetables). You may start eating these foods again in about 3 days when you are better.   Sometimes treatment includes prescription medicine to prevent nausea (sick to your stomach) and vomiting. If your provider prescribes these for you, take them as directed.   Do not take ibuprofen, naproxen, or other nonsteroidal anti-inflammatory (NSAID) medicines without checking with your healthcare provider.     If you were given a prescription for medicine here today, be sure to read all of the information (including the package insert) that comes with your prescription.  This will include important information about the medicine, its side effects, and any warnings that you need to know about.  The pharmacist who fills the prescription can provide more information and answer questions you may have about the medicine.  If you have questions or concerns that the pharmacist cannot address, please call or return to the Emergency Department.     Remember that you can always come back to the Emergency Department if you are not able to see your regular provider in the amount of time listed  above, if you get any new symptoms, or if there is anything that worries you.    Discharge Instructions  Fever    You have been seen today for a fever. Fever is a normal body reaction to illness or inflammation. Fever is a sign that your body is doing what it should to fight something off. Fever is not dangerous, but it can make you feel miserable, and you will probably feel better if you get your fever to go down. Most infections are caused by a virus, and antibiotics will not help; your provider will tell you whether antibiotics are needed in your case. At this time your provider does not find that your fever is a sign of anything dangerous or life-threatening.  However, sometimes the signs of serious illness do not show up right away so additional care may be necessary.    Generally, every Emergency Department visit should have a follow-up clinic visit with either a primary or a specialty clinic/provider. Please follow-up as instructed by your emergency provider today.    What can I do to help myself?  Fill any prescriptions the provider gave you and take them right away--especially antibiotics.  If you have a fever, get plenty of rest and drink lots of fluids, especially water.  What clothes or blankets you have on will not change your fever. Do what is comfortable for you.  Bathing or sponging in lukewarm water may help you feel better.  Tylenol  (acetaminophen), Motrin  (ibuprofen), or Advil  (ibuprofen) help bring fever down and may help you feel more comfortable. Be sure to read and follow the package directions, and ask your provider if you have questions.  Do not drink alcohol.    Return to the Emergency Department if:  Any of the symptoms you have get much worse.  You seem very sick, like being too weak to get up.  You have any new symptoms, especially serious things like abdominal (belly) pain or chest pain.  You are short of breath.  You have a severe headache.  You are vomiting (throwing up) so much  you cannot keep fluids or medicines down.  You have confusion or seem unusually drowsy.  You have a seizure.  You have anything else that worries you.  If you were given a prescription for medicine here today, be sure to read all of the information (including the package insert) that comes with your prescription.  This will include important information about the medicine, its side effects, and any warnings that you need to know about.  The pharmacist who fills the prescription can provide more information and answer questions you may have about the medicine.  If you have questions or concerns that the pharmacist cannot address, please call or return to the Emergency Department.   Remember that you can always come back to the Emergency Department if you are not able to see your regular provider in the amount of time listed above, if you get any new symptoms, or if there is anything that worries you.

## 2023-09-27 PROBLEM — O09.90 SUPERVISION OF HIGH-RISK PREGNANCY: Status: RESOLVED | Noted: 2021-12-15 | Resolved: 2023-09-27

## 2023-09-27 PROBLEM — Z30.41 USES ORAL CONTRACEPTION: Status: ACTIVE | Noted: 2023-09-27

## 2023-10-13 NOTE — PROGRESS NOTES
Jose L is a 38 year old  female who presents for annual exam.     Besides routine health maintenance,  she would like to discuss if she is clear for second pregnancy.    HPI: here for annual exam.  She is of OCP's wants to try for another baby.  Her baby is 14 months old, states she has been the easiest and best baby.  Cycles have returned and are normal.  No other concerns today    The patient's PCP is  Deanne Mattson MD.        GYNECOLOGIC HISTORY:    Patient's last menstrual period was 2023 (exact date).    Regular menses? yes  Menses every 28-35 days.  Length of menses: 4 days    Her current contraception method is: oral contraceptives.  She  reports that she has never smoked. She has never used smokeless tobacco.  Patient is sexually active.  STD testing offered?  Declined  Last PHQ-9 score on record =       10/16/2023     2:35 PM   PHQ-9 SCORE   PHQ-9 Total Score 0     Last GAD7 score on record =       10/16/2023     2:35 PM   ANA-7 SCORE   Total Score 0     Alcohol Score = 0    HEALTH MAINTENANCE:  Recent Labs   Lab Test 18  1210 17  0911   CHOL 178 165   HDL 56 68   * 92   TRIG 60 26       Last Mammo: Not applicable, Result: Not applicable, Next Mammo: Due at age 41   Pap:   Lab Results   Component Value Date    GYNINTERP  2022     Negative for Intraepithelial Lesion or Malignancy (NILM)    PAP NIL 2020    PAP NIL 2020    PAP NIL 10/16/2019   HPV-  Colonoscopy:  NA, Result: Not applicable, Next Colonoscopy: Due at age 46  Dexa:  NA    Health maintenance updated:  yes    HISTORY:  OB History    Para Term  AB Living   3 1 1 0 2 1   SAB IAB Ectopic Multiple Live Births   2 0 0 0 1      # Outcome Date GA Lbr [place]/2nd Weight Sex Delivery Anes PTL Lv   3 Term 22 39w6d / 00:50 3.22 kg (7 lb 1.6 oz) F Vag-Spont EPI N SERGIO      Name: Katie      Apgar1: 8  Apgar5: 9   2 SAB 2021           1 2020               Patient Active Problem  List   Diagnosis    Cervical high risk HPV (human papillomavirus) test positive    Endocervical adenocarcinoma (H)    Uses oral contraception     Past Surgical History:   Procedure Laterality Date    CONIZATION N/A 1/8/2019    Procedure: CERVICAL CONIZATION;  Surgeon: Matt Ray MD;  Location:  OR    CONIZATION N/A 5/23/2019    Procedure: CERVICAL CONIZATION;  Surgeon: Matt Ray MD;  Location:  OR    DILATION AND CURETTAGE N/A 1/8/2019    Procedure: ENDOCERVICAL CURETTAGE;  Surgeon: Matt Ray MD;  Location:  OR    DILATION AND CURETTAGE N/A 5/23/2019    Procedure: ENDOCERVICAL CURETTAGE;  Surgeon: Matt Ray MD;  Location:  OR    HC TOOTH EXTRACTION W/FORCEP  2003    MAMMOPLASTY REDUCTION  05/25/2017      Social History     Tobacco Use    Smoking status: Never    Smokeless tobacco: Never   Substance Use Topics    Alcohol use: No     Comment: has not had a drink in about 10 yrs      Problem (# of Occurrences) Relation (Name,Age of Onset)    Cancer (1) Maternal Grandfather: multiple melanoma ? r/t exposure to Agent Orange    Lung Cancer (1) Maternal Grandmother: non smoker    No Known Problems (7) Mother, Father, Paternal Grandmother, Paternal Grandfather, Sister, Brother, Other           Negative family history of: Asthma, C.A.D., Diabetes, Hypertension              Current Outpatient Medications   Medication Sig    clobetasol (TEMOVATE) 0.05 % CREA cream Use twice a day on affected area (elbows) for up to 2 weeks at a time Indications: Plaque Psoriasis    fluocinonide (LIDEX) 0.05 % external solution Apply topically two times a day. Use for 2 weeks at a time.    Prenatal Vit-Fe Fumarate-FA (PRENATAL MULTIVITAMIN  PLUS IRON) 27-1 MG TABS Take by mouth daily    acetaminophen (TYLENOL) 325 MG tablet Take 2 tablets (650 mg) by mouth every 4 hours as needed for mild pain (Patient not taking: Reported on 9/8/2022)    calcium carbonate (TUMS) 500 MG chewable tablet Take 1 chew tab by  "mouth 2 times daily (Patient not taking: Reported on 10/16/2023)    docusate sodium (COLACE) 100 MG capsule Take 1 capsule (100 mg) by mouth daily (Patient not taking: Reported on 10/16/2023)    famotidine (PEPCID) 10 MG tablet Take 10 mg by mouth 2 times daily (Patient not taking: Reported on 10/16/2023)    ibuprofen (ADVIL/MOTRIN) 800 MG tablet Take 1 tablet (800 mg) by mouth every 6 hours as needed for other (cramping) (Patient not taking: Reported on 9/8/2022)    magnesium 250 MG tablet Take 1 tablet by mouth daily (Patient not taking: Reported on 10/16/2023)    norethindrone (MICRONOR) 0.35 MG tablet Take 1 tablet (0.35 mg) by mouth daily (Patient not taking: Reported on 10/16/2023)    ondansetron (ZOFRAN ODT) 4 MG ODT tab Take 1 tablet (4 mg) by mouth every 8 hours as needed for nausea (Patient not taking: Reported on 10/16/2023)    vitamin D3 (CHOLECALCIFEROL) 250 mcg (08919 units) capsule Take 1 capsule by mouth daily (Patient not taking: Reported on 10/16/2023)     No current facility-administered medications for this visit.     Allergies   Allergen Reactions    Diphenhydramine Visual Disturbance     PN: LW Reaction: makes me crazy  Hallucinations, decreased motor control    Seasonal Allergies        Past medical, surgical, social and family histories were reviewed and updated in EPIC.    ROS:   12 point review of systems negative other than symptoms noted below or in the HPI.  Normal menstrual cycles    EXAM:  /72   Ht 1.727 m (5' 8\")   Wt 100.3 kg (221 lb 3.2 oz)   LMP 09/20/2023 (Exact Date)   BMI 33.63 kg/m     BMI: Body mass index is 33.63 kg/m .    PHYSICAL EXAM:  Constitutional:   Appearance: Well nourished, well developed, alert, in no acute distress  Neck:  Lymph Nodes:  No lymphadenopathy present    Thyroid:  Gland size normal, nontender, no nodules or masses present  on palpation  Chest:  Respiratory Effort:  Breathing unlabored  Cardiovascular:    Heart: Auscultation:  Regular rate, " normal rhythm, no murmurs present  Breasts: Inspection of Breasts:  No lymphadenopathy present., Palpation of Breasts and Axillae:  No masses present on palpation, no breast tenderness., Axillary Lymph Nodes:  No lymphadenopathy present., and No nodularity, asymmetry or nipple discharge bilaterally.  Gastrointestinal:   Abdominal Examination:  Abdomen nontender to palpation, tone normal without rigidity or guarding, no masses present, umbilicus without lesions   Liver and Spleen:  No hepatomegaly present, liver nontender to palpation    Hernias:  No hernias present  Lymphatic: Lymph Nodes:  No other lymphadenopathy present  Skin:  General Inspection:  No rashes present, no lesions present, no areas of  discoloration  Neurologic:    Mental Status:  Oriented X3.  Normal strength and tone, sensory exam                grossly normal, mentation intact and speech normal.    Psychiatric:   Mentation appears normal and affect normal/bright.         Pelvic Exam:  External Genitalia:     Normal appearance for age, no discharge present, no tenderness present, no inflammatory lesions present, color normal  Vagina:     Normal vaginal vault without central or paravaginal defects, no discharge present, no inflammatory lesions present, no masses present  Bladder:     Nontender to palpation  Urethra:   Urethral Body:  Urethra palpation normal, urethra structural support normal   Urethral Meatus:  No erythema or lesions present  Cervix:     Appearance healthy, no lesions present, nontender to palpation, no bleeding present  Uterus:     Uterus: firm, normal sized and nontender, midplane in position.   Adnexa:     No adnexal tenderness present, no adnexal masses present  Perineum:     Perineum within normal limits, no evidence of trauma, no rashes or skin lesions present  Anus:     Anus within normal limits, no hemorrhoids present  Inguinal Lymph Nodes:     No lymphadenopathy present  Pubic Hair:     Normal pubic hair distribution for  age  Genitalia and Groin:     No rashes present, no lesions present, no areas of discoloration, no masses present    COUNSELING:   Reviewed preventive health counseling, as reflected in patient instructions       Regular exercise       Healthy diet/nutrition       Family planning    BMI: Body mass index is 33.63 kg/m .  Weight management plan: Discussed healthy diet and exercise guidelines    ASSESSMENT:  38 year old female with satisfactory annual exam.    ICD-10-CM    1. Encounter for gynecological examination without abnormal finding  Z01.419       2. Screening for cervical cancer  Z12.4 Pap thin layer screen with HPV - recommended age 30 - 65 years          PLAN:  Normal Gyn exam.   Return prn or 1 year for annual.    YAMILE Nath CNP

## 2023-10-15 ASSESSMENT — ENCOUNTER SYMPTOMS
DYSURIA: 0
CONSTIPATION: 0
EYE PAIN: 0
FREQUENCY: 0
PALPITATIONS: 0
PARESTHESIAS: 0
DIZZINESS: 0
COUGH: 0
MYALGIAS: 0
HEMATOCHEZIA: 0
NAUSEA: 0
SORE THROAT: 0
SHORTNESS OF BREATH: 0
CHILLS: 0
HEMATURIA: 0
NERVOUS/ANXIOUS: 0
FEVER: 0
WEAKNESS: 0
ABDOMINAL PAIN: 0
DIARRHEA: 0
HEARTBURN: 0
JOINT SWELLING: 0
BREAST MASS: 0
ARTHRALGIAS: 0
HEADACHES: 1

## 2023-10-16 ENCOUNTER — OFFICE VISIT (OUTPATIENT)
Dept: OBGYN | Facility: CLINIC | Age: 38
End: 2023-10-16
Payer: COMMERCIAL

## 2023-10-16 VITALS
WEIGHT: 221.2 LBS | SYSTOLIC BLOOD PRESSURE: 106 MMHG | BODY MASS INDEX: 33.52 KG/M2 | HEIGHT: 68 IN | DIASTOLIC BLOOD PRESSURE: 72 MMHG

## 2023-10-16 DIAGNOSIS — Z01.419 ENCOUNTER FOR GYNECOLOGICAL EXAMINATION WITHOUT ABNORMAL FINDING: Primary | ICD-10-CM

## 2023-10-16 DIAGNOSIS — Z12.4 SCREENING FOR CERVICAL CANCER: ICD-10-CM

## 2023-10-16 PROCEDURE — 99395 PREV VISIT EST AGE 18-39: CPT | Performed by: NURSE PRACTITIONER

## 2023-10-16 PROCEDURE — G0145 SCR C/V CYTO,THINLAYER,RESCR: HCPCS | Performed by: NURSE PRACTITIONER

## 2023-10-16 PROCEDURE — 87624 HPV HI-RISK TYP POOLED RSLT: CPT | Performed by: NURSE PRACTITIONER

## 2023-10-16 RX ORDER — CLOBETASOL PROPIONATE 0.5 MG/G
CREAM TOPICAL
COMMUNITY
Start: 2023-06-05

## 2023-10-16 RX ORDER — FLUOCINONIDE TOPICAL SOLUTION USP, 0.05% 0.5 MG/ML
SOLUTION TOPICAL
COMMUNITY
Start: 2023-10-02

## 2023-10-16 ASSESSMENT — ANXIETY QUESTIONNAIRES
5. BEING SO RESTLESS THAT IT IS HARD TO SIT STILL: NOT AT ALL
2. NOT BEING ABLE TO STOP OR CONTROL WORRYING: NOT AT ALL
GAD7 TOTAL SCORE: 0
3. WORRYING TOO MUCH ABOUT DIFFERENT THINGS: NOT AT ALL
GAD7 TOTAL SCORE: 0
7. FEELING AFRAID AS IF SOMETHING AWFUL MIGHT HAPPEN: NOT AT ALL
IF YOU CHECKED OFF ANY PROBLEMS ON THIS QUESTIONNAIRE, HOW DIFFICULT HAVE THESE PROBLEMS MADE IT FOR YOU TO DO YOUR WORK, TAKE CARE OF THINGS AT HOME, OR GET ALONG WITH OTHER PEOPLE: NOT DIFFICULT AT ALL
1. FEELING NERVOUS, ANXIOUS, OR ON EDGE: NOT AT ALL
6. BECOMING EASILY ANNOYED OR IRRITABLE: NOT AT ALL

## 2023-10-16 ASSESSMENT — PATIENT HEALTH QUESTIONNAIRE - PHQ9
SUM OF ALL RESPONSES TO PHQ QUESTIONS 1-9: 0
5. POOR APPETITE OR OVEREATING: NOT AT ALL

## 2023-10-24 ENCOUNTER — PATIENT OUTREACH (OUTPATIENT)
Dept: OBGYN | Facility: CLINIC | Age: 38
End: 2023-10-24
Payer: COMMERCIAL

## 2023-12-18 ENCOUNTER — OFFICE VISIT (OUTPATIENT)
Dept: URGENT CARE | Facility: URGENT CARE | Age: 38
End: 2023-12-18
Payer: COMMERCIAL

## 2023-12-18 VITALS
RESPIRATION RATE: 16 BRPM | OXYGEN SATURATION: 98 % | TEMPERATURE: 98.6 F | SYSTOLIC BLOOD PRESSURE: 114 MMHG | HEART RATE: 89 BPM | DIASTOLIC BLOOD PRESSURE: 70 MMHG

## 2023-12-18 DIAGNOSIS — J01.90 ACUTE SINUSITIS WITH COEXISTING CONDITION REQUIRING PROPHYLACTIC TREATMENT: ICD-10-CM

## 2023-12-18 DIAGNOSIS — J06.9 UPPER RESPIRATORY TRACT INFECTION, UNSPECIFIED TYPE: Primary | ICD-10-CM

## 2023-12-18 PROCEDURE — 99203 OFFICE O/P NEW LOW 30 MIN: CPT | Performed by: FAMILY MEDICINE

## 2023-12-18 RX ORDER — AMOXICILLIN 875 MG
875 TABLET ORAL 2 TIMES DAILY
Qty: 14 TABLET | Refills: 0 | Status: SHIPPED | OUTPATIENT
Start: 2023-12-18 | End: 2023-12-25
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
## 2023-12-19 ENCOUNTER — VIRTUAL VISIT (OUTPATIENT)
Dept: OBGYN | Facility: CLINIC | Age: 38
End: 2023-12-19
Payer: COMMERCIAL

## 2023-12-19 DIAGNOSIS — O09.529 SUPERVISION OF HIGH-RISK PREGNANCY OF ELDERLY MULTIGRAVIDA: Primary | ICD-10-CM

## 2023-12-19 DIAGNOSIS — O36.80X0 PREGNANCY WITH INCONCLUSIVE FETAL VIABILITY: ICD-10-CM

## 2023-12-19 DIAGNOSIS — Z13.79 GENETIC SCREENING: ICD-10-CM

## 2023-12-19 DIAGNOSIS — O09.91 SUPERVISION OF HIGH RISK PREGNANCY IN FIRST TRIMESTER: ICD-10-CM

## 2023-12-19 PROCEDURE — 99207 PR NO CHARGE NURSE ONLY: CPT

## 2023-12-19 NOTE — PATIENT INSTRUCTIONS
Learning About Pregnancy  Your Care Instructions     Your health in the early weeks of your pregnancy is particularly important for your baby's health. Take good care of yourself. Anything you do that harms your body can also harm your baby.  Make sure to go to all of your doctor appointments. Regular checkups will help keep you and your baby healthy.  How can you care for yourself at home?  Diet    Eat a balanced diet. Make sure your diet includes plenty of beans, peas, and leafy green vegetables.     Do not skip meals or go for many hours without eating. If you are nauseated, try to eat a small, healthy snack every 2 to 3 hours.     Do not eat fish that has a high level of mercury, such as shark, swordfish, or mackerel. Do not eat more than one can of tuna each week.     Drink plenty of fluids. If you have kidney, heart, or liver disease and have to limit fluids, talk with your doctor before you increase the amount of fluids you drink.     Cut down on caffeine, such as coffee, tea, and cola.     Do not drink alcohol, such as beer, wine, or hard liquor.     Take a multivitamin that contains at least 400 micrograms (mcg) of folic acid to help prevent birth defects. Fortified cereal and whole wheat bread are good additional sources of folic acid.     Increase the calcium in your diet. Try to drink a quart of skim milk each day. You may also take calcium supplements and choose foods such as cheese and yogurt.   Lifestyle    Make sure you go to your follow-up appointments.     Get plenty of rest. You may be unusually tired while you are pregnant.     Get at least 30 minutes of exercise on most days of the week. Walking is a good choice. If you have not exercised in the past, start out slowly. Take several short walks each day.     Do not smoke. If you need help quitting, talk to your doctor about stop-smoking programs. These can increase your chances of quitting for good.     Do not touch cat feces or litter boxes.  Also, wash your hands after you handle raw meat, and fully cook all meat before you eat it. Wear gloves when you work in the yard or garden, and wash your hands well when you are done. Cat feces, raw or undercooked meat, and contaminated dirt can cause an infection that may harm your baby or lead to a miscarriage.     Avoid things that can make your body too hot and may be harmful to your baby, such as a hot tub or sauna. Or talk with your doctor before doing anything that raises your body temperature. Your doctor can tell you if it's safe.     Avoid chemical fumes, paint fumes, or poisons.     Do not use illegal drugs, marijuana, or alcohol.   Medicines    Review all of your medicines with your doctor. Some of your routine medicines may need to be changed to protect your baby.     Use acetaminophen (Tylenol) to relieve minor problems, such as a mild headache or backache or a mild fever with cold symptoms. Do not use nonsteroidal anti-inflammatory drugs (NSAIDs), such as ibuprofen (Advil, Motrin) or naproxen (Aleve), unless your doctor says it is okay.     Do not take two or more pain medicines at the same time unless the doctor told you to. Many pain medicines have acetaminophen, which is Tylenol. Too much acetaminophen (Tylenol) can be harmful.     Take your medicines exactly as prescribed. Call your doctor if you think you are having a problem with your medicine.   To manage morning sickness    If you feel sick when you first wake up, try eating a small snack (such as crackers) before you get out of bed. Allow some time to digest the snack, and then get out of bed slowly.     Do not skip meals or go for long periods without eating. An empty stomach can make nausea worse.     Eat small, frequent meals instead of three large meals each day.     Drink plenty of fluids.     Eat foods that are high in protein but low in fat.     If you are taking iron supplements, ask your doctor if they are necessary. Iron can make  "nausea worse.     Avoid any smells, such as coffee, that make you feel sick.     Get lots of rest. Morning sickness may be worse when you are tired.   Follow-up care is a key part of your treatment and safety. Be sure to make and go to all appointments, and call your doctor if you are having problems. It's also a good idea to know your test results and keep a list of the medicines you take.  Where can you learn more?  Go to https://www.[email or web address].net/patiented  Enter E868 in the search box to learn more about \"Learning About Pregnancy.\"  Current as of: July 11, 2023               Content Version: 13.8    5176-3464 [employer].   Care instructions adapted under license by your healthcare professional. If you have questions about a medical condition or this instruction, always ask your healthcare professional. [employer] disclaims any warranty or liability for your use of this information.      Weeks 6 to 10 of Your Pregnancy: Care Instructions  During these weeks of pregnancy, your body goes through many changes. You may start to feel different, both in your body and your emotions. Each pregnancy is different, so there's no \"right\" way to feel. These early weeks are a time to make healthy choices for you and your pregnancy.    Take a daily prenatal vitamin. Choose one with folic acid in it.   Avoid alcohol, tobacco, and drugs (including marijuana). If you need help quitting, talk to your doctor.     Drink plenty of liquids.  Be sure to drink enough water. And limit sodas, other sweetened drinks, and caffeine.     Choose foods that are good sources of calcium, iron, and folate.  You can try dairy products, dark leafy greens, fortified orange juice and cereals, almonds, broccoli, dried fruit, and beans.     Avoid foods that may be harmful.  Don't eat raw meat, deli meat, raw seafood, or raw eggs. Avoid soft cheese and unpasteurized dairy, like Brie and blue cheese. And don't eat fish that " "contains a lot of mercury, like shark and swordfish.     Don't touch sridhar litter or cat poop.  They can cause an infection that could be harmful during pregnancy.     Avoid things that can make your body too hot.  For example, avoid hot tubs and saunas.     Soothe morning sickness.  Try eating 5 or 6 small meals a day, getting some fresh air, or using nael to control symptoms.     Ask your doctor about flu and COVID-19 shots.  Getting them can help protect against infection.   Follow-up care is a key part of your treatment and safety. Be sure to make and go to all appointments, and call your doctor if you are having problems. It's also a good idea to know your test results and keep a list of the medicines you take.  Where can you learn more?  Go to [email or web address]  Enter G112 in the search box to learn more about \"Weeks 6 to 10 of Your Pregnancy: Care Instructions.\"  Current as of: July 11, 2023               Content Version: 13.8 2006-2023 [employer].   Care instructions adapted under license by your healthcare professional. If you have questions about a medical condition or this instruction, always ask your healthcare professional. [employer] disclaims any warranty or liability for your use of this information.         Managing Morning Sickness (01:55)  Your health professional recommends that you watch this short online health video.  Learn tips for dealing with morning sickness, no matter what time of day you have it.  Purpose:  Gives tips for managing morning sickness, including eating small low-fat meals and avoiding caffeine and spicy food.  Goal:  The user will learn tips for dealing with morning sickness during pregnancy.     How to watch the video    Scan the QR code   OR Visit the website    [email or web address]   Current as of: July 11, 2023               Content Version: 13.8 2006-2023 [employer].   Care " instructions adapted under license by your healthcare professional. If you have questions about a medical condition or this instruction, always ask your healthcare professional. [employer] disclaims any warranty or liability for your use of this information.      Pregnancy and Heartburn: Care Instructions  Overview     Heartburn is a common problem during pregnancy.  Heartburn happens when stomach acid backs up into the tube that carries food to the stomach. This tube is called the esophagus. Early in pregnancy, heartburn is caused by hormone changes that slow down digestion. Later on, it's also caused by the large uterus pushing up on the stomach.  Even though you can't fix the cause, there are things you can do to get relief. Treating heartburn during pregnancy focuses first on making lifestyle changes, like changing what and how you eat, and on taking medicines.  Heartburn usually improves or goes away after childbirth.  Follow-up care is a key part of your treatment and safety. Be sure to make and go to all appointments, and call your doctor if you are having problems. It's also a good idea to know your test results and keep a list of the medicines you take.  How can you care for yourself at home?  Eat small, frequent meals.  Avoid foods that make your symptoms worse, such as chocolate, peppermint, and spicy foods. Avoid drinks with caffeine, such as coffee, tea, and sodas.  Avoid bending over or lying down after meals.  Take a short walk after you eat.  If heartburn is a problem at night, do not eat for 2 hours before bedtime.  Take antacids like Mylanta, Maalox, Rolaids, or Tums. Do not take antacids that have sodium bicarbonate, magnesium trisilicate, or aspirin. Be careful when you take over-the-counter antacid medicines. Many of these medicines have aspirin in them. While you are pregnant, do not take aspirin or medicines that contain aspirin unless your doctor says it is okay.  If you're not  "getting relief, talk to your doctor. You may be able to take a stronger acid-reducing medicine.  When should you call for help?   Call your doctor now or seek immediate medical care if:    You have new or worse belly pain.     You are vomiting.   Watch closely for changes in your health, and be sure to contact your doctor if:    You have new or worse symptoms of reflux.     You are losing weight.     You have trouble or pain swallowing.     You do not get better as expected.   Where can you learn more?  Go to https://www.[email or web address]/patiented  Enter U946 in the search box to learn more about \"Pregnancy and Heartburn: Care Instructions.\"  Current as of: July 11, 2023               Content Version: 13.8    7703-7686 [employer].   Care instructions adapted under license by your healthcare professional. If you have questions about a medical condition or this instruction, always ask your healthcare professional. [employer] disclaims any warranty or liability for your use of this information.      Constipation: Care Instructions  Overview     Constipation means that you have a hard time passing stools (bowel movements). People pass stools from 3 times a day to once every 3 days. What is normal for you may be different. Constipation may occur with pain in the rectum and cramping. The pain may get worse when you try to pass stools. Sometimes there are small amounts of bright red blood on toilet paper or the surface of stools. This is because of enlarged veins near the rectum (hemorrhoids).  A few changes in your diet and lifestyle may help you avoid ongoing constipation. Your doctor may also prescribe medicine to help loosen your stool.  Some medicines can cause constipation. These include pain medicines and antidepressants. Tell your doctor about all the medicines you take. Your doctor may want to make a medicine change to ease your symptoms.  Follow-up care is a key part of your treatment " "and safety. Be sure to make and go to all appointments, and call your doctor if you are having problems. It's also a good idea to know your test results and keep a list of the medicines you take.  How can you care for yourself at home?  Drink plenty of fluids. If you have kidney, heart, or liver disease and have to limit fluids, talk with your doctor before you increase the amount of fluids you drink.  Include high-fiber foods in your diet each day. These include fruits, vegetables, beans, and whole grains.  Get at least 30 minutes of exercise on most days of the week. Walking is a good choice. You also may want to do other activities, such as running, swimming, cycling, or playing tennis or team sports.  Take a fiber supplement, such as Citrucel or Metamucil, every day. Read and follow all instructions on the label.  Schedule time each day for a bowel movement. A daily routine may help. Take your time having a bowel movement, but don't sit for more than 10 minutes at a time. And don't strain too much.  Support your feet with a small step stool when you sit on the toilet. This helps flex your hips and places your pelvis in a squatting position.  Your doctor may recommend an over-the-counter laxative to relieve your constipation. Examples are Milk of Magnesia and MiraLax. Read and follow all instructions on the label. Do not use laxatives on a long-term basis.  When should you call for help?   Call your doctor now or seek immediate medical care if:    You have new or worse belly pain.     You have new or worse nausea or vomiting.     You have blood in your stools.   Watch closely for changes in your health, and be sure to contact your doctor if:    Your constipation is getting worse.     You do not get better as expected.   Where can you learn more?  Go to https://www.healthwise.net/patiented  Enter P343 in the search box to learn more about \"Constipation: Care Instructions.\"  Current as of: March 21, " "2023               Content Version: 13.8 2006-2023 [employer].   Care instructions adapted under license by your healthcare professional. If you have questions about a medical condition or this instruction, always ask your healthcare professional. [employer] disclaims any warranty or liability for your use of this information.      Learning About High-Iron Foods  What foods are high in iron?     The foods you eat contain nutrients, such as vitamins and minerals. Iron is a nutrient. Your body needs the right amount to stay healthy and work as it should. You can use the list below to help you make choices about which foods to eat.  Here are some foods that contain iron. They have 1 to 2 milligrams of iron per serving.  Fruits  Figs (dried), 5 figs  Vegetables  Asparagus (canned), 6 au  Doroteo, beet, Swiss chard, or turnip greens, 1 cup  Dried peas, cooked,   cup  Seaweed, spirulina (dried),   cup  Spinach, (cooked)   cup or (raw) 1 cup  Grains  Cereals, fortified with iron, 1 cup  Grits (instant, cooked), fortified with iron,   cup  Meats and other protein foods  Beans (kidney, lima, navy, white), canned or cooked,   cup  Beef or lamb, 3 oz  Chicken giblets, 3 oz  Chickpeas (garbanzo beans),   cup  Liver of beef, lamb, or pork, 3 oz  Oysters (cooked), 3 oz  Sardines (canned), 3 oz  Soybeans (boiled),   cup  Tofu (firm),   cup  Work with your doctor to find out how much of this nutrient you need. Depending on your health, you may need more or less of it in your diet.  Where can you learn more?  Go to https://www.healthBoostSuite.net/patiented  Enter R005 in the search box to learn more about \"Learning About High-Iron Foods.\"  Current as of: February 28, 2023               Content Version: 13.8 2006-2023 [employer].   Care instructions adapted under license by your healthcare professional. If you have questions about a medical condition or this instruction, always ask your " healthcare professional. [employer] disclaims any warranty or liability for your use of this information.      Rubella (Indian Measles): Care Instructions  Overview  Rubella, also called Indian measles or 3-day measles, is a disease caused by a virus. It spreads by coughs, sneezes, and close contact. Rubella usually is mild and does not cause long-term problems. But if you are pregnant and get it, you can give the disease to your unborn baby. This can cause serious birth defects.  While you have rubella, you may get a rash and a mild fever, and the lymph glands in your neck may swell. Older children often have a fever, eye pain, a sore throat, and body aches. You can relieve most symptoms with care at home. Avoid being around others, especially pregnant people, until your rash has been gone for at least 4 days. People who have not had this disease before or have not had the vaccine have the greatest chance of getting the virus.  Follow-up care is a key part of your treatment and safety. Be sure to make and go to all appointments, and call your doctor if you are having problems. It's also a good idea to know your test results and keep a list of the medicines you take.  How can you care for yourself at home?  Drink plenty of fluids. If you have kidney, heart, or liver disease and have to limit fluids, talk with your doctor before you increase the amount of fluids you drink.  Get plenty of rest to help your body heal.  Take an over-the-counter pain medicine, such as acetaminophen (Tylenol), ibuprofen (Advil, Motrin), or naproxen (Aleve), to reduce fever and discomfort. Read and follow all instructions on the label. Do not give aspirin to anyone younger than 20. It has been linked to Reye syndrome, a serious illness.  Do not take two or more pain medicines at the same time unless the doctor told you to. Many pain medicines have acetaminophen, which is Tylenol. Too much acetaminophen (Tylenol) can be  "harmful.  Try not to scratch the rash. Put cold, wet cloths on the rash to reduce itching.  Do not smoke. Smoking can make your symptoms worse. If you need help quitting, talk to your doctor about stop-smoking programs and medicines. These can increase your chances of quitting for good.  Avoid contact with people who have never had rubella and who have not been immunized.  When should you call for help?   Call your doctor now or seek immediate medical care if:    You have a fever with a stiff neck or a severe headache.     You are sensitive to light or feel very sleepy or confused.   Watch closely for changes in your health, and be sure to contact your doctor if:    You do not get better as expected.   Where can you learn more?  Go to https://www.[employer].net/patiented  Enter B812 in the search box to learn more about \"Rubella (Khmer Measles): Care Instructions.\"  Current as of: 2023               Content Version: 13.8    9079-8344 [employer].   Care instructions adapted under license by your healthcare professional. If you have questions about a medical condition or this instruction, always ask your healthcare professional. [employer] disclaims any warranty or liability for your use of this information.      Gonorrhea and Chlamydia: About These Tests  What is it?  These tests use a sample of urine or other body fluid to look for the bacteria that cause these sexually transmitted infections (STIs). The fluid sample can come from the cervix, vagina, rectum, throat, or eyes.  Why is this test done?  These tests may be done to:  Find out if symptoms are caused by gonorrhea or chlamydia.  Check people who are at high risk of being infected with gonorrhea or chlamydia.  Retest people several months after they have been treated for gonorrhea or chlamydia.  Check for infection in your  if you had a gonorrhea or chlamydia infection at the time of delivery.  How can you prepare " "for the test?  If you are going to have a urine test, do not urinate for at least 1 hour before the test.  If you think you may have chlamydia or gonorrhea, don't have sexual intercourse until you get your test results. And you may want to have tests for other STIs, such as HIV.  How is the test done?  For a direct sample, a swab is used to collect body fluid from the cervix, vagina, rectum, throat, or eyes. Your doctor may collect the sample. Or you may be given instructions on how to collect your own sample.  For a urine sample, you will collect the urine that comes out when you first start to urinate. Don't wipe the genital area clean before you urinate.  How long does the test take?  The test will take a few minutes.  What happens after the test?  You will be able to go home right away.  You can go back to your usual activities right away.  If you do have an infection, don't have sexual intercourse for 7 days after you start treatment. And your sex partner(s) should also be treated.  Follow-up care is a key part of your treatment and safety. Be sure to make and go to all appointments, and call your doctor if you are having problems. It's also a good idea to keep a list of the medicines you take. Ask your doctor when you can expect to have your test results.  Where can you learn more?  Go to https://www.[email or web address].net/patiented  Enter K976 in the search box to learn more about \"Gonorrhea and Chlamydia: About These Tests.\"  Current as of: April 19, 2023               Content Version: 13.8    2507-3142 [employer].   Care instructions adapted under license by your healthcare professional. If you have questions about a medical condition or this instruction, always ask your healthcare professional. [employer] disclaims any warranty or liability for your use of this information.      Trichomoniasis: About This Test  What is it?     This test uses a sample of urine or other body fluid to look " for the tiny parasite that causes trichomoniasis (also called trich). The fluid sample can come from the vagina, cervix, or urethra. Your doctor may choose to use one or more of many available tests.  Why is it done?  A trich test may be done to:  Find out if symptoms are caused by trich.  Check people who are at high risk for being infected with trich.  Check after treatment to make sure that the infection is gone.  How do you prepare for the test?  If you are going to have a urine test, do not urinate for at least 1 hour before the test.  How is the test done?  For a direct sample, a swab is used to collect body fluid from the cervix, vagina, or urethra. Your doctor may collect the sample. Or you may be given instructions on how to collect your own sample.  For a urine sample, you will collect the urine that comes out when you first start to urinate. Don't wipe the area clean before you urinate.  How long does the test take?  It will take a few minutes to collect a sample.  What happens after the test?  You can go home right away.  You can go back to your usual activities right away.  You may get the test results the same day or several days later. It depends on the test used.  If you do have an infection, don't have sexual intercourse for 7 days after you start treatment. Your sex partner(s) should also be treated.  Follow-up care is a key part of your treatment and safety. Be sure to make and go to all appointments, and call your doctor if you are having problems. Ask your doctor when you can expect to have your test results.  Current as of: April 19, 2023               Content Version: 13.8    8985-4100 [employer].   Care instructions adapted under license by your healthcare professional. If you have questions about a medical condition or this instruction, always ask your healthcare professional. [employer] disclaims any warranty or liability for your use of this  "information.      HIV Testing: Care Instructions  Overview  You can get tested for the human immunodeficiency virus (HIV). Most doctors use a blood test to check for HIV antibodies and antigens in your blood. It may also check for the genetic material (RNA) of HIV. Some tests use saliva to check for HIV antibodies. But these aren't as accurate. For example, they may give a false result if you've just been infected.  What do the results mean?    Normal (negative)    No HIV antibodies, antigens, or RNA were found.  You may need more testing. It can make sure your test results are correct.    Uncertain (indeterminate)    Test results didn't clearly show if you have an HIV infection.  HIV antibodies or antigens may not have formed yet.  Some other type of antibody or antigen may have affected the results.  You will need another test to be sure.    Abnormal (positive)    HIV antibodies, antigens, or RNA were found.  If you haven't had an RNA test yet, one will be done. If it's positive, you have HIV.  If your test result is positive, your doctor will talk to you. You will discuss starting treatment.  Follow-up care is a key part of your treatment and safety. Be sure to make and go to all appointments, and call your doctor if you are having problems. It's also a good idea to know your test results and keep a list of the medicines you take.  Where can you learn more?  Go to [email or web address]://[email or web address].net/patiented  Enter T792 in the search box to learn more about \"HIV Testing: Care Instructions.\"  Current as of: June 13, 2023               Content Version: 13.8    3577-9209 [employer].   Care instructions adapted under license by your healthcare professional. If you have questions about a medical condition or this instruction, always ask your healthcare professional. [employer] disclaims any warranty or liability for your use of this information.      Hepatitis C Virus Tests: About These Tests  What " "are they?     Hepatitis C virus tests are blood tests that check for substances in the blood that show whether you have hepatitis C now or had it in the past. The tests can also tell you what type of hepatitis C you have and how severe the disease is. This can help your doctor with treatment.  If the tests show that you have long-term hepatitis C, you need to take steps to prevent spreading the disease.  Why are these tests done?  You may need these tests if:  You have symptoms of hepatitis.  You may have been exposed to the virus. You are more likely to have been exposed to the virus if you inject drugs or are exposed to body fluids (such as if you are a health care worker).  You've had other tests that show you have liver problems.  You are 18 to 79 years old.  You have an HIV infection.  The tests also are done to help your doctor decide about your treatment and see how well it works.  How do you prepare for the test?  In general, there's nothing you have to do before this test, unless your doctor tells you to.  How is the test done?  A health professional uses a needle to take a blood sample, usually from the arm.  What happens after these tests?  You will probably be able to go home right away.  You can go back to your usual activities right away.  Follow-up care is a key part of your treatment and safety. Be sure to make and go to all appointments, and call your doctor if you are having problems. It's also a good idea to keep a list of the medicines you take. Ask your doctor when you can expect to have your test results.  Where can you learn more?  Go to https://www.[employer].net/patiented  Enter W551 in the search box to learn more about \"Hepatitis C Virus Tests: About These Tests.\"  Current as of: June 13, 2023               Content Version: 13.8    0827-9996 [employer].   Care instructions adapted under license by your healthcare professional. If you have questions about a medical condition " or this instruction, always ask your healthcare professional. Healthwise, [facility] disclaims any warranty or liability for your use of this information.      Learning About Fetal Ultrasound Results  What is a fetal ultrasound?     Fetal ultrasound is a test that lets your doctor see an image of your baby. Your doctor learns information about your baby from this picture. You may find out, for example, if you are having a boy or a girl. But the main reason you have this test is to get information about your baby's health.  (You may hear your baby called a fetus. This is a common medical term for a baby that's growing in the mother's uterus.)  What kind of information can you learn from this test?  The findings of an ultrasound fall into two categories, normal and abnormal.  Normal  The fetus is the right size for its age.  The placenta is the expected size and does not cover the cervix.  There is enough amniotic fluid in the uterus.  No birth defects can be seen.  Abnormal  The fetus is small or large for its age.  The placenta covers the cervix.  There is too much or too little amniotic fluid in the uterus.  The fetus may have a birth defect.  What does an abnormal result mean?  Abnormal seems to imply that something is wrong with your baby. But what it means is that the test has shown something the doctor wants to take a closer look at.  And that's what happens next. Your doctor will talk to you about what further test or tests you may need.  What do the results mean?  Some of the things your doctor may see on an abnormal ultrasound include:  Echogenic bowel.  The bowel looks very bright on the screen. This could mean that there's blood in the bowel. Or it could mean that something is blocking the small bowel.  Increased nuchal translucency.  The ultrasound measures the thickness at the back of the baby's neck. An increase in thickness is sometimes an early sign of Down syndrome.  Increased or decreased amniotic  "fluid.  The doctor will look for a reason for the level of amniotic fluid and will watch the pregnancy closely as it progresses.  Large ventricles.  Ventricles in the brain look larger than they should. Your doctor may take a closer look at the brain.  Renal pyelectasis/hydronephrosis.  The ultrasound measures the fluid around the kidney. If there is more fluid than expected, there is a chance of urinary tract or kidney problems.  Short long bones.  The ultrasound measures certain arm and leg bones. A long bone (humerus or femur) that is shorter than average could be a sign of Down syndrome.  Subchorionic hemorrhage.  An ultrasound can show bleeding under one of the membranes that surrounds the fetus. Some women don't have symptoms of bleeding. The ultrasound can find this problem when women are not bleeding from their vagina. Women who have this condition have a slightly higher chance of miscarriage.  What do you do now?  Take a deep breath, and let it out. Keep in mind that an abnormal finding on an ultrasound, after it's coupled with more information, may:  Turn out to be nothing.  Turn out to be something mild that won't affect the baby.  Turn out to be something more serious. But if this happens, early diagnosis helps you and your doctor plan treatment options sooner rather than later.  Your medical team is there for you. So are your family and friends. Ask questions, and get the help and support you need.  Follow-up care is a key part of your treatment and safety. Be sure to make and go to all appointments, and call your doctor if you are having problems. It's also a good idea to know your test results and keep a list of the medicines you take.  Where can you learn more?  Go to https://www.[employer].net/patiented  Enter K451 in the search box to learn more about \"Learning About Fetal Ultrasound Results.\"  Current as of: July 11, 2023               Content Version: 13.8    3694-6127 [employer]. "   Care instructions adapted under license by your healthcare professional. If you have questions about a medical condition or this instruction, always ask your healthcare professional. [employer] disclaims any warranty or liability for your use of this information.      Learning About Prenatal Visits  Overview     Regular prenatal visits are very important during any pregnancy. These quick office visits may seem simple and routine. But they can help you have a safe and healthy pregnancy. Your doctor is watching for problems that can only be found through regular checkups. The visits also give you and your doctor time to build a good relationship.  It's common to see your doctor every 4 weeks until week 28 of pregnancy. Then the visits will happen more often. From weeks 28 to 36, it's common to have visits every 2 to 3 weeks. In the final month of pregnancy, you likely will see your doctor every week. Your doctor may want to see you more or less often, depending on your health, your age, and if you've had a normal, full-term pregnancy before.  At different times in your pregnancy, you will have exams and tests. Some are routine. Others are done only when there is a chance of a problem. Everything healthy you do for your body helps you have a healthy pregnancy. Rest when you need it. Eat well, drink plenty of water, and exercise regularly.  What happens during a prenatal visit?  You will have blood pressure checks, along with urine tests. You also may have blood tests. If you need to go to the bathroom while waiting for the doctor, tell the nurse. You will be given a sample cup so your urine can be tested.  You will be weighed and have your belly measured.  Your doctor may listen to the fetal heartbeat with a special device.  At about 24 weeks, and possibly earlier in your pregnancy, your doctor will check your blood sugar (glucose tolerance test) for diabetes that can occur during pregnancy. This is  gestational diabetes, which can be harmful.  You will have tests to check for infections that could harm your . These include group B streptococcus and hepatitis B.  Your doctor may do ultrasounds to check for problems. This also checks the position of the fetus. An ultrasound uses sound waves to produce a picture of the fetus.  You may get your vaccines updated.  Your doctor may ask you questions to check for signs of anxiety or depression. Tell your doctor if you feel sad, anxious, or hopeless for more than a few days.  You may have other tests at any time during your pregnancy.  Use your visits to discuss with your doctor any concerns you have.  How can you care for yourself at home?  Get plenty of rest.  Try to exercise every day, if your doctor says it is okay. If you have not exercised in the past, start out slowly. For example, you can take short walks each day.  Choose healthy foods, such as fruits, vegetables, whole grains, lean proteins, low-fat dairy, and healthy fats.  Drink plenty of fluids. Cut down on drinks with caffeine, such as coffee, tea, and cola. If you have kidney, heart, or liver disease and have to limit fluids, talk with your doctor before you increase the amount of fluids you drink.  Try to avoid chemical fumes, paint fumes, and poisons.  If you smoke, vape, or use alcohol, marijuana, or other drugs, quit or cut back as much as you can. Talk to your doctor if you need help quitting.  Review all of your medicines, including over-the-counter medicines and supplements, with your doctor. Some of your routine medicines may need to be changed. Do not stop or start taking any medicines without talking to your doctor first.  Follow-up care is a key part of your treatment and safety. Be sure to make and go to all appointments, and call your doctor if you are having problems. It's also a good idea to know your test results and keep a list of the medicines you take.  Where can you learn  "more?  Go to https://www.[employer].net/patiented  Enter J502 in the search box to learn more about \"Learning About Prenatal Visits.\"  Current as of: July 11, 2023               Content Version: 13.8    3289-6207 [employer].   Care instructions adapted under license by your healthcare professional. If you have questions about a medical condition or this instruction, always ask your healthcare professional. [employer] disclaims any warranty or liability for your use of this information.      Intimate Partner Violence: Care Instructions  Overview     If you want to save this information but don't think it is safe to take it home, see if a trusted friend can keep it for you. Plan ahead. Know who you can call for help, and memorize the phone number.   Be careful online too. Your online activity may be seen by others. Do not use your personal computer or device to read about this topic. Use a safe computer, such as one at work, a friend's home, or a library.    Intimate partner violence--a type of domestic abuse--is different from an argument now and then. It is a pattern of abuse that one person may use to control another person's behavior. It may start with threats and name-calling. Then, it may lead to more serious acts, like pushing and slapping. The abuse also may occur in other areas. For example, the abuser may withhold money or spend a partner's money without their knowledge.  Abuse can cause serious harm. You are more likely to have a long-term health problem from the injuries and stress of living in a violent relationship. People who are sexually abused by their partners have more sexually transmitted infections and unplanned pregnancies. Anyone who is abused also faces emotional pain. Anyone can be abused in relationships. In some relationships, both people use abusive behavior.  If you are pregnant, abuse can cause problems such as poor weight gain, infections, and bleeding. Abuse " "during this time may increase your baby's risk of low birth weight, premature birth, and death.  Follow-up care is a key part of your treatment and safety. Be sure to make and go to all appointments, and call your doctor if you are having problems. It's also a good idea to know your test results and keep a list of the medicines you take.  How can you care for yourself at home?  If you do not have a safe place to stay, discuss this with your doctor before you leave.  Have a plan for where to go, how to leave your home, and where to stay in case of an emergency. Do not tell your partner about your plan. Contact:  The National Domestic Violence Hotline toll-free at 1-872.720.5467. They can help you find resources in your area.  Your local police department, hospital, or clinic for information about shelters and safe homes near you.  Talk to a trusted friend or neighbor, a counselor, or a anupama leader. Do not feel that you have to hide what happened.  Teach your children how to call for help in an emergency.  Be alert to warning signs, such as threats, heavy alcohol use, or drug use. This can help you avoid danger.  If you can, make sure that there are no guns or other weapons in your home.  When should you call for help?   Call 911 anytime you think you may need emergency care. For example, call if:    You or someone else has just been abused.     You think you or someone else is in danger of being abused.   Watch closely for changes in your health, and be sure to contact your doctor if you have any problems.  Where can you learn more?  Go to https://www.[employer]Mel[employer].net/patiented  Enter G282 in the search box to learn more about \"Intimate Partner Violence: Care Instructions.\"  Current as of: June 25, 2023               Content Version: 13.8    6525-9820 [employer].   Care instructions adapted under license by your healthcare professional. If you have questions about a medical condition or this instruction, " always ask your healthcare professional. Healthwise, [employer] disclaims any warranty or liability for your use of this information.      Intimate Partner Violence Safety Instructions: Care Instructions  Overview     If you want to save this information but don't think it is safe to take it home, see if a trusted friend can keep it for you. Plan ahead. Know who you can call for help, and memorize the phone number.   Be careful online too. Your online activity may be seen by others. Do not use your personal computer or device to read about this topic. Use a safe computer, such as one at work, a friend's home, or a library.    When you are abused by a spouse or partner, you can take actions to protect yourself and your children.  You can increase your safety whether you decide to stay with your spouse or partner or you decide to leave. You may want to make a safety plan and pack a bag ahead of time. This will help you leave quickly when you decide to. Remember, you cannot change your partner's actions, but you can find help for you and your children. No one deserves to be abused.  Follow-up care is a key part of your treatment and safety. Be sure to make and go to all appointments, and call your doctor if you are having problems. It's also a good idea to know your test results and keep a list of the medicines you take.  How can you care for yourself at home?  Make a plan for your safety   If you decide to stay with your abusive spouse or partner, you can do the following to increase your safety:  Decide what works best to keep you safe in an emergency.  Know who you can call to help you in an emergency.  Decide if you will call the police if you get hurt again. If you can, agree on a signal with your children or neighbor to call the police for you if you need help. You can flash lights or hang something out of a window.  Choose a safe place to go for a short time if you need to leave home. Memorize the address and  phone number.  Learn escape routes out of your home in case you need to leave in a hurry. Teach your children different ways to get out of your home quickly if they need to.  If you can, hide or lock up things that can be used as weapons (guns, knives, hammers).  Learn the number of a domestic violence shelter. Talk to the people there about how they can help.  Find out about other community resources that can help you.  Take pictures of bruises or other injuries if you can. You can also take pictures of things your abuser has broken.  Teach your children that violence is never okay. Tell them that they do not deserve to be hurt.  Pack a bag   Prepare a bag with things you will need if you leave suddenly. Leave it with a friend, a relative, or someone else you trust. You could include the following items in the bag:  A set of keys to your home and car.  Emergency phone numbers and addresses.  Money such as cash or checks. You can also ask a friend, a relative, or someone else you trust to hold money for you.  Copies of legal documents such as house and car titles or rent receipts, birth certificates, Social Security card, voter registration, marriage and 's licenses, and your children's health records.  Personal items you would need for a few days, such as clothes, a toothbrush, toothpaste, and any medicines you or your children need.  A favorite toy or book for your child or children.  Diapers and bottles, if you have very young children.  Pictures that show signs of abuse and violence. You may also add pictures of your abuser.  If you leave   If you decide to leave, you can take the following steps:  Go to the emergency room at a hospital if you have been hurt.  Think about asking the police to be with you as you leave. They can protect you as you leave your home.  If you decide to leave secretly, remember that activities can be tracked. Your abuser may still have access to your cell phone, email, and credit  "cards. It may be possible for these to be traced. Always be aware of your surroundings.  If this is an emergency, do not worry about gathering up anything. Just leave--your safety is most important.  If your abuser moves out, change the locks on the doors. If you have a security system, change the access code.  When should you call for help?   Call 911 anytime you think you may need emergency care. For example, call if:    You or someone else has just been abused.     You think you or someone else is in danger of being abused.   Watch closely for changes in your health, and be sure to contact your doctor if you have any problems.  Where can you learn more?  Go to https://www.[employer].net/patiented  Enter A752 in the search box to learn more about \"Intimate Partner Violence Safety Instructions: Care Instructions.\"  Current as of: June 25, 2023               Content Version: 13.8    0670-2949 [employer].   Care instructions adapted under license by your healthcare professional. If you have questions about a medical condition or this instruction, always ask your healthcare professional. [employer] disclaims any warranty or liability for your use of this information.      Learning About Intimate Partner Violence  What is intimate partner violence?  Intimate partner violence is a type of domestic abuse. It's threatening, emotionally harmful, or violent behavior in a personal relationship. It can happen between past or current partners or spouses. In some relationships both people abuse each other. One partner may be more abusive. Or the abuse may be equal.  Abuse can affect people of any ethnic group, race, or Gnosticism. It can affect teens, adults, or the elderly. And it can happen to people of any sexual orientation, gender, or social status.  Abusers use fear, bullying, and threats to control their partners. They may control what their partners do. They may control where their partners go " or who they see. They may act jealous, controlling, or possessive. These early signs of abuse may happen soon after the start of the relationship. Sometimes it can be hard to notice abuse at first. But after the relationship becomes more serious, the abuse may get worse.  If you are being abused in your relationship, it's important to get help. The abuse is not your fault. You don't have to face it alone.  Be careful  It may not be safe to take home domestic abuse information like this handout. Some people ask a trusted friend to keep it for them. It's also important to plan ahead and to memorize the phone number of places you can go for help. If you are concerned about your safety, do not use your computer, smartphone, or tablet to read about domestic abuse.   What are the types of intimate partner violence?  Abuse can happen in different ways. Each type can happen on its own or in combination with others.  Emotional abuse  Emotional abuse is a pattern of threats, insults, or controlling behavior. It includes verbal abuse. It goes beyond healthy disagreements in a relationship. It's a sign of an unhealthy relationship.  Do you feel threatened, intimidated, or controlled?  Does your partner:  Threaten your children, other family members, or pets?  Use jokes meant to embarrass or shame you?  Call you names?  Tell you that you are a bad parent?  Threaten to take away your children?  Threaten to have you or your family members deported?  Control your access to money or other basic needs?  Control what you do, who you see or talk to, or where you go?  Another form of emotional abuse is denying that it is happening. Or the abuser may act like the abuse is no big deal or is your fault.  Sexual abuse  With sexual abuse, abusers may try to convince or force you to have sex. They may force you into sex acts you're not comfortable with. Or they may sexually assault you. Sexual abuse can happen even if you are in a committed  relationship.  Physical abuse  Physical abuse means that a partner hits, kicks, or does something else to physically hurt you. Physical abuse that starts with a slap might lead to kicking, shoving, and choking over time. The abuser may also threaten to hurt or kill you.  Stalking  Stalking means that an abuser gives you attention that you do not want and that causes you fear. Examples of stalking include:  Following you.  Showing up at places where the abuser isn't invited, such as at your work or school.  Constantly calling or texting you.  What problems can  to?  Intimate partner violence can be very dangerous. It can cause serious, repeated injury. It can even lead to death.  All forms of abuse can cause long-term health problems from the stress of a violent relationship. Verbal abuse can lead to sexual and physical abuse.  Abuse causes:  Emotional pain.  Depression.  Anxiety.  Post-traumatic stress.  Sexual abuse can lead to sexually transmitted infections (such as HIV/AIDS) and unplanned pregnancy.  Pregnancy can be a very dangerous time for people in abusive relationships. Abuse can cause or increase the risk of problems during pregnancy. These include low weight gain, anemia, infections, and bleeding. Abuse may also increase your baby's risk of low birth weight, premature birth, and death.  It can be hard for some victims of abuse to ask for help or to leave their relationship. You may feel scared, stuck, or not sure what steps to take. But it's important not to ignore abuse. Talking to someone you trust could be the first step to

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
## 2023-12-21 LAB
ABO/RH(D): NORMAL
ANTIBODY SCREEN: NEGATIVE
SPECIMEN EXPIRATION DATE: NORMAL

## 2023-12-22 ENCOUNTER — ANCILLARY PROCEDURE (OUTPATIENT)
Dept: ULTRASOUND IMAGING | Facility: CLINIC | Age: 38
End: 2023-12-22
Payer: COMMERCIAL

## 2023-12-22 ENCOUNTER — PRENATAL OFFICE VISIT (OUTPATIENT)
Dept: OBGYN | Facility: CLINIC | Age: 38
End: 2023-12-22
Payer: COMMERCIAL

## 2023-12-22 VITALS
HEIGHT: 68 IN | WEIGHT: 220 LBS | DIASTOLIC BLOOD PRESSURE: 72 MMHG | BODY MASS INDEX: 33.34 KG/M2 | SYSTOLIC BLOOD PRESSURE: 116 MMHG

## 2023-12-22 DIAGNOSIS — O09.91 SUPERVISION OF HIGH RISK PREGNANCY IN FIRST TRIMESTER: ICD-10-CM

## 2023-12-22 DIAGNOSIS — O36.80X0 PREGNANCY WITH INCONCLUSIVE FETAL VIABILITY: ICD-10-CM

## 2023-12-22 LAB
ALBUMIN UR-MCNC: NEGATIVE MG/DL
APPEARANCE UR: CLEAR
BILIRUB UR QL STRIP: NEGATIVE
COLOR UR AUTO: YELLOW
ERYTHROCYTE [DISTWIDTH] IN BLOOD BY AUTOMATED COUNT: 11.8 % (ref 10–15)
GLUCOSE UR STRIP-MCNC: NEGATIVE MG/DL
HCT VFR BLD AUTO: 38.6 % (ref 35–47)
HGB BLD-MCNC: 12.9 G/DL (ref 11.7–15.7)
HGB UR QL STRIP: NEGATIVE
KETONES UR STRIP-MCNC: ABNORMAL MG/DL
LEUKOCYTE ESTERASE UR QL STRIP: NEGATIVE
MCH RBC QN AUTO: 28.7 PG (ref 26.5–33)
MCHC RBC AUTO-ENTMCNC: 33.4 G/DL (ref 31.5–36.5)
MCV RBC AUTO: 86 FL (ref 78–100)
NITRATE UR QL: NEGATIVE
PH UR STRIP: 5.5 [PH] (ref 5–7)
PLATELET # BLD AUTO: 317 10E3/UL (ref 150–450)
RBC # BLD AUTO: 4.49 10E6/UL (ref 3.8–5.2)
RUBV IGG SERPL QL IA: 3.34 INDEX
RUBV IGG SERPL QL IA: POSITIVE
SP GR UR STRIP: 1.02 (ref 1–1.03)
T PALLIDUM AB SER QL: NONREACTIVE
UROBILINOGEN UR STRIP-ACNC: 0.2 E.U./DL
WBC # BLD AUTO: 6.9 10E3/UL (ref 4–11)

## 2023-12-22 PROCEDURE — 86850 RBC ANTIBODY SCREEN: CPT | Performed by: OBSTETRICS & GYNECOLOGY

## 2023-12-22 PROCEDURE — 81003 URINALYSIS AUTO W/O SCOPE: CPT | Performed by: OBSTETRICS & GYNECOLOGY

## 2023-12-22 PROCEDURE — 87340 HEPATITIS B SURFACE AG IA: CPT | Performed by: OBSTETRICS & GYNECOLOGY

## 2023-12-22 PROCEDURE — 87389 HIV-1 AG W/HIV-1&-2 AB AG IA: CPT | Performed by: OBSTETRICS & GYNECOLOGY

## 2023-12-22 PROCEDURE — 99214 OFFICE O/P EST MOD 30 MIN: CPT | Mod: 25 | Performed by: OBSTETRICS & GYNECOLOGY

## 2023-12-22 PROCEDURE — 86901 BLOOD TYPING SEROLOGIC RH(D): CPT | Performed by: OBSTETRICS & GYNECOLOGY

## 2023-12-22 PROCEDURE — 36415 COLL VENOUS BLD VENIPUNCTURE: CPT | Performed by: OBSTETRICS & GYNECOLOGY

## 2023-12-22 PROCEDURE — 86900 BLOOD TYPING SEROLOGIC ABO: CPT | Performed by: OBSTETRICS & GYNECOLOGY

## 2023-12-22 PROCEDURE — 86803 HEPATITIS C AB TEST: CPT | Performed by: OBSTETRICS & GYNECOLOGY

## 2023-12-22 PROCEDURE — 85027 COMPLETE CBC AUTOMATED: CPT | Performed by: OBSTETRICS & GYNECOLOGY

## 2023-12-22 PROCEDURE — 86762 RUBELLA ANTIBODY: CPT | Performed by: OBSTETRICS & GYNECOLOGY

## 2023-12-22 PROCEDURE — 86780 TREPONEMA PALLIDUM: CPT | Performed by: OBSTETRICS & GYNECOLOGY

## 2023-12-22 PROCEDURE — 87086 URINE CULTURE/COLONY COUNT: CPT | Performed by: OBSTETRICS & GYNECOLOGY

## 2023-12-22 PROCEDURE — 76817 TRANSVAGINAL US OBSTETRIC: CPT | Performed by: OBSTETRICS & GYNECOLOGY

## 2023-12-22 NOTE — PATIENT INSTRUCTIONS
Avoid alcoholic beverages.  Discussed advanced maternal age and testing options.  Patient is above age 36 at delivery.  Explained risks of chromosomal abnormalities and age risks.  Discussed amnio versus non-invasive testing and their individual limitations and risks.  Patient would like non-invasive testing to start with.  Understands Invitae is a screenting test and not a diagnostic test.   Reviewed ultrasound findings today with dating discrepancy based on LMP.  Likely simply mis-dating due to longer cycles and presumed date of conception.  Will repeat viability ultrasound in 3-4 weeks to establish dating.

## 2023-12-23 LAB
BACTERIA UR CULT: NO GROWTH
HBV SURFACE AG SERPL QL IA: NONREACTIVE
HCV AB SERPL QL IA: NONREACTIVE
HIV 1+2 AB+HIV1 P24 AG SERPL QL IA: NONREACTIVE

## 2023-12-27 ENCOUNTER — OFFICE VISIT (OUTPATIENT)
Dept: URGENT CARE | Facility: URGENT CARE | Age: 38
End: 2023-12-27
Payer: COMMERCIAL

## 2023-12-27 ENCOUNTER — TELEPHONE (OUTPATIENT)
Dept: OBGYN | Facility: CLINIC | Age: 38
End: 2023-12-27

## 2023-12-27 VITALS
OXYGEN SATURATION: 97 % | RESPIRATION RATE: 18 BRPM | TEMPERATURE: 97.8 F | SYSTOLIC BLOOD PRESSURE: 106 MMHG | HEART RATE: 91 BPM | WEIGHT: 218 LBS | DIASTOLIC BLOOD PRESSURE: 74 MMHG | BODY MASS INDEX: 33.15 KG/M2

## 2023-12-27 DIAGNOSIS — R05.1 ACUTE COUGH: ICD-10-CM

## 2023-12-27 DIAGNOSIS — R07.0 THROAT PAIN: ICD-10-CM

## 2023-12-27 DIAGNOSIS — R50.9 FEVER, UNSPECIFIED FEVER CAUSE: ICD-10-CM

## 2023-12-27 DIAGNOSIS — J01.90 ACUTE SINUSITIS WITH SYMPTOMS > 10 DAYS: Primary | ICD-10-CM

## 2023-12-27 DIAGNOSIS — Z33.1 PREGNANCY, INCIDENTAL: ICD-10-CM

## 2023-12-27 LAB
DEPRECATED S PYO AG THROAT QL EIA: NEGATIVE
FLUAV AG SPEC QL IA: NEGATIVE
FLUBV AG SPEC QL IA: NEGATIVE
GROUP A STREP BY PCR: NOT DETECTED

## 2023-12-27 PROCEDURE — 99214 OFFICE O/P EST MOD 30 MIN: CPT | Performed by: PHYSICIAN ASSISTANT

## 2023-12-27 PROCEDURE — 87804 INFLUENZA ASSAY W/OPTIC: CPT | Performed by: PHYSICIAN ASSISTANT

## 2023-12-27 PROCEDURE — 87651 STREP A DNA AMP PROBE: CPT | Performed by: PHYSICIAN ASSISTANT

## 2023-12-27 PROCEDURE — 87635 SARS-COV-2 COVID-19 AMP PRB: CPT | Performed by: PHYSICIAN ASSISTANT

## 2023-12-27 NOTE — TELEPHONE ENCOUNTER
Returned patient call.  Pt aware that she can take augmentin in pregnancy  Pt verbalized understanding, in agreement with plan, and voiced no further questions.  Haylee Ann RN on 12/27/2023 at 4:06 PM

## 2023-12-27 NOTE — TELEPHONE ENCOUNTER
Reason for call:  Other   Patient called regarding (reason for call): call back  Additional comments: patient is 6 weeks pregnant and has very bad cold symptoms. She went to urgent care and they gave her amoxicillin-clavulanate (AUGMENTIN) 875-125 MG tablet and she just wants to make sure it's safe to take.    Phone number to reach patient:  Cell number on file:    Telephone Information:   Mobile [phone]     Best Time:  any    Can we leave a detailed message on this number?  YES    Travel screening: Not Applicable
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
## 2023-12-28 LAB — SARS-COV-2 RNA RESP QL NAA+PROBE: POSITIVE

## 2023-12-31 ENCOUNTER — NURSE TRIAGE (OUTPATIENT)
Dept: NURSING | Facility: CLINIC | Age: 38
End: 2023-12-31
Payer: COMMERCIAL

## 2023-12-31 NOTE — TELEPHONE ENCOUNTER
"Pt is 6 weeks pregnant and is spotting, started last night and stopped and started up again today, notice on toilet tissue.  Mom will follow Care Advice and follow up with clinic if anything needed further.  Liz Bruno RN  [facility]NA Nurse Advisor    Reason for Disposition   SPOTTING (single or brief episode)    Additional Information   Negative: Shock suspected (e.g., cold/pale/clammy skin, too weak to stand, low BP, rapid pulse)   Negative: Difficult to awaken or acting confused (e.g., disoriented, slurred speech)   Negative: Passed out (i.e., lost consciousness, collapsed and was not responding)   Negative: Sounds like a life-threatening emergency to the triager   Negative: [1] Vaginal bleeding AND [2] pregnant 20 or more weeks   Negative: Not pregnant or pregnancy status unknown   Negative: SEVERE abdominal pain   Negative: SEVERE vaginal bleeding (e.g., soaking 2 pads per hour or large blood clots and present 2 or more hours)   Negative: SEVERE dizziness (e.g., unable to stand, requires support to walk, feels like passing out)   Negative: [1] MODERATE vaginal bleeding (e.g., soaking 1 pad per hour; clots) AND [2] present > 6 hours   Negative: [1] MODERATE vaginal bleeding (e.g., soaking 1 pad per hour; clots) AND [2] pregnant > 12 weeks   Negative: Passed tissue (e.g., gray-white)   Negative: Shoulder pain   Negative: Pale skin (pallor) of new-onset or worsening   Negative: Patient sounds very sick or weak to the triager   Negative: [1] Constant abdominal pain AND [2] present > 2 hours   Negative: Fever 100.4 F (38.0 C) or higher   Negative: [1] Intermittent lower abdominal pain (e.g., cramping) AND [2] present > 24 hours   Negative: Prior history of \"ectopic pregnancy\" or previous tubal surgery (e.g., tubal ligation)   Negative: Pain or burning with passing urine (urination)   Negative: Using heparin (e.g., Lovenox) or other strong blood thinner, or known bleeding disorder (e.g., thrombocytopenia)   Negative: " MODERATE vaginal bleeding (e.g., soaking 1 pad per hour; clots)   Negative: Has IUD   Negative: MILD vaginal bleeding (i.e., less than 1 pad / hour; less than patient's usual menstrual bleeding; not just spotting)   Negative: SPOTTING lasts > 48 hours or spotting happens more than once in a week   Negative: Unusual vaginal discharge (e.g., bad smelling, yellow, green, or foamy-white)   Negative: Not feeling pregnant any longer (e.g., breast tenderness or nausea has disappeared)   Negative: SPOTTING after sexual intercourse (single or brief episode)   Negative: SPOTTING after a pelvic examination (single or brief episode)    Protocols used: Pregnancy - Vaginal Bleeding Less Than 20 Weeks [facility]-

## 2024-01-04 ENCOUNTER — VIRTUAL VISIT (OUTPATIENT)
Dept: OBGYN | Facility: CLINIC | Age: 39
End: 2024-01-04
Payer: COMMERCIAL

## 2024-01-04 ENCOUNTER — ANCILLARY PROCEDURE (OUTPATIENT)
Dept: ULTRASOUND IMAGING | Facility: CLINIC | Age: 39
End: 2024-01-04
Attending: OBSTETRICS & GYNECOLOGY
Payer: COMMERCIAL

## 2024-01-04 DIAGNOSIS — O20.9 FIRST TRIMESTER BLEEDING: ICD-10-CM

## 2024-01-04 DIAGNOSIS — O20.9 FIRST TRIMESTER BLEEDING: Primary | ICD-10-CM

## 2024-01-04 PROCEDURE — 99441 PR PHYSICIAN TELEPHONE EVALUATION 5-10 MIN: CPT | Performed by: OBSTETRICS & GYNECOLOGY

## 2024-01-04 PROCEDURE — 76817 TRANSVAGINAL US OBSTETRIC: CPT | Performed by: OBSTETRICS & GYNECOLOGY

## 2024-01-05 NOTE — PROGRESS NOTES
SUBJECTIVE:                                                   Jose L Collazo is a 38 year old female who presents to clinic today for the following health issue(s):  Patient presents with:  Ultrasound: Viability ultrasound      HPI:  ***    Patient's last menstrual period was 10/26/2023..     Patient is sexually active, .  Using none for contraception.    reports that she has never smoked. She has never used smokeless tobacco.    STD testing offered?  Declined    Health maintenance updated:  yes    Today's PHQ-2 Score:       2024    10:22 AM   PHQ-2 (  [employer])   Q1: Little interest or pleasure in doing things 0   Q2: Feeling down, depressed or hopeless 0   PHQ-2 Score 0     Problem list and histories reviewed & adjusted, as indicated.  Additional history: as documented.    Patient Active Problem List   Diagnosis     Cervical high risk HPV (human papillomavirus) test positive     Endocervical adenocarcinoma (H)     Uses oral contraception     Supervision of high-risk pregnancy of elderly multigravida     Past Surgical History:   Procedure Laterality Date     CONIZATION N/A 2019    Procedure: CERVICAL CONIZATION;  Surgeon: Matt Ray MD;  Location:  OR     CONIZATION N/A 2019    Procedure: CERVICAL CONIZATION;  Surgeon: Matt Ray MD;  Location:  OR     DILATION AND CURETTAGE N/A 2019    Procedure: ENDOCERVICAL CURETTAGE;  Surgeon: Matt Ray MD;  Location:  OR     DILATION AND CURETTAGE N/A 2019    Procedure: ENDOCERVICAL CURETTAGE;  Surgeon: Matt Ray MD;  Location:  OR      TOOTH EXTRACTION W/FORCEP       MAMMOPLASTY REDUCTION  2017      Social History     Tobacco Use     Smoking status: Never     Smokeless tobacco: Never   Substance Use Topics     Alcohol use: No     Comment: has not had a drink in about 10 yrs      Problem (# of Occurrences) Relation (Name,Age of Onset)    Cancer (1) Maternal Grandfather: multiple  melanoma ? r/t exposure to Agent Orange    Lung Cancer (2) Maternal Grandfather, Paternal Grandmother    No Known Problems (5) Mother, Father, Maternal Grandmother, Paternal Grandfather, Other       Negative family history of: Asthma, C.A.D., Diabetes, Hypertension            Current Outpatient Medications   Medication Sig     clobetasol (TEMOVATE) 0.05 % CREA cream      famotidine (PEPCID) 10 MG tablet Take 10 mg by mouth 2 times daily     fluocinonide (LIDEX) 0.05 % external solution      Prenatal Vit-Fe Fumarate-FA (PRENATAL MULTIVITAMIN  PLUS IRON) 27-1 MG TABS Take by mouth daily     No current facility-administered medications for this visit.     Allergies   Allergen Reactions     Diphenhydramine Visual Disturbance     PN: LW Reaction: makes me crazy  Hallucinations, decreased motor control     Seasonal Allergies        ROS:  12 point review of systems negative other than symptoms noted below or in the HPI.  Genitourinary: Spotting  {ROS - :930682}      OBJECTIVE:     /74   Wt 98.9 kg (218 lb)   LMP 10/26/2023   BMI 33.15 kg/m    Body mass index is 33.15 kg/m .    Exam:  {[facility] EXAM CHOICES:284672}     In-Clinic Test Results:  Results for orders placed or performed in visit on 01/09/24 (from the past 24 hour(s))   US OB Transvaginal Only    Narrative    Obstetrical Ultrasound Report  OB U/S  < 14 Weeks -  Transvaginal  [facility]  Referring Provider: Deanne Mattson MD   Sonographer: Brittnee Spurling, RDMS  Indication:  Viability check      Dating (mm/dd/yyyy):   LMP: 10/26/2023            EDC:  08/01/2024            GA by LMP:           10w5d     Current Scan On:  1/9/2024             EDC:  09/05/2024            GA by Current Scan:          5w5d  The calculation of the gestational age by current scan was based on CRL.  Anatomy Scan:  Johnson gestation.  Biometry:  CRL                       0.2 cm                  5w5d                      Yolk Sac                               Not seen                                                                 Fetal heart activity:  Rate and rhythm is within normal limits.  Fetal   heart rate: 0 bpm  Findings: non-viable iup     Maternal Structures:  Cervix: The cervix appears long and closed.  Right Ovary: Wnl  Left Ovary: Corpus luteum  Technique:Transvaginal Imaging performed  Impression:      Early mcguire IUP with no cardiac activity seen, consistant with a   missed AB.  Measures 5+5d by today's ultrasound.    Deanne Mattson MD       ASSESSMENT/PLAN:                                                      No diagnosis found.    Patient Instructions   MISCARRIAGE MANAGEMENT USING MEDICATIONS     HOW DOES IT WORK?   Pills called mifepristone and misoprostol can help an early pregnancy loss to end faster.     HOW WELL DOES THE MEDICATION WORK?   These pills work 84% of the time to complete the miscarriage within two days and 89% of the time within a week. If you can t get mifepristone, misoprostol works 67% of the time within two days and 84% of the time within a week when used alone.     IS IT SAFE?   Yes, pills for pregnancy loss are safe. Using pills does not lower your chance of getting pregnant again.     WHAT DO I DO?   You took one tablet of 200 mg mifepristone today during your visit.  About 24 hours after taking the mifepristone, use the misoprostol at home. Choose a time when you have had a good meal and plenty of rest.   Swallow ibuprofen (600 mg total) one hour before using the misoprostol. This will help with side effects.   Vaginal Use of Misoprostol  Wash your hands and lie down. Push the four misoprostol tablets one at a time up into the vagina as far as you can using your finger. It doesn t matter where in the vagina you put the pills. Each misoprostol pill contains 200 micrograms.   Keep lying down for 30 minutes.   After 30 minutes, you can move around as usual. If the tablets come out after 30 minutes, it is OK. Your body has  absorbed the medication.        Buccal Use of Misoprostol  Place 2 tablets of misoprostol in each cheek (between your cheek and your gums)  Let these tablets dissolve, whatever has not dissolved within 30 minutes can be swallowed with a large glass of water  If you do not start to bleed within 24 hours give yourself a second dose of misoprostol. Do this by repeating step 4 or 5 above.     WHAT HAPPENS NEXT?  Cramps and bleeding with clots are an expected part of this process. The cramps and bleeding may be stronger than your normal period. The cramps usually start 2 to 4 hours after you use the misoprostol pills and may last 3 to 5 hours. You may start to have vaginal bleeding before you use the pills I have given you. This heavy bleeding is not risky. It means the pills are working. The bleeding often lasts 1 to 2 weeks, and may stop and start a few times.   Nausea, diarrhea, or chills: These symptoms should get better a few hours after using the pills and should not last longer than 24 hours.   You need to have an appointment in the clinic in 1-2 weeks. This was made for you today. At this visit we will ensure that the treatment was effective. If it was not effective, we will talk about next steps.        WHAT CAN I TAKE FOR PAIN, NAUSEA, DIARRHEA?   Pain:   Take ibuprofen (Motrin or Advil) 800 mg every 8 hours as needed for pain. Take this with food to avoid an upset stomach.   You may receive a stronger, narcotic pain medicine. You can use this if you still have pain after taking ibuprofen. Follow the directions on the label.   Comfort: A hot pack may help with cramps. You can also drink some hot tea. Rest in a soothing place.   Nausea  Take ondansetron or promethazine as needed for nausea and vomiting as needed for nausea and vomiting.   Diarrhea  Take Loperamide as needed for diarrhea. Take 2 capsules after the first loose bowel movement. Can take 1 capsule after each additional loose stool. Do not take more  than 8 capsules in a day.    WHAT IF IT TAKES TOO LONG OR DOESN T WORK?   If it doesn t work or you feel it is taking too long, please call the clinic and we can discuss the next steps.    WHEN SHOULD I CALL OR RETURN TO MY HEALTH CARE PROVIDER?   Your bleeding soaks through more than 2 maxi pads per hour for 2 hours in a row.  You have a fever on an oral thermometer of 100.4 degrees Fahrenheit   You have severe stomach area (abdominal) pain   You continue to feel sick 24 hours after taking the misoprostol, this includes stomach pain or discomfort, weakness, nausea, vomiting, or diarrhea    The clinic phone is answered 24 hours per day. If it is after clinic hours, leave a message with the answering service and a physician will call you back. Please call if you have any questions, think something is going wrong, or think you have an emergency. No question is too small. If you do not receive a call back within an hour, go to the nearest emergency room.     HOW SOON CAN I GET PREGNANT AGAIN?   You can get pregnant soon after the pregnancy loss ends. If you want to try again, take a prenatal vitamin daily. If you are not ready to be pregnant at this time, please see your clinician for your birth control options.     FEELINGS AFTER EARLY PREGNANCY LOSS   Most women feel mixed emotions after a miscarriage. It often depends upon how much time you had to adjust to the news of the pregnancy loss. These up and down feelings are partly from the changes in hormones. Please understand that nothing you did caused the pregnancy loss. It is NOT caused by stress, sports, food, or sex. Feeling emotional at this time is normal. If you think your emotions are not what they should be, please talk to us.              ***    Deanne Mattson MD  [facility]      Confirmed Early Pregnancy Loss - Outpatient Visit    Subjective:  Jose L Collazo is a 39 year old female coming today for management of  "likely early pregnancy loss. She has noticed the following:  - loss of pregnancy symptoms  - lower abdominal pain  - vaginal spotting/bleeding ***  Patient diagnosed with early pregnancy loss at the following time/location: ***.   {note that patient may not be aware of diagnosis if results haven't been discussed over the phone or emergency department. Be mindful of this and ask patient what their understanding is of what is going on with the pregnancy}    ROS: All other review of symptoms was otherwise negative except as noted in HPI    Dating:  LMP: Patient's last menstrual period was 10/26/2023.  Ultrasound results:   {obgyn ultrasound probe type:226488::\"vaginal\"}  Intrauterine gestational sac seen: {:003844}  Gestational sac summary: {:219532}  Fetal/Embryonic cardiac activity: {:884338}  Crown-rump length: {:940689}  Adnexa: {:601543}  ***    {As applicable, this patient meets this diagnostic criteria for early pregnancy loss per [place] ([facility] 2013):  Crown-rump length of >/7mm and no heartbeat;   Mean sac diameter of >/25mm and no embryo;   Absence of embryo with heartbeat >/2wks after a scan that showed a gestational sac without a yolk sac;   Absence of embryo with heartbeat >/11 days after a scan that showed a gestational sac with a yolk sac.}    Serum beta hCG: {INCREASED DECREASED:047627} which {IS:043847} congruent with early pregnancy loss.   Hemoglobin: ***  RH Status (if >8wks GA): ***    OB Hx:  OB History    Para Term  AB Living   3 1 1 0 1 1   SAB IAB Ectopic Multiple Live Births   1 0 0 0 1      # Outcome Date GA Lbr [place]/2nd Weight Sex Delivery Anes PTL Lv   3 Current            2 Term 22 39w6d / 00:50 3.22 kg (7 lb 1.6 oz) F Vag-Spont EPI N SERGIO      Name: Katie      Apgar1: 8  Apgar5: 9   1 SAB 2021               Objective:  /74   Wt 98.9 kg (218 lb)   LMP 10/26/2023   BMI 33.15 kg/m    {SMI Vitals:598602}    Gen: well-appearing, cooperative, well-groomed  Abd: " "***  : cervical os {IS:161106} open, blood {IS:513991} present in vaginal canal, and blood/POC {Actions; is/are/not:630619} present in cervical os; Bimanual exam shows unilateral tenderness {PRESENT/ABSENT:911891} and ~ {NUMBERS 1-20:456924::\"6\"}-week uterus    Assessment and Plan:  First-trimester pregnancy loss - this based on meeting [place] ultrasound criteria {AND/OR:908358} serum beta-hCG levels.    We reviewed the patient's options at length including the risks and benefits or expectant management, medical management using misoprostol alone, mifepristone and misoprostol, and aspiration procedure with local anesthesia and suction evacuation. Patient {IS:899039} vitally stable and {IS:966103} appropriate to manage as an outpatient.    The patient has elected and is appropriate for medical management with {MTOP meds:143766}  {contraindication to medication management: unstable medical conditions, vitally unstable, hemoglobin < 9, intrauterine device in place, ectopic pregnancy or undiagnosed adnexal mass, hemorrhagic disorder, porphyrias or concurrent anticoagulant therapy;  contraindications to misoprostol: drug allergy to misoprostol or other prostaglandins;   contraindications to mifepristone: drug allergy to misoprostol or other prostaglandins, chronic adrenal failure, and concurrent long-term corticosteroid therapy.}    Medication Management  - confirmed patient is within gestational age limit (11w6d or less)  - obtain serum hCG (if applicable)  - obtain hemoglobin and type and screen (if patent is >8wk GA and if not already known, or if not performed within the window of onset of bleeding or diagnosis of EPL)  - reviewed expected and serious side effects from mifepristone  - reviewed how to take misoprostol and expected side effects  - reviewed that mifepristone/misoprostol may not be effective and patient may require surgical management of miscarriage.   - confirmed and addressed any drug-drug " interactions with patient's concurrent medicines, including vitamins and herbal supplements  - provided prescription of ibuprofen for cramping, loperamide for diarrhea and a medicine for nausea  - RHOGAM administered (if Rh negative and >8 wks GA)  - call or return to clinic in 1-2 weeks if no bleeding or obvious passage of POC's has occurred  - chart routed to RN team who will follow up with patient via telephone in 1-2 days (or next business day) after clinic visit to assess patient      Joaquina Castro CMA

## 2024-01-09 ENCOUNTER — PRENATAL OFFICE VISIT (OUTPATIENT)
Dept: OBGYN | Facility: CLINIC | Age: 39
End: 2024-01-09
Attending: OBSTETRICS & GYNECOLOGY
Payer: COMMERCIAL

## 2024-01-09 ENCOUNTER — ANCILLARY PROCEDURE (OUTPATIENT)
Dept: ULTRASOUND IMAGING | Facility: CLINIC | Age: 39
End: 2024-01-09
Attending: OBSTETRICS & GYNECOLOGY
Payer: COMMERCIAL

## 2024-01-09 ENCOUNTER — TELEPHONE (OUTPATIENT)
Dept: OBGYN | Facility: CLINIC | Age: 39
End: 2024-01-09

## 2024-01-09 VITALS — SYSTOLIC BLOOD PRESSURE: 124 MMHG | DIASTOLIC BLOOD PRESSURE: 74 MMHG | BODY MASS INDEX: 33.15 KG/M2 | WEIGHT: 218 LBS

## 2024-01-09 DIAGNOSIS — O02.1 MISSED ABORTION: Primary | ICD-10-CM

## 2024-01-09 DIAGNOSIS — O20.9 FIRST TRIMESTER BLEEDING: ICD-10-CM

## 2024-01-09 PROCEDURE — S0190 MIFEPRISTONE, ORAL, 200 MG: HCPCS | Performed by: OBSTETRICS & GYNECOLOGY

## 2024-01-09 PROCEDURE — 76817 TRANSVAGINAL US OBSTETRIC: CPT | Performed by: OBSTETRICS & GYNECOLOGY

## 2024-01-09 PROCEDURE — 99213 OFFICE O/P EST LOW 20 MIN: CPT | Mod: 25 | Performed by: OBSTETRICS & GYNECOLOGY

## 2024-01-09 RX ORDER — ONDANSETRON 4 MG/1
4 TABLET, FILM COATED ORAL EVERY 6 HOURS PRN
Qty: 10 TABLET | Refills: 0 | Status: SHIPPED | OUTPATIENT
Start: 2024-01-09

## 2024-01-09 RX ORDER — MISOPROSTOL 200 UG/1
800 TABLET ORAL ONCE
Qty: 4 TABLET | Refills: 1 | Status: SHIPPED | OUTPATIENT
Start: 2024-01-09 | End: 2024-01-09

## 2024-01-09 RX ORDER — MIFEPRISTONE 200 MG/1
200 TABLET ORAL ONCE
Status: CANCELLED | OUTPATIENT
Start: 2024-01-09 | End: 2024-01-09

## 2024-01-09 RX ORDER — IBUPROFEN 800 MG/1
800 TABLET, FILM COATED ORAL EVERY 8 HOURS PRN
Qty: 21 TABLET | Refills: 0 | Status: SHIPPED | OUTPATIENT
Start: 2024-01-09

## 2024-01-09 RX ORDER — MIFEPRISTONE 200 MG/1
200 TABLET ORAL ONCE
Status: COMPLETED | OUTPATIENT
Start: 2024-01-09 | End: 2024-01-09

## 2024-01-09 RX ADMIN — MIFEPRISTONE 200 MG: 200 TABLET ORAL at 10:46

## 2024-01-09 NOTE — PROGRESS NOTES
Confirmed Early Pregnancy Loss - Outpatient Visit    Subjective:  Jose L Collazo is a 39 year old female coming today for management of likely early pregnancy loss. She has noticed the following:  - vaginal spotting/bleeding since   Patient diagnosed with early pregnancy loss at the following time/location: per ultrasound at [facility] on 24.     Jose L had intially been seen with me on  for new OB visit and was found to have only a small gestational sac.  There was some question about dating at that time between LMP, irregular period, timing of conception, etc.  Had only 5+2wk gestational sac and yolk sac at that time.  Jose L then had some light spotting on  which persisted into last week.  Had US and virtual visit with Dr. Felix at which time CRL was only 6+1 with FHT of 55bpm.  Should have been 8+wks based on LMP and 7+wks based on initial us with me.   Unfortunately today found to have no heart beat and CRL of 5+5wks.  Still occ light spotting.  No cramping or pain  Pregnancy symptoms a bit less       ROS: All other review of symptoms was otherwise negative except as noted in HPI    Dating:  LMP: Patient's last menstrual period was 10/26/2023.  Ultrasound results:  Vaginal  : Gestation sac and yolk sac c/w 5+2wks  : CRL 5+5wks, 55bpm   : Intrauterine gestational sac seen: yes  Gestational sac summary: fetal pole seen, EGA: 5 weeks + 5 days  Fetal/Embryonic cardiac activity: absent  Crown-rump length: consistent with 5 weeks and 5 days  Adnexa: no masses seen        Serum beta hCG: not done  Hemoglobin: 12.9g/dL  RH Status (if >8wks GA): A+    OB Hx:  OB History    Para Term  AB Living   3 1 1 0 1 1   SAB IAB Ectopic Multiple Live Births   1 0 0 0 1      # Outcome Date GA Lbr [place]/2nd Weight Sex Delivery Anes PTL Lv   3 Current            2 Term 22 39w6d / 00:50 3.22 kg (7 lb 1.6 oz) F Vag-Spont EPI N SERGIO      Name:  Katie      Apgar1: 8  Apgar5: 9   1 [facility] 02/2021               Objective:  /74   Wt 98.9 kg (218 lb)   LMP 10/26/2023   BMI 33.15 kg/m    Vitals were reviewed and were normal    Gen: well-appearing, cooperative, well-groomed; approriately tearful  Abd: soft, nontender  : deferred    Assessment and Plan:  First-trimester pregnancy loss -based upon serial ultrasound with lagging CRL measurements and loss of cardiac activity over 1 week course    We reviewed the patient's options at length including the risks and benefits or expectant management, medical management using misoprostol alone, mifepristone and misoprostol, and aspiration procedure with local anesthesia and suction evacuation. Patient IS vitally stable and IS appropriate to manage as an outpatient.    The patient has elected and is appropriate for medical management with mifepristone and misoprostol.     Prior to the administration/prescribing of mifepristone, the patient received the medication guide and signed the patient agreement.      Mifepristone administered in clinic. Oral medication given directly to the patient and taken in my presence.  Patient plans to take misoprostol by vaginal route later today.    Medication Management  - confirmed patient is within gestational age limit (11w6d or less)  - obtain hemoglobin and type and screen (if patent is >8wk GA and if not already known, or if not performed within the window of onset of bleeding or diagnosis of EPL)  - reviewed expected and serious side effects from mifepristone  - reviewed how to take misoprostol and expected side effects  - reviewed that mifepristone/misoprostol may not be effective and patient may require surgical management of miscarriage.   - confirmed and addressed any drug-drug interactions with patient's concurrent medicines, including vitamins and herbal supplements  - provided prescription of ibuprofen for cramping, loperamide for diarrhea and a medicine for  nausea  - RHOGAM not necessary  - call or return to clinic in 1-2 days if no bleeding or obvious passage of POC's has occurred  - chart routed to RN team who will follow up with patient via telephone in 1-2 days (or next business day) after clinic visit to assess patient     Patient Instructions   MISCARRIAGE MANAGEMENT USING MEDICATIONS     HOW DOES IT WORK?   Pills called mifepristone and misoprostol can help an early pregnancy loss to end faster.     HOW WELL DOES THE MEDICATION WORK?   These pills work 84% of the time to complete the miscarriage within two days and 89% of the time within a week. If you can t get mifepristone, misoprostol works 67% of the time within two days and 84% of the time within a week when used alone.     IS IT SAFE?   Yes, pills for pregnancy loss are safe. Using pills does not lower your chance of getting pregnant again.     WHAT DO I DO?   You took one tablet of 200 mg mifepristone today during your visit.  About 24 hours after taking the mifepristone, use the misoprostol at home. Choose a time when you have had a good meal and plenty of rest.   Swallow ibuprofen (600 mg total) one hour before using the misoprostol. This will help with side effects.   Vaginal Use of Misoprostol  Wash your hands and lie down. Push the four misoprostol tablets one at a time up into the vagina as far as you can using your finger. It doesn t matter where in the vagina you put the pills. Each misoprostol pill contains 200 micrograms.   Keep lying down for 30 minutes.   After 30 minutes, you can move around as usual. If the tablets come out after 30 minutes, it is OK. Your body has absorbed the medication.        Buccal Use of Misoprostol  Place 2 tablets of misoprostol in each cheek (between your cheek and your gums)  Let these tablets dissolve, whatever has not dissolved within 30 minutes can be swallowed with a large glass of water  If you do not start to bleed within 24 hours give yourself a second dose  of misoprostol. Do this by repeating step 4 or 5 above.     WHAT HAPPENS NEXT?  Cramps and bleeding with clots are an expected part of this process. The cramps and bleeding may be stronger than your normal period. The cramps usually start 2 to 4 hours after you use the misoprostol pills and may last 3 to 5 hours. You may start to have vaginal bleeding before you use the pills I have given you. This heavy bleeding is not risky. It means the pills are working. The bleeding often lasts 1 to 2 weeks, and may stop and start a few times.   Nausea, diarrhea, or chills: These symptoms should get better a few hours after using the pills and should not last longer than 24 hours.   You need to have an appointment in the clinic in 1-2 weeks. This was made for you today. At this visit we will ensure that the treatment was effective. If it was not effective, we will talk about next steps.        WHAT CAN I TAKE FOR PAIN, NAUSEA, DIARRHEA?   Pain:   Take ibuprofen (Motrin or Advil) 800 mg every 8 hours as needed for pain. Take this with food to avoid an upset stomach.   You may receive a stronger, narcotic pain medicine. You can use this if you still have pain after taking ibuprofen. Follow the directions on the label.   Comfort: A hot pack may help with cramps. You can also drink some hot tea. Rest in a soothing place.   Nausea  Take ondansetron or promethazine as needed for nausea and vomiting as needed for nausea and vomiting.   Diarrhea  Take Loperamide as needed for diarrhea. Take 2 capsules after the first loose bowel movement. Can take 1 capsule after each additional loose stool. Do not take more than 8 capsules in a day.    WHAT IF IT TAKES TOO LONG OR DOESN T WORK?   If it doesn t work or you feel it is taking too long, please call the clinic and we can discuss the next steps.    WHEN SHOULD I CALL OR RETURN TO MY HEALTH CARE PROVIDER?   Your bleeding soaks through more than 2 maxi pads per hour for 2 hours in a row.  You  have a fever on an oral thermometer of 100.4 degrees Fahrenheit   You have severe stomach area (abdominal) pain   You continue to feel sick 24 hours after taking the misoprostol, this includes stomach pain or discomfort, weakness, nausea, vomiting, or diarrhea    The clinic phone is answered 24 hours per day. If it is after clinic hours, leave a message with the answering service and a physician will call you back. Please call if you have any questions, think something is going wrong, or think you have an emergency. No question is too small. If you do not receive a call back within an hour, go to the nearest emergency room.     HOW SOON CAN I GET PREGNANT AGAIN?   You can get pregnant soon after the pregnancy loss ends. If you want to try again, take a prenatal vitamin daily. If you are not ready to be pregnant at this time, please see your clinician for your birth control options.     FEELINGS AFTER EARLY PREGNANCY LOSS   Most women feel mixed emotions after a miscarriage. It often depends upon how much time you had to adjust to the news of the pregnancy loss. These up and down feelings are partly from the changes in hormones. Please understand that nothing you did caused the pregnancy loss. It is NOT caused by stress, sports, food, or sex. Feeling emotional at this time is normal. If you think your emotions are not what they should be, please talk to us.           MISCARRIAGE MANAGEMENT USING MEDICATIONS     HOW DOES IT WORK?   Pills called mifepristone and misoprostol can help an early pregnancy loss to end faster.     HOW WELL DOES THE MEDICATION WORK?   These pills work 84% of the time to complete the miscarriage within two days and 89% of the time within a week. If you can t get mifepristone, misoprostol works 67% of the time within two days and 84% of the time within a week when used alone.     IS IT SAFE?   Yes, pills for pregnancy loss are safe. Using pills does not lower your chance of getting pregnant  again.     WHAT DO I DO?   You took one tablet of 200 mg mifepristone today during your visit.  About 24 hours after taking the mifepristone, use the misoprostol at home. Choose a time when you have had a good meal and plenty of rest.   Swallow ibuprofen (600 mg total) one hour before using the misoprostol. This will help with side effects.   Vaginal Use of Misoprostol  Wash your hands and lie down. Push the four misoprostol tablets one at a time up into the vagina as far as you can using your finger. It doesn t matter where in the vagina you put the pills. Each misoprostol pill contains 200 micrograms.   Keep lying down for 30 minutes.   After 30 minutes, you can move around as usual. If the tablets come out after 30 minutes, it is OK. Your body has absorbed the medication.        Buccal Use of Misoprostol  Place 2 tablets of misoprostol in each cheek (between your cheek and your gums)  Let these tablets dissolve, whatever has not dissolved within 30 minutes can be swallowed with a large glass of water  If you do not start to bleed within 24 hours give yourself a second dose of misoprostol. Do this by repeating step 4 or 5 above.     WHAT HAPPENS NEXT?  Cramps and bleeding with clots are an expected part of this process. The cramps and bleeding may be stronger than your normal period. The cramps usually start 2 to 4 hours after you use the misoprostol pills and may last 3 to 5 hours. You may start to have vaginal bleeding before you use the pills I have given you. This heavy bleeding is not risky. It means the pills are working. The bleeding often lasts 1 to 2 weeks, and may stop and start a few times.   Nausea, diarrhea, or chills: These symptoms should get better a few hours after using the pills and should not last longer than 24 hours.   You need to have an appointment in the clinic in 1-2 weeks. This was made for you today. At this visit we will ensure that the treatment was effective. If it was not effective,  we will talk about next steps.        WHAT CAN I TAKE FOR PAIN, NAUSEA, DIARRHEA?   Pain:   Take ibuprofen (Motrin or Advil) 800 mg every 8 hours as needed for pain. Take this with food to avoid an upset stomach.   You may receive a stronger, narcotic pain medicine. You can use this if you still have pain after taking ibuprofen. Follow the directions on the label.   Comfort: A hot pack may help with cramps. You can also drink some hot tea. Rest in a soothing place.   Nausea  Take ondansetron or promethazine as needed for nausea and vomiting as needed for nausea and vomiting.   Diarrhea  Take Loperamide as needed for diarrhea. Take 2 capsules after the first loose bowel movement. Can take 1 capsule after each additional loose stool. Do not take more than 8 capsules in a day.    WHAT IF IT TAKES TOO LONG OR DOESN T WORK?   If it doesn t work or you feel it is taking too long, please call the clinic and we can discuss the next steps.    WHEN SHOULD I CALL OR RETURN TO MY HEALTH CARE PROVIDER?   Your bleeding soaks through more than 2 maxi pads per hour for 2 hours in a row.  You have a fever on an oral thermometer of 100.4 degrees Fahrenheit   You have severe stomach area (abdominal) pain   You continue to feel sick 24 hours after taking the misoprostol, this includes stomach pain or discomfort, weakness, nausea, vomiting, or diarrhea    The clinic phone is answered 24 hours per day. If it is after clinic hours, leave a message with the answering service and a physician will call you back. Please call if you have any questions, think something is going wrong, or think you have an emergency. No question is too small. If you do not receive a call back within an hour, go to the nearest emergency room.     HOW SOON CAN I GET PREGNANT AGAIN?   You can get pregnant soon after the pregnancy loss ends. If you want to try again, take a prenatal vitamin daily. If you are not ready to be pregnant at this time, please see your  clinician for your birth control options.     FEELINGS AFTER EARLY PREGNANCY LOSS   Most women feel mixed emotions after a miscarriage. It often depends upon how much time you had to adjust to the news of the pregnancy loss. These up and down feelings are partly from the changes in hormones. Please understand that nothing you did caused the pregnancy loss. It is NOT caused by stress, sports, food, or sex. Feeling emotional at this time is normal. If you think your emotions are not what they should be, please talk to us.                 Deanne Mattson MD

## 2024-01-09 NOTE — TELEPHONE ENCOUNTER
"8+wks per LMP and 5+wk on US today  Missed AB    Mifepristone 1/9/2024 at 1046  Misoprostol to be taken at home later on in day 1/9    \"- call or return to clinic in 1-2 days if no bleeding or obvious passage of POC's has occurred  - chart routed to RN team who will follow up with patient via telephone in 1-2 days (or next business day) after clinic visit to assess patient \"    RN to Call pt 1/10/2024 afternoon.    Jamila Ortiz RN on 1/9/2024 at 12:37 PM       "

## 2024-01-11 NOTE — TELEPHONE ENCOUNTER
8+wks per LMP and 5+wk on US   Missed AB    Mifepristone 1/9/2024 at 1046 in clinic by Dr Mattson  Misoprostol taken at home later on in day 1/9 at 1330    Reporting heavy bleeding and pretty severe cramping started at 2100 1/9 - same day. Heavy bleeding and cramping most of the day yesterday 1/10 and today now more like mild cramps and similar to a heavy period. No dizziness or lightheadedness. Tolerating well w/o n/v or diarrhea.  Staying hydrated.   She was surprised on the amt of bleeding but reassured sounds like she passed POCs.    Emotionally feels she has good support from family and doing as best as expected. Support over the phone as well.    Has f/up US and visit w Dr Mattson 1/26  Reviewed sx of heavy bleeding and/or severe pain to be evaluated in ER.  Reviewed sx of infections: abdominal tenderness, fever, aches/chills and not feeling well to call or be seen in ER    Pt verbalized understanding, in agreement with plan, and voiced no further questions.    Routing update to Dr Mattson as MALCOLM Ortiz RN on 1/11/2024 at 2:49 PM

## 2024-01-24 NOTE — PROGRESS NOTES
SUBJECTIVE:                                                   Jose L Collazo is a 39 year old female who presents to clinic today for the following health issue(s):  Patient presents with:  Ultrasound: Follow up to miscarriage, heavy bleeding x 1 week and trickled into spotting, no spotting x 2 days      HPI:  Here today for follow up pelvic ultrasound following treatment of missed  with oral mifepristone and vaginal misoprostol.  About 4-6hrs after placing vaginal miso, Jose L experienced very heavy bleeding.  +clots and some passage of tissue.  Bleeding heavier than period actually lasted for ~5-7 days.  Used on average 8 pads per day.  Some cramping.  Never was saturating more than 2 pads per hour.  Had a few days of spotting and now has not had any bleeding x 2 days.    Had lots of mood swings and what she considered to be hormonal changes last week --feeling better so far this week.    Ultrasound still shows thickened, irregular endometrial lining with small amount of blood flow.  Ovaries normal.    Patient's last menstrual period was 10/26/2023..     Patient is sexually active, .  Using none for contraception.    reports that she has never smoked. She has never used smokeless tobacco.    STD testing offered?  Declined    Health maintenance updated:  yes    Today's PHQ-2 Score:       2024    12:00 PM   PHQ-2 (  [employer])   Q1: Little interest or pleasure in doing things 0   Q2: Feeling down, depressed or hopeless 0   PHQ-2 Score 0     Today's PHQ-9 Score:       2024    12:00 PM   PHQ-9 SCORE   PHQ-9 Total Score 2     Today's ANA-7 Score:       2024    12:00 PM   ANA-7 SCORE   Total Score 0       Problem list and histories reviewed & adjusted, as indicated.  Additional history: as documented.    Patient Active Problem List   Diagnosis    Cervical high risk HPV (human papillomavirus) test positive    Endocervical adenocarcinoma (H)    Varicella    Renal insufficiency  syndrome    Pyelonephritis    Psoriasis    UZIEL (obstructive sleep apnea)    Nausea with vomiting    Allergic rhinitis    Abdominal pain     Past Surgical History:   Procedure Laterality Date    CONIZATION N/A 1/8/2019    Procedure: CERVICAL CONIZATION;  Surgeon: Matt Ray MD;  Location:  OR    CONIZATION N/A 5/23/2019    Procedure: CERVICAL CONIZATION;  Surgeon: Matt Ray MD;  Location: [facility] OR    DILATION AND CURETTAGE N/A 1/8/2019    Procedure: ENDOCERVICAL CURETTAGE;  Surgeon: Matt Ray MD;  Location: [facility] OR    DILATION AND CURETTAGE N/A 5/23/2019    Procedure: ENDOCERVICAL CURETTAGE;  Surgeon: Matt Ray MD;  Location: [facility] OR    HC TOOTH EXTRACTION W/FORCEP  2003    MAMMOPLASTY REDUCTION  05/25/2017      Social History     Tobacco Use    Smoking status: Never    Smokeless tobacco: Never   Substance Use Topics    Alcohol use: No     Comment: has not had a drink in about 10 yrs      Problem (# of Occurrences) Relation (Name,Age of Onset)    Cancer (1) Maternal Grandfather: multiple melanoma ? r/t exposure to Agent Orange    Lung Cancer (2) Maternal Grandfather, Paternal Grandmother    No Known Problems (5) Mother, Father, Maternal Grandmother, Paternal Grandfather, Other           Negative family history of: Asthma, C.A.D., Diabetes, Hypertension              Current Outpatient Medications   Medication Sig    clobetasol (TEMOVATE) 0.05 % CREA cream     Prenatal Vit-Fe Fumarate-FA (PRENATAL MULTIVITAMIN  PLUS IRON) 27-1 MG TABS Take by mouth daily    famotidine (PEPCID) 10 MG tablet Take 10 mg by mouth 2 times daily (Patient not taking: Reported on 1/26/2024)    fluocinonide (LIDEX) 0.05 % external solution  (Patient not taking: Reported on 1/26/2024)    ibuprofen (ADVIL/MOTRIN) 800 MG tablet Take 1 tablet (800 mg) by mouth every 8 hours as needed for other (Cramping) Take with food (Patient not taking: Reported on 1/26/2024)    ondansetron (ZOFRAN) 4 MG tablet Take 1 tablet (4 mg) by  mouth every 6 hours as needed for nausea (Patient not taking: Reported on 1/26/2024)     No current facility-administered medications for this visit.     Allergies   Allergen Reactions    Diphenhydramine Visual Disturbance     PN: LW Reaction: makes me crazy  Hallucinations, decreased motor control    Seasonal Allergies        ROS:  12 point review of systems negative other than symptoms noted below or in the HPI.  No urinary frequency or dysuria, bladder or kidney problems      OBJECTIVE:     /60   Wt 98.9 kg (218 lb)   LMP 10/26/2023   Breastfeeding Unknown   BMI 33.15 kg/m    Body mass index is 33.15 kg/m .    Exam:  Constitutional:  Appearance: Well nourished, well developed alert, in no acute distress  Neurologic:  Mental Status:  Oriented X3.  Normal strength and tone, sensory exam grossly normal, mentation intact and speech normal.    Psychiatric:  Mentation appears normal and affect normal/bright.     In-Clinic Test Results:  Results for orders placed or performed in visit on 01/26/24 (from the past 24 hour(s))   US Transvaginal Pelvic Non-OB    Narrative    Gynecological Ultrasound Report  Pelvic U/S - Transvaginal  [facility]  Referring Provider: Deanne Mattson MD   Sonographer:  Brittnee Spurling, RDMS  Indication: Follow up miscarriage  LMP: Patient's last menstrual period was 10/26/2023.  History: Mife and Miso  Gynecological Ultrasonography:   Uterus: anteverted. Contour is smooth/regular.  Size: 6.3 x 5.0 x 3.7 cm  Endometrium: Thickness Total 10.4 mm  Findings: complex endometrium with blood flow- possible RPOC  Right Ovary: 3.2 x 2.8 x 3.1 cm. Wnl  Left Ovary: 2.8 x 2.1 x 2.4 cm. Wnl  Cul de Sac Free Fluid: No free fluid  Technique: Transvaginal Imaging performed     Impression:      Uterus anteverted with normal contour.  Thickened irregular endometrial lining with blood flow present.  Cannot   rule out retained products of conception.  Bilateral ovaries normal.  No  free pelvic fluid.    Deanne Mattson MD       ASSESSMENT/PLAN:                                                        ICD-10-CM    1. Missed   O02.1 HCG quantitative pregnancy     HCG quantitative pregnancy          Patient Instructions   Ultrasound images reviewed and discussed my concern for retained products of conception.  Will check hcg level today and follow up based on those results.  Bleeding precautions discussed.  If quant significantly elevated or not dropping appropriately OR if heavy bleeding were to occur, would recommend proceeding likely with D&C.  If bleeding not an issue, may consider repeating vaginal misoprostol if patient interested.  I will contact Jose L with lab results either today or tomorrow.      Deanne Mattson MD  [facility]

## 2024-01-25 PROBLEM — Z30.41 USES ORAL CONTRACEPTION: Status: RESOLVED | Noted: 2023-09-27 | Resolved: 2024-01-25

## 2024-01-25 PROBLEM — O09.529 SUPERVISION OF HIGH-RISK PREGNANCY OF ELDERLY MULTIGRAVIDA: Status: RESOLVED | Noted: 2023-12-19 | Resolved: 2024-01-25

## 2024-01-26 ENCOUNTER — OFFICE VISIT (OUTPATIENT)
Dept: OBGYN | Facility: CLINIC | Age: 39
End: 2024-01-26
Attending: OBSTETRICS & GYNECOLOGY
Payer: COMMERCIAL

## 2024-01-26 ENCOUNTER — ANCILLARY PROCEDURE (OUTPATIENT)
Dept: ULTRASOUND IMAGING | Facility: CLINIC | Age: 39
End: 2024-01-26
Attending: OBSTETRICS & GYNECOLOGY
Payer: COMMERCIAL

## 2024-01-26 VITALS — WEIGHT: 218 LBS | DIASTOLIC BLOOD PRESSURE: 60 MMHG | BODY MASS INDEX: 33.15 KG/M2 | SYSTOLIC BLOOD PRESSURE: 120 MMHG

## 2024-01-26 DIAGNOSIS — O02.1 MISSED ABORTION: Primary | ICD-10-CM

## 2024-01-26 DIAGNOSIS — O09.91 SUPERVISION OF HIGH RISK PREGNANCY IN FIRST TRIMESTER: ICD-10-CM

## 2024-01-26 PROBLEM — G47.33 OSA (OBSTRUCTIVE SLEEP APNEA): Status: ACTIVE | Noted: 2023-03-31

## 2024-01-26 PROCEDURE — 84702 CHORIONIC GONADOTROPIN TEST: CPT | Performed by: OBSTETRICS & GYNECOLOGY

## 2024-01-26 PROCEDURE — 76830 TRANSVAGINAL US NON-OB: CPT | Performed by: OBSTETRICS & GYNECOLOGY

## 2024-01-26 PROCEDURE — 36415 COLL VENOUS BLD VENIPUNCTURE: CPT | Performed by: OBSTETRICS & GYNECOLOGY

## 2024-01-26 PROCEDURE — 99213 OFFICE O/P EST LOW 20 MIN: CPT | Mod: 25 | Performed by: OBSTETRICS & GYNECOLOGY

## 2024-01-26 ASSESSMENT — ANXIETY QUESTIONNAIRES
2. NOT BEING ABLE TO STOP OR CONTROL WORRYING: NOT AT ALL
1. FEELING NERVOUS, ANXIOUS, OR ON EDGE: NOT AT ALL
GAD7 TOTAL SCORE: 0
3. WORRYING TOO MUCH ABOUT DIFFERENT THINGS: NOT AT ALL
GAD7 TOTAL SCORE: 0
IF YOU CHECKED OFF ANY PROBLEMS ON THIS QUESTIONNAIRE, HOW DIFFICULT HAVE THESE PROBLEMS MADE IT FOR YOU TO DO YOUR WORK, TAKE CARE OF THINGS AT HOME, OR GET ALONG WITH OTHER PEOPLE: NOT DIFFICULT AT ALL
5. BEING SO RESTLESS THAT IT IS HARD TO SIT STILL: NOT AT ALL
6. BECOMING EASILY ANNOYED OR IRRITABLE: NOT AT ALL
7. FEELING AFRAID AS IF SOMETHING AWFUL MIGHT HAPPEN: NOT AT ALL

## 2024-01-26 ASSESSMENT — PATIENT HEALTH QUESTIONNAIRE - PHQ9
SUM OF ALL RESPONSES TO PHQ QUESTIONS 1-9: 2
5. POOR APPETITE OR OVEREATING: NOT AT ALL

## 2024-01-26 NOTE — PATIENT INSTRUCTIONS
Ultrasound images reviewed and discussed my concern for retained products of conception.  Will check hcg level today and follow up based on those results.  Bleeding precautions discussed.  If quant significantly elevated or not dropping appropriately OR if heavy bleeding were to occur, would recommend proceeding likely with D&C.  If bleeding not an issue, may consider repeating vaginal misoprostol if patient interested.  I will contact Jose L with lab results either today or tomorrow.

## 2024-01-27 LAB — HCG INTACT+B SERPL-ACNC: 10 MIU/ML

## 2024-01-28 NOTE — RESULT ENCOUNTER NOTE
Please inform of results;  hcg fairly low but because of her ultrasound appearance, I'd like to repeat her hcg level again next week.  Please order quantitative hcg and have her come in Friday AM

## 2024-01-29 DIAGNOSIS — O02.1 MISSED ABORTION: Primary | ICD-10-CM

## 2024-02-02 ENCOUNTER — LAB (OUTPATIENT)
Dept: LAB | Facility: CLINIC | Age: 39
End: 2024-02-02
Payer: COMMERCIAL

## 2024-02-02 DIAGNOSIS — O02.1 MISSED ABORTION: ICD-10-CM

## 2024-02-02 LAB — HCG INTACT+B SERPL-ACNC: 2 MIU/ML

## 2024-02-02 PROCEDURE — 36415 COLL VENOUS BLD VENIPUNCTURE: CPT

## 2024-02-02 PROCEDURE — 84702 CHORIONIC GONADOTROPIN TEST: CPT

## 2024-02-02 NOTE — RESULT ENCOUNTER NOTE
Please inform of results;  quant is now <5 which is consistent with negative and that she has completed her miscarriage.  Please make sure she hasn't had any additional bleeding or concerns since our follow up last week.  If not, no follow up needed at this time

## 2024-09-27 ENCOUNTER — PATIENT OUTREACH (OUTPATIENT)
Dept: OBGYN | Facility: CLINIC | Age: 39
End: 2024-09-27
Payer: COMMERCIAL

## 2024-10-08 ENCOUNTER — IMMUNIZATION (OUTPATIENT)
Dept: INTERNAL MEDICINE | Facility: CLINIC | Age: 39
End: 2024-10-08
Payer: COMMERCIAL

## 2024-10-08 PROCEDURE — 90471 IMMUNIZATION ADMIN: CPT

## 2024-10-08 PROCEDURE — 90656 IIV3 VACC NO PRSV 0.5 ML IM: CPT

## 2024-12-10 NOTE — PROGRESS NOTES
Jose L is a 39 year old  female who presents for annual exam.     Besides routine health maintenance, she has no other health concerns today .    HPI:  Here today for yearly exam --doing well.  Resumed normal monthly periods shortly after loss in January.  Monthly periods x 3-5d.  Denies heavy or painful bleeding.  No intermenstrual bleeding/spotting.  +SA --no issues.  Currently preventing pregnancy until she establishes full time position for maternity benefits.  No bowel/bladder issues    ; currently doing contractual work in finance/HR --looking for permanent position and hoping to connect with [facility] [employer]; Katie is 1yo!!  -staying active with keeping up with Katie  +SBE --no issues/concerns  No PCP --no other active medical issues  -hx AIS/cervical cancer; had 2 cone biopsies in 2019 with normal co-testing since; will repeat co-test today  --declined vaccines today      GYNECOLOGIC HISTORY:    Patient's last menstrual period was 11/15/2024 (exact date).    Regular menses? yes  Menses every 30 days.  Length of menses: 4 days    Her current contraception method is: none.  She  reports that she has never smoked. She has never used smokeless tobacco.    Patient is sexually active.  STD testing offered?  Declined  Last PHQ-9 score on record =       2024     9:11 AM   PHQ-9 SCORE   PHQ-9 Total Score 5     Last GAD7 score on record =       2024     9:11 AM   ANA-7 SCORE   Total Score 3     Alcohol Score = 0    HEALTH MAINTENANCE:  Cholesterol:   Cholesterol   Date Value Ref Range Status   2018 178 <200 mg/dL Final   2017 165 <200 mg/dL Final   Last Mammo: Not applicable, Result: Not applicable, Next Mammo: Due at age 41   Pap:   Lab Results   Component Value Date    GYNINTERP  10/16/2023     Negative for Intraepithelial Lesion or Malignancy (NILM)    GYNINTERP  2022     Negative for Intraepithelial Lesion or Malignancy (NILM)    PAP NIL 2020    PAP NIL 2020     PAP NIL 10/16/2019     Colonoscopy:  NA, Result: Not applicable, Next Colonoscopy: Age 46 years.  Dexa:  NA    Health maintenance updated:  yes    HISTORY:  OB History    Para Term  AB Living   3 1 1 0 2 1   SAB IAB Ectopic Multiple Live Births   2 0 0 0 1      # Outcome Date GA Lbr [place]/2nd Weight Sex Type Anes PTL Lv   3 2024     AB, MISSED      2 Term 22 39w6d / 00:50 3.22 kg (7 lb 1.6 oz) F Vag-Spont EPI N SERGIO      Name: Katie      Apgar1: 8  Apgar5: 9   1 2021               Patient Active Problem List   Diagnosis    Cervical high risk HPV (human papillomavirus) test positive    Endocervical adenocarcinoma (H)    Varicella    Renal insufficiency syndrome    Pyelonephritis    Psoriasis    UZIEL (obstructive sleep apnea)    Nausea with vomiting    Allergic rhinitis    Abdominal pain    History of cervical dysplasia     Past Surgical History:   Procedure Laterality Date    CONIZATION N/A 2019    Procedure: CERVICAL CONIZATION;  Surgeon: Matt Rya MD;  Location:  OR    CONIZATION N/A 2019    Procedure: CERVICAL CONIZATION;  Surgeon: Matt Ray MD;  Location:  OR    DILATION AND CURETTAGE N/A 2019    Procedure: ENDOCERVICAL CURETTAGE;  Surgeon: Matt Ray MD;  Location:  OR    DILATION AND CURETTAGE N/A 2019    Procedure: ENDOCERVICAL CURETTAGE;  Surgeon: Matt Ray MD;  Location:  OR     TOOTH EXTRACTION W/FORCEP      MAMMOPLASTY REDUCTION  2017      Social History     Tobacco Use    Smoking status: Never    Smokeless tobacco: Never   Substance Use Topics    Alcohol use: No     Comment: has not had a drink in about 10 yrs      Problem (# of Occurrences) Relation (Name,Age of Onset)    Cancer (1) Maternal Grandfather: multiple melanoma ? r/t exposure to Agent Orange    Lung Cancer (2) Maternal Grandfather, Paternal Grandmother    No Known Problems (5) Mother, Father, Maternal Grandmother, Paternal Grandfather, Other  "          Negative family history of: Asthma, C.A.D., Diabetes, Hypertension              Current Outpatient Medications   Medication Sig Dispense Refill    Prenatal Vit-Fe Fumarate-FA (PRENATAL MULTIVITAMIN  PLUS IRON) 27-1 MG TABS Take by mouth daily       No current facility-administered medications for this visit.     Allergies   Allergen Reactions    Diphenhydramine Visual Disturbance     PN: LW Reaction: makes me crazy  Hallucinations, decreased motor control    Seasonal Allergies        Past medical, surgical, social and family histories were reviewed and updated in EPIC.    ROS:   12 point review of systems negative other than symptoms noted below or in the HPI.  No urinary frequency or dysuria, bladder or kidney problems, Normal menstrual cycles    EXAM:  /68   Ht 1.727 m (5' 8\")   Wt 90.7 kg (200 lb)   LMP 11/15/2024 (Exact Date)   BMI 30.41 kg/m     BMI: Body mass index is 30.41 kg/m .    PHYSICAL EXAM:  Constitutional:   Appearance: Well nourished, well developed, alert, in no acute distress  Neck:  Lymph Nodes:  No lymphadenopathy present    Thyroid:  Gland size normal, nontender, no nodules or masses present  on palpation  Chest:  Respiratory Effort:  Breathing unlabored  Cardiovascular:    Heart: Auscultation:  Regular rate, normal rhythm, no murmurs present  Breasts: Palpation of Breasts and Axillae:  No masses present on palpation, no breast tenderness., Axillary Lymph Nodes:  No lymphadenopathy present., and No nodularity, asymmetry or nipple discharge bilaterally.  Gastrointestinal:   Abdominal Examination:  Abdomen nontender to palpation, tone normal without rigidity or guarding, no masses present, umbilicus without lesions   Liver and Spleen:  No hepatomegaly present, liver nontender to palpation    Hernias:  No hernias present  Lymphatic: Lymph Nodes:  No other lymphadenopathy present  Skin:  General Inspection:  No rashes present, no lesions present, no areas of "  discoloration  Neurologic:    Mental Status:  Oriented X3.  Normal strength and tone, sensory exam                grossly normal, mentation intact and speech normal.    Psychiatric:   Mentation appears normal and affect normal/bright.         Pelvic Exam:  External Genitalia:     Normal appearance for age, no discharge present, no tenderness present, no inflammatory lesions present, color normal  Vagina:     Normal vaginal vault without central or paravaginal defects, no discharge present, no inflammatory lesions present, no masses present  Bladder:     Nontender to palpation  Urethra:   Urethral Body:  Urethra palpation normal, urethra structural support normal   Urethral Meatus:  No erythema or lesions present  Cervix:     Appearance healthy, no lesions present, nontender to palpation, no bleeding present  Uterus:     Uterus: firm, normal sized and nontender, midplane in position.   Adnexa:     No adnexal tenderness present, no adnexal masses present  Perineum:     Perineum within normal limits, no evidence of trauma, no rashes or skin lesions present  Anus:     Anus within normal limits, no hemorrhoids present  Inguinal Lymph Nodes:     No lymphadenopathy present  Pubic Hair:     Normal pubic hair distribution for age  Genitalia and Groin:     No rashes present, no lesions present, no areas of discoloration, no masses present    COUNSELING:   Reviewed preventive health counseling, as reflected in patient instructions  Special attention given to:        Regular exercise       Healthy diet/nutrition       Family planning       Folic Acid    BMI: Body mass index is 30.41 kg/m .  Weight management plan: Discussed healthy diet and exercise guidelines    ASSESSMENT:  39 year old female with satisfactory annual exam.    ICD-10-CM    1. Encounter for gynecological examination without abnormal finding  Z01.419 HPV and Gynecologic Cytology Panel - Recommended Age 30 - 65 Years      2. History of cervical dysplasia   Z87.410           PLAN:  Patient Instructions   Follow up with your primary care provider for your other medical problems.  Continue self breast exam.  Increase physical activity and exercise.  Lab and pap smear results will be called to the patient.  Co-testing done today due to history of dysplasia --will be able to space out co-testing to every 3yrs if normal testing today.  Usual safety and preventative measures counseling done.  BMI >25  Weight loss encouraged.       Deanne Mattson MD

## 2024-12-11 ENCOUNTER — OFFICE VISIT (OUTPATIENT)
Dept: OBGYN | Facility: CLINIC | Age: 39
End: 2024-12-11
Payer: COMMERCIAL

## 2024-12-11 VITALS
DIASTOLIC BLOOD PRESSURE: 68 MMHG | SYSTOLIC BLOOD PRESSURE: 106 MMHG | WEIGHT: 200 LBS | HEIGHT: 68 IN | BODY MASS INDEX: 30.31 KG/M2

## 2024-12-11 DIAGNOSIS — Z01.419 ENCOUNTER FOR GYNECOLOGICAL EXAMINATION WITHOUT ABNORMAL FINDING: Primary | ICD-10-CM

## 2024-12-11 DIAGNOSIS — Z87.410 HISTORY OF CERVICAL DYSPLASIA: ICD-10-CM

## 2024-12-11 LAB
HPV HR 12 DNA CVX QL NAA+PROBE: NEGATIVE
HPV16 DNA CVX QL NAA+PROBE: NEGATIVE
HPV18 DNA CVX QL NAA+PROBE: NEGATIVE
HUMAN PAPILLOMA VIRUS FINAL DIAGNOSIS: NORMAL

## 2024-12-11 PROCEDURE — 99395 PREV VISIT EST AGE 18-39: CPT | Performed by: OBSTETRICS & GYNECOLOGY

## 2024-12-11 PROCEDURE — 87624 HPV HI-RISK TYP POOLED RSLT: CPT | Performed by: OBSTETRICS & GYNECOLOGY

## 2024-12-11 ASSESSMENT — ANXIETY QUESTIONNAIRES
GAD7 TOTAL SCORE: 3
1. FEELING NERVOUS, ANXIOUS, OR ON EDGE: SEVERAL DAYS
GAD7 TOTAL SCORE: 3
5. BEING SO RESTLESS THAT IT IS HARD TO SIT STILL: NOT AT ALL
6. BECOMING EASILY ANNOYED OR IRRITABLE: SEVERAL DAYS
2. NOT BEING ABLE TO STOP OR CONTROL WORRYING: NOT AT ALL
3. WORRYING TOO MUCH ABOUT DIFFERENT THINGS: SEVERAL DAYS
IF YOU CHECKED OFF ANY PROBLEMS ON THIS QUESTIONNAIRE, HOW DIFFICULT HAVE THESE PROBLEMS MADE IT FOR YOU TO DO YOUR WORK, TAKE CARE OF THINGS AT HOME, OR GET ALONG WITH OTHER PEOPLE: SOMEWHAT DIFFICULT
7. FEELING AFRAID AS IF SOMETHING AWFUL MIGHT HAPPEN: NOT AT ALL

## 2024-12-11 ASSESSMENT — PATIENT HEALTH QUESTIONNAIRE - PHQ9
5. POOR APPETITE OR OVEREATING: NOT AT ALL
SUM OF ALL RESPONSES TO PHQ QUESTIONS 1-9: 5

## 2024-12-11 NOTE — PATIENT INSTRUCTIONS
Follow up with your primary care provider for your other medical problems.  Continue self breast exam.  Increase physical activity and exercise.  Lab and pap smear results will be called to the patient.  Co-testing done today due to history of dysplasia --will be able to space out co-testing to every 3yrs if normal testing today.  Usual safety and preventative measures counseling done.  BMI >25  Weight loss encouraged.

## 2024-12-16 LAB
BKR AP ASSOCIATED HPV REPORT: NORMAL
BKR LAB AP GYN ADEQUACY: NORMAL
BKR LAB AP GYN INTERPRETATION: NORMAL
BKR LAB AP LMP: NORMAL
BKR LAB AP PREVIOUS ABNORMAL: NORMAL
PATH REPORT.COMMENTS IMP SPEC: NORMAL
PATH REPORT.COMMENTS IMP SPEC: NORMAL
PATH REPORT.RELEVANT HX SPEC: NORMAL

## 2024-12-28 NOTE — TELEPHONE ENCOUNTER
Problem: PAIN - ADULT  Goal: Verbalizes/displays adequate comfort level or baseline comfort level  Description: Interventions:  - Encourage patient to monitor pain and request assistance  - Assess pain using appropriate pain scale  - Administer analgesics based on type and severity of pain and evaluate response  - Implement non-pharmacological measures as appropriate and evaluate response  - Consider cultural and social influences on pain and pain management  - Notify physician/advanced practitioner if interventions unsuccessful or patient reports new pain  12/28/2024 1134 by Teresa Cameron LPN  Outcome: Adequate for Discharge  12/28/2024 0721 by Teresa Cameron LPN  Outcome: Progressing     Problem: INFECTION - ADULT  Goal: Absence or prevention of progression during hospitalization  Description: INTERVENTIONS:  - Assess and monitor for signs and symptoms of infection  - Monitor lab/diagnostic results  - Monitor all insertion sites, i.e. indwelling lines, tubes, and drains  - Monitor endotracheal if appropriate and nasal secretions for changes in amount and color  - Holly appropriate cooling/warming therapies per order  - Administer medications as ordered  - Instruct and encourage patient and family to use good hand hygiene technique  - Identify and instruct in appropriate isolation precautions for identified infection/condition  12/28/2024 1134 by Teresa Cameron LPN  Outcome: Adequate for Discharge  12/28/2024 0721 by Teresa Cameron LPN  Outcome: Progressing     Problem: SAFETY ADULT  Goal: Patient will remain free of falls  Description: INTERVENTIONS:  - Educate patient/family on patient safety including physical limitations  - Instruct patient to call for assistance with activity   - Consult OT/PT to assist with strengthening/mobility   - Keep Call bell within reach  - Keep bed low and locked with side rails adjusted as appropriate  - Keep care items and personal belongings within reach  - Initiate and  "LMP 10/18/21 - 4w4d  Hx of cervical cancer w conization and asking if she has anything to look for w this hx. Informed pt monitor for bleeding or any pain, not necessarily any higher risk in the 1st trimester.   This was all pt wanted to know regarding questions on pregnancy - she is \"healthy, eats healthy\" and denies any pregnancy questions.  Discussed how she wants Dr Ray's recommendations for OB care in her situation and he has recommended Dr Mattson or Dr Oliver w other pts.  She appreciated the recommendations and will schedule her visits - transferred to scheduling.    Pt verbalized understanding, in agreement with plan, and voiced no further questions.  Jamila Ortiz RN on 11/19/2021 at 3:08 PM        " maintain comfort rounds  - Make Fall Risk Sign visible to staff  - Offer Toileting every 2 Hours, in advance of need  - Initiate/Maintain bed/chair alarm  - Obtain necessary fall risk management equipment: nonskid footwear  - Apply yellow socks and bracelet for high fall risk patients  - Consider moving patient to room near nurses station  12/28/2024 1134 by Teresa Cameron LPN  Outcome: Adequate for Discharge  12/28/2024 0721 by Teresa Cameron LPN  Outcome: Progressing  Goal: Maintain or return to baseline ADL function  Description: INTERVENTIONS:  -  Assess patient's ability to carry out ADLs; assess patient's baseline for ADL function and identify physical deficits which impact ability to perform ADLs (bathing, care of mouth/teeth, toileting, grooming, dressing, etc.)  - Assess/evaluate cause of self-care deficits   - Assess range of motion  - Assess patient's mobility; develop plan if impaired  - Assess patient's need for assistive devices and provide as appropriate  - Encourage maximum independence but intervene and supervise when necessary  - Involve family in performance of ADLs  - Assess for home care needs following discharge   - Consider OT consult to assist with ADL evaluation and planning for discharge  - Provide patient education as appropriate  12/28/2024 1134 by Teresa Cameron LPN  Outcome: Adequate for Discharge  12/28/2024 0721 by Teresa Cameron LPN  Outcome: Progressing

## (undated) DEVICE — ESU NDL COLORADO MICRO 3CM STR N103A

## (undated) DEVICE — SU CHROMIC 2-0 CT 27" 801H

## (undated) DEVICE — BLADE KNIFE SURG 11 371111

## (undated) DEVICE — BLADE KNIFE BEAVER CERVICAL  379100

## (undated) DEVICE — TUBING SUCTION 12"X1/4" N612

## (undated) DEVICE — NDL 19GA 1.5"

## (undated) DEVICE — PACK TVT HYSTEROSCOPY SMA15HYFSE

## (undated) DEVICE — ESU ELEC NDL 1" E1552

## (undated) DEVICE — SOL WATER IRRIG 1000ML BOTTLE 2F7114

## (undated) DEVICE — GLOVE PROTEXIS MICRO 7.5  2D73PM75

## (undated) DEVICE — GLOVE PROTEXIS W/NEU-THERA 8.0  2D73TE80

## (undated) DEVICE — NDL COUNTER 10CT

## (undated) DEVICE — SYR 10ML FINGER CONTROL W/O NDL 309695

## (undated) DEVICE — SURGICEL FIBRILLAR HEMOSTAT 1"X2" 1961

## (undated) DEVICE — ESU ELEC NDL 6" COATED/INSULATED E1551-6

## (undated) DEVICE — SYR 03ML LL W/O NDL 309657

## (undated) DEVICE — SUCTION TIP YANKAUER W/O VENT K86

## (undated) DEVICE — SOL NACL 0.9% IRRIG 1000ML BOTTLE 07138-09

## (undated) DEVICE — ESU PENCIL W/SMOKE EVAC E2515HS

## (undated) DEVICE — SU CHROMIC 3-0 SH 27" G122H

## (undated) DEVICE — NDL SPINAL 22GA 3.5" QUINCKE 405181

## (undated) DEVICE — LINEN TOWEL PACK X5 5464

## (undated) DEVICE — SU ETHILON 3-0 PS-1 18" 1663G

## (undated) DEVICE — SU ETHILON 3-0 FS-1 18" 669H

## (undated) DEVICE — SWAB PROCTO 16" NON-STERILE

## (undated) DEVICE — SURGICEL ABSORBABLE HEMOSTAT SNOW 2"X4" 2082

## (undated) DEVICE — DECANTER VIAL 2006S

## (undated) DEVICE — SOL NACL 0.9% INJ 250ML BAG 2B1322Q

## (undated) DEVICE — SOL NACL 0.9% IRRIG 1000ML BOTTLE 2F7124

## (undated) RX ORDER — FENTANYL CITRATE 50 UG/ML
INJECTION, SOLUTION INTRAMUSCULAR; INTRAVENOUS
Status: DISPENSED
Start: 2019-05-23

## (undated) RX ORDER — ONDANSETRON 2 MG/ML
INJECTION INTRAMUSCULAR; INTRAVENOUS
Status: DISPENSED
Start: 2019-01-08

## (undated) RX ORDER — LIDOCAINE HYDROCHLORIDE 20 MG/ML
INJECTION, SOLUTION EPIDURAL; INFILTRATION; INTRACAUDAL; PERINEURAL
Status: DISPENSED
Start: 2019-05-23

## (undated) RX ORDER — PROPOFOL 10 MG/ML
INJECTION, EMULSION INTRAVENOUS
Status: DISPENSED
Start: 2019-01-08

## (undated) RX ORDER — ONDANSETRON 2 MG/ML
INJECTION INTRAMUSCULAR; INTRAVENOUS
Status: DISPENSED
Start: 2019-05-23

## (undated) RX ORDER — LIDOCAINE HYDROCHLORIDE 20 MG/ML
INJECTION, SOLUTION EPIDURAL; INFILTRATION; INTRACAUDAL; PERINEURAL
Status: DISPENSED
Start: 2019-01-08

## (undated) RX ORDER — FENTANYL CITRATE 50 UG/ML
INJECTION, SOLUTION INTRAMUSCULAR; INTRAVENOUS
Status: DISPENSED
Start: 2019-01-08

## (undated) RX ORDER — DEXAMETHASONE SODIUM PHOSPHATE 4 MG/ML
INJECTION, SOLUTION INTRA-ARTICULAR; INTRALESIONAL; INTRAMUSCULAR; INTRAVENOUS; SOFT TISSUE
Status: DISPENSED
Start: 2019-01-08

## (undated) RX ORDER — CEFAZOLIN SODIUM 2 G/100ML
INJECTION, SOLUTION INTRAVENOUS
Status: DISPENSED
Start: 2019-01-08

## (undated) RX ORDER — CEFAZOLIN SODIUM 2 G/100ML
INJECTION, SOLUTION INTRAVENOUS
Status: DISPENSED
Start: 2019-05-23

## (undated) RX ORDER — DEXAMETHASONE SODIUM PHOSPHATE 4 MG/ML
INJECTION, SOLUTION INTRA-ARTICULAR; INTRALESIONAL; INTRAMUSCULAR; INTRAVENOUS; SOFT TISSUE
Status: DISPENSED
Start: 2019-05-23

## (undated) RX ORDER — PROPOFOL 10 MG/ML
INJECTION, EMULSION INTRAVENOUS
Status: DISPENSED
Start: 2019-05-23